# Patient Record
Sex: FEMALE | Race: BLACK OR AFRICAN AMERICAN | Employment: OTHER | URBAN - METROPOLITAN AREA
[De-identification: names, ages, dates, MRNs, and addresses within clinical notes are randomized per-mention and may not be internally consistent; named-entity substitution may affect disease eponyms.]

---

## 2018-03-23 ENCOUNTER — APPOINTMENT (OUTPATIENT)
Dept: GENERAL RADIOLOGY | Age: 83
End: 2018-03-23
Attending: EMERGENCY MEDICINE
Payer: MEDICARE

## 2018-03-23 ENCOUNTER — HOSPITAL ENCOUNTER (EMERGENCY)
Age: 83
Discharge: HOME | End: 2018-03-23
Attending: EMERGENCY MEDICINE
Payer: MEDICARE

## 2018-03-23 VITALS
SYSTOLIC BLOOD PRESSURE: 162 MMHG | DIASTOLIC BLOOD PRESSURE: 60 MMHG | HEART RATE: 72 BPM | TEMPERATURE: 97.6 F | WEIGHT: 113 LBS | BODY MASS INDEX: 21.34 KG/M2 | RESPIRATION RATE: 20 BRPM | HEIGHT: 61 IN | OXYGEN SATURATION: 100 %

## 2018-03-23 DIAGNOSIS — R00.2 PALPITATIONS: ICD-10-CM

## 2018-03-23 DIAGNOSIS — E87.6 ACUTE HYPOKALEMIA: ICD-10-CM

## 2018-03-23 DIAGNOSIS — I10 ACCELERATED HYPERTENSION: ICD-10-CM

## 2018-03-23 DIAGNOSIS — R06.02 SOB (SHORTNESS OF BREATH): ICD-10-CM

## 2018-03-23 DIAGNOSIS — J20.9 ACUTE BRONCHITIS, UNSPECIFIED ORGANISM: Primary | ICD-10-CM

## 2018-03-23 LAB
ALBUMIN SERPL-MCNC: 3.6 G/DL (ref 3.5–5)
ALBUMIN/GLOB SERPL: 1 {RATIO} (ref 1.1–2.2)
ALP SERPL-CCNC: 119 U/L (ref 45–117)
ALT SERPL-CCNC: 12 U/L (ref 12–78)
ANION GAP SERPL CALC-SCNC: 8 MMOL/L (ref 5–15)
APTT PPP: 28 SEC (ref 22.1–32)
AST SERPL-CCNC: 15 U/L (ref 15–37)
BASOPHILS # BLD: 0 K/UL (ref 0–0.1)
BASOPHILS NFR BLD: 0 % (ref 0–1)
BILIRUB SERPL-MCNC: 0.5 MG/DL (ref 0.2–1)
BNP SERPL-MCNC: 225 PG/ML (ref 0–450)
BUN SERPL-MCNC: 12 MG/DL (ref 6–20)
BUN/CREAT SERPL: 10 (ref 12–20)
CALCIUM SERPL-MCNC: 8.4 MG/DL (ref 8.5–10.1)
CHLORIDE SERPL-SCNC: 111 MMOL/L (ref 97–108)
CK MB CFR SERPL CALC: 2.4 % (ref 0–2.5)
CK MB SERPL-MCNC: 2.2 NG/ML (ref 5–25)
CK SERPL-CCNC: 92 U/L (ref 26–192)
CO2 SERPL-SCNC: 25 MMOL/L (ref 21–32)
CREAT SERPL-MCNC: 1.19 MG/DL (ref 0.55–1.02)
DIFFERENTIAL METHOD BLD: ABNORMAL
EOSINOPHIL # BLD: 0.4 K/UL (ref 0–0.4)
EOSINOPHIL NFR BLD: 5 % (ref 0–7)
ERYTHROCYTE [DISTWIDTH] IN BLOOD BY AUTOMATED COUNT: 17.7 % (ref 11.5–14.5)
FLUAV AG NPH QL IA: NEGATIVE
FLUBV AG NOSE QL IA: NEGATIVE
GLOBULIN SER CALC-MCNC: 3.6 G/DL (ref 2–4)
GLUCOSE SERPL-MCNC: 99 MG/DL (ref 65–100)
HCT VFR BLD AUTO: 42 % (ref 35–47)
HGB BLD-MCNC: 13.2 G/DL (ref 11.5–16)
IMM GRANULOCYTES # BLD: 0 K/UL (ref 0–0.04)
IMM GRANULOCYTES NFR BLD AUTO: 0 % (ref 0–0.5)
INR PPP: 1.1 (ref 0.9–1.1)
LYMPHOCYTES # BLD: 1.8 K/UL (ref 0.8–3.5)
LYMPHOCYTES NFR BLD: 24 % (ref 12–49)
MAGNESIUM SERPL-MCNC: 2 MG/DL (ref 1.6–2.4)
MCH RBC QN AUTO: 22.1 PG (ref 26–34)
MCHC RBC AUTO-ENTMCNC: 31.4 G/DL (ref 30–36.5)
MCV RBC AUTO: 70.4 FL (ref 80–99)
MONOCYTES # BLD: 0.4 K/UL (ref 0–1)
MONOCYTES NFR BLD: 5 % (ref 5–13)
NEUTS SEG # BLD: 5.1 K/UL (ref 1.8–8)
NEUTS SEG NFR BLD: 66 % (ref 32–75)
NRBC # BLD: 0 K/UL (ref 0–0.01)
NRBC BLD-RTO: 0 PER 100 WBC
PLATELET # BLD AUTO: 192 K/UL (ref 150–400)
PLATELET COMMENTS,PCOM: ABNORMAL
PMV BLD AUTO: ABNORMAL FL (ref 8.9–12.9)
POTASSIUM SERPL-SCNC: 3.1 MMOL/L (ref 3.5–5.1)
PROT SERPL-MCNC: 7.2 G/DL (ref 6.4–8.2)
PROTHROMBIN TIME: 10.6 SEC (ref 9–11.1)
RBC # BLD AUTO: 5.97 M/UL (ref 3.8–5.2)
RBC MORPH BLD: ABNORMAL
SODIUM SERPL-SCNC: 144 MMOL/L (ref 136–145)
THERAPEUTIC RANGE,PTTT: NORMAL SECS (ref 58–77)
TROPONIN I SERPL-MCNC: 0.04 NG/ML
WBC # BLD AUTO: 7.7 K/UL (ref 3.6–11)

## 2018-03-23 PROCEDURE — 74011250637 HC RX REV CODE- 250/637: Performed by: EMERGENCY MEDICINE

## 2018-03-23 PROCEDURE — 74011636637 HC RX REV CODE- 636/637: Performed by: EMERGENCY MEDICINE

## 2018-03-23 PROCEDURE — 83735 ASSAY OF MAGNESIUM: CPT | Performed by: EMERGENCY MEDICINE

## 2018-03-23 PROCEDURE — 85025 COMPLETE CBC W/AUTO DIFF WBC: CPT | Performed by: EMERGENCY MEDICINE

## 2018-03-23 PROCEDURE — 84484 ASSAY OF TROPONIN QUANT: CPT | Performed by: EMERGENCY MEDICINE

## 2018-03-23 PROCEDURE — 85730 THROMBOPLASTIN TIME PARTIAL: CPT | Performed by: EMERGENCY MEDICINE

## 2018-03-23 PROCEDURE — 94640 AIRWAY INHALATION TREATMENT: CPT

## 2018-03-23 PROCEDURE — 87804 INFLUENZA ASSAY W/OPTIC: CPT | Performed by: EMERGENCY MEDICINE

## 2018-03-23 PROCEDURE — 93005 ELECTROCARDIOGRAM TRACING: CPT

## 2018-03-23 PROCEDURE — 77030029684 HC NEB SM VOL KT MONA -A

## 2018-03-23 PROCEDURE — 85610 PROTHROMBIN TIME: CPT | Performed by: EMERGENCY MEDICINE

## 2018-03-23 PROCEDURE — 71045 X-RAY EXAM CHEST 1 VIEW: CPT

## 2018-03-23 PROCEDURE — 80053 COMPREHEN METABOLIC PANEL: CPT | Performed by: EMERGENCY MEDICINE

## 2018-03-23 PROCEDURE — 74011000250 HC RX REV CODE- 250: Performed by: EMERGENCY MEDICINE

## 2018-03-23 PROCEDURE — A9270 NON-COVERED ITEM OR SERVICE: HCPCS | Performed by: EMERGENCY MEDICINE

## 2018-03-23 PROCEDURE — 82550 ASSAY OF CK (CPK): CPT | Performed by: EMERGENCY MEDICINE

## 2018-03-23 PROCEDURE — 99285 EMERGENCY DEPT VISIT HI MDM: CPT

## 2018-03-23 PROCEDURE — 83880 ASSAY OF NATRIURETIC PEPTIDE: CPT | Performed by: EMERGENCY MEDICINE

## 2018-03-23 PROCEDURE — 36415 COLL VENOUS BLD VENIPUNCTURE: CPT | Performed by: EMERGENCY MEDICINE

## 2018-03-23 RX ORDER — ALBUTEROL SULFATE 90 UG/1
2 AEROSOL, METERED RESPIRATORY (INHALATION)
Qty: 1 INHALER | Refills: 0 | Status: SHIPPED | OUTPATIENT
Start: 2018-03-23 | End: 2020-04-09 | Stop reason: SDUPTHER

## 2018-03-23 RX ORDER — IPRATROPIUM BROMIDE AND ALBUTEROL SULFATE 2.5; .5 MG/3ML; MG/3ML
3 SOLUTION RESPIRATORY (INHALATION)
Status: COMPLETED | OUTPATIENT
Start: 2018-03-23 | End: 2018-03-23

## 2018-03-23 RX ORDER — GUAIFENESIN, PSEUDOEPHEDRINE HYDROCHLORIDE 600; 60 MG/1; MG/1
1 TABLET, EXTENDED RELEASE ORAL EVERY 12 HOURS
Qty: 20 TAB | Refills: 0 | Status: SHIPPED | OUTPATIENT
Start: 2018-03-23 | End: 2020-04-03

## 2018-03-23 RX ORDER — METHYLPREDNISOLONE 4 MG/1
TABLET ORAL
Qty: 1 DOSE PACK | Refills: 0 | Status: SHIPPED | OUTPATIENT
Start: 2018-03-23 | End: 2020-04-03

## 2018-03-23 RX ORDER — PREDNISONE 20 MG/1
60 TABLET ORAL ONCE
Status: COMPLETED | OUTPATIENT
Start: 2018-03-23 | End: 2018-03-23

## 2018-03-23 RX ORDER — POTASSIUM CHLORIDE 20 MEQ/1
40 TABLET, EXTENDED RELEASE ORAL
Status: COMPLETED | OUTPATIENT
Start: 2018-03-23 | End: 2018-03-23

## 2018-03-23 RX ADMIN — POTASSIUM CHLORIDE 40 MEQ: 20 TABLET, EXTENDED RELEASE ORAL at 14:13

## 2018-03-23 RX ADMIN — PREDNISONE 60 MG: 20 TABLET ORAL at 14:34

## 2018-03-23 RX ADMIN — IPRATROPIUM BROMIDE AND ALBUTEROL SULFATE 3 ML: .5; 3 SOLUTION RESPIRATORY (INHALATION) at 13:50

## 2018-03-23 NOTE — ED PROVIDER NOTES
EMERGENCY DEPARTMENT HISTORY AND PHYSICAL EXAM      Date: 3/23/2018  Patient Name: Alber Sanchez    History of Presenting Illness     Chief Complaint   Patient presents with    Shortness of Breath    Palpitations       History Provided By: Patient and EMS    HPI: Alber Snachez, 80 y.o. female with PMHx significant for HTN, MI, CAD, presents via EMS to the ED with cc of severe, sudden onset SOB that is exacerbated by ambulating x today 1hr PTA in the ED. Per EMS, pt had BL expiratory wheezes on scene with her O2 stat at 90; she was administered 2 duo neb treatments, and Deltasone in route to the ED. the pt reports her breathing has greatly improved since being picked up by EMS. per chart review, pt was diagnosed with a small stroke on her last admission. Of note, she follows Dr. Nelson Cortez, Cardiology and has taken her HTN medication this morning. She denies any other cc. Social Hx:  ETOH: +  Tobacco: +  Illicit drug use: n/a    PCP: Jl Monique MD   Cardiology: Dr. Nelson Cortez MD    There are no other complaints, changes, or physical findings at this time. Current Outpatient Prescriptions   Medication Sig Dispense Refill    methylPREDNISolone (MEDROL, ELIEL,) 4 mg tablet Take as instructed 1 Dose Pack 0    albuterol (PROVENTIL HFA, VENTOLIN HFA, PROAIR HFA) 90 mcg/actuation inhaler Take 2 Puffs by inhalation every four (4) hours as needed for Wheezing. 1 Inhaler 0    PSEUDOEPHEDRINE-guaiFENesin (MUCINEX D)  mg per tablet Take 1 Tab by mouth every twelve (12) hours. 20 Tab 0    HYDROcodone-acetaminophen (NORCO) 5-325 mg per tablet Take 1 Tab by mouth every four (4) hours as needed for Pain. Max Daily Amount: 6 Tabs. 20 Tab 0    predniSONE (DELTASONE) 20 mg tablet Take 1 tab po daily for 3 days, then 1/2 tab po daily for 3 days 5 tablet 0    traMADol (ULTRAM) 50 mg tablet Take 50 mg by mouth as needed.  aspirin 81 mg tablet Take 81 mg by mouth daily.            Past History Past Medical History:  Past Medical History:   Diagnosis Date    Hypertension     Other ill-defined conditions(629.89)     2 MI's last one in 2007       Past Surgical History:  History reviewed. No pertinent surgical history. Family History:  History reviewed. No pertinent family history. Social History:  Social History   Substance Use Topics    Smoking status: Current Every Day Smoker    Smokeless tobacco: Never Used    Alcohol use Yes       Allergies:  No Known Allergies      Review of Systems   Review of Systems   Constitutional: Negative. Negative for chills and fever. HENT: Negative. Negative for congestion, facial swelling, rhinorrhea, sore throat, trouble swallowing and voice change. Eyes: Negative. Respiratory: Positive for shortness of breath. Negative for apnea, cough, chest tightness and wheezing. Cardiovascular: Negative. Negative for chest pain, palpitations and leg swelling. Gastrointestinal: Negative. Negative for abdominal distention, abdominal pain, blood in stool, constipation, diarrhea, nausea and vomiting. Endocrine: Negative. Negative for cold intolerance, heat intolerance and polyuria. Genitourinary: Negative. Negative for difficulty urinating, dysuria, flank pain, frequency, hematuria and urgency. Musculoskeletal: Negative. Negative for arthralgias, back pain, myalgias, neck pain and neck stiffness. Skin: Negative. Negative for color change and rash. Neurological: Negative. Negative for dizziness, syncope, facial asymmetry, speech difficulty, weakness, light-headedness, numbness and headaches. Hematological: Negative. Does not bruise/bleed easily. Psychiatric/Behavioral: Negative. Negative for confusion and self-injury. The patient is not nervous/anxious. Physical Exam   Physical Exam   Constitutional: She is oriented to person, place, and time. She appears well-developed and well-nourished. No distress.    HENT:   Head: Normocephalic and atraumatic. Mouth/Throat: Oropharynx is clear and moist. No oropharyngeal exudate. Eyes: Conjunctivae and EOM are normal. Pupils are equal, round, and reactive to light. Neck: Normal range of motion. Cardiovascular: Normal rate, regular rhythm and normal heart sounds. Exam reveals no gallop and no friction rub. No murmur heard. Pulmonary/Chest: Effort normal. Tachypnea (in the 20's) noted. No respiratory distress. She has wheezes (BL expiratory). She has no rales. She exhibits no tenderness. Poor air exchange. Abdominal: Soft. Bowel sounds are normal. She exhibits no distension and no mass. There is no tenderness. There is no rebound and no guarding. Musculoskeletal: Normal range of motion. She exhibits no edema, tenderness or deformity. No peripheral edema. Neurological: She is alert and oriented to person, place, and time. She displays normal reflexes. No cranial nerve deficit. She exhibits normal muscle tone. Coordination normal.   Skin: Skin is warm. No rash noted. She is not diaphoretic. Psychiatric: She has a normal mood and affect. Nursing note and vitals reviewed.         Diagnostic Study Results     Labs -     Recent Results (from the past 12 hour(s))   EKG, 12 LEAD, INITIAL    Collection Time: 03/23/18  1:15 PM   Result Value Ref Range    Ventricular Rate 82 BPM    Atrial Rate 82 BPM    P-R Interval 144 ms    QRS Duration 90 ms    Q-T Interval 388 ms    QTC Calculation (Bezet) 453 ms    Calculated P Axis 16 degrees    Calculated R Axis 38 degrees    Calculated T Axis 64 degrees    Diagnosis       Normal sinus rhythm  Minimal voltage criteria for LVH, may be normal variant  Nonspecific T wave abnormality  Abnormal ECG  When compared with ECG of 27-MAR-2015 20:42,  Nonspecific T wave abnormality has replaced inverted T waves in Lateral leads     CBC WITH AUTOMATED DIFF    Collection Time: 03/23/18  1:20 PM   Result Value Ref Range    WBC 7.7 3.6 - 11.0 K/uL    RBC 5.97 (H) 3.80 - 5.20 M/uL    HGB 13.2 11.5 - 16.0 g/dL    HCT 42.0 35.0 - 47.0 %    MCV 70.4 (L) 80.0 - 99.0 FL    MCH 22.1 (L) 26.0 - 34.0 PG    MCHC 31.4 30.0 - 36.5 g/dL    RDW 17.7 (H) 11.5 - 14.5 %    PLATELET 286 393 - 892 K/uL    MPV ABNORMAL 8.9 - 12.9 FL    NRBC 0.0 0  WBC    ABSOLUTE NRBC 0.00 0.00 - 0.01 K/uL    NEUTROPHILS 66 32 - 75 %    LYMPHOCYTES 24 12 - 49 %    MONOCYTES 5 5 - 13 %    EOSINOPHILS 5 0 - 7 %    BASOPHILS 0 0 - 1 %    IMMATURE GRANULOCYTES 0 0.0 - 0.5 %    ABS. NEUTROPHILS 5.1 1.8 - 8.0 K/UL    ABS. LYMPHOCYTES 1.8 0.8 - 3.5 K/UL    ABS. MONOCYTES 0.4 0.0 - 1.0 K/UL    ABS. EOSINOPHILS 0.4 0.0 - 0.4 K/UL    ABS. BASOPHILS 0.0 0.0 - 0.1 K/UL    ABS. IMM. GRANS. 0.0 0.00 - 0.04 K/UL    DF SMEAR SCANNED      PLATELET COMMENTS Large Platelets      RBC COMMENTS HYPOCHROMIA  1+        RBC COMMENTS SCHISTOCYTES  PRESENT        RBC COMMENTS TARGET CELLS  PRESENT       CK W/ CKMB & INDEX    Collection Time: 03/23/18  1:20 PM   Result Value Ref Range    CK 92 26 - 192 U/L    CK - MB 2.2 <3.6 NG/ML    CK-MB Index 2.4 0 - 2.5     METABOLIC PANEL, COMPREHENSIVE    Collection Time: 03/23/18  1:20 PM   Result Value Ref Range    Sodium 144 136 - 145 mmol/L    Potassium 3.1 (L) 3.5 - 5.1 mmol/L    Chloride 111 (H) 97 - 108 mmol/L    CO2 25 21 - 32 mmol/L    Anion gap 8 5 - 15 mmol/L    Glucose 99 65 - 100 mg/dL    BUN 12 6 - 20 MG/DL    Creatinine 1.19 (H) 0.55 - 1.02 MG/DL    BUN/Creatinine ratio 10 (L) 12 - 20      GFR est AA 51 (L) >60 ml/min/1.73m2    GFR est non-AA 42 (L) >60 ml/min/1.73m2    Calcium 8.4 (L) 8.5 - 10.1 MG/DL    Bilirubin, total 0.5 0.2 - 1.0 MG/DL    ALT (SGPT) 12 12 - 78 U/L    AST (SGOT) 15 15 - 37 U/L    Alk.  phosphatase 119 (H) 45 - 117 U/L    Protein, total 7.2 6.4 - 8.2 g/dL    Albumin 3.6 3.5 - 5.0 g/dL    Globulin 3.6 2.0 - 4.0 g/dL    A-G Ratio 1.0 (L) 1.1 - 2.2     MAGNESIUM    Collection Time: 03/23/18  1:20 PM   Result Value Ref Range    Magnesium 2.0 1.6 - 2.4 mg/dL   NT-PRO BNP    Collection Time: 03/23/18  1:20 PM   Result Value Ref Range    NT pro- 0 - 450 PG/ML   PROTHROMBIN TIME + INR    Collection Time: 03/23/18  1:20 PM   Result Value Ref Range    INR 1.1 0.9 - 1.1      Prothrombin time 10.6 9.0 - 11.1 sec   PTT    Collection Time: 03/23/18  1:20 PM   Result Value Ref Range    aPTT 28.0 22.1 - 32.0 sec    aPTT, therapeutic range     58.0 - 77.0 SECS   TROPONIN I    Collection Time: 03/23/18  1:20 PM   Result Value Ref Range    Troponin-I, Qt. 0.04 <0.05 ng/mL   INFLUENZA A & B AG (RAPID TEST)    Collection Time: 03/23/18  1:20 PM   Result Value Ref Range    Influenza A Antigen NEGATIVE  NEG      Influenza B Antigen NEGATIVE  NEG         Radiologic Studies -   CXR Results  (Last 48 hours)               03/23/18 1337  XR CHEST PORT Final result    Impression:  IMPRESSION: No acute abnormality. Narrative:  EXAM:  XR CHEST PORT. INDICATION: Short of breath. COMPARISON: 3/27/2015. FINDINGS:    A portable AP radiograph of the chest was obtained at 1335 hours. The   positioning is lordotic. Lines and tubes: The patient is on a cardiac monitor. Lungs: The lungs are clear. Pleura: There is no pneumothorax or pleural effusion. Mediastinum: The cardiac and mediastinal contours and pulmonary vascularity are   normal. The aorta is atherosclerotic. Bones and soft tissues: The bones and soft tissues are grossly within normal   limits. Medical Decision Making   I am the first provider for this patient. I reviewed the vital signs, available nursing notes, past medical history, past surgical history, family history and social history. Vital Signs-Reviewed the patient's vital signs.   Patient Vitals for the past 12 hrs:   Temp Pulse Resp BP SpO2   03/23/18 1515 - 72 20 162/60 -   03/23/18 1500 - 73 23 157/61 100 %   03/23/18 1445 - 72 24 168/56 100 %   03/23/18 1434 - 83 22 166/57 -   03/23/18 1415 - 95 27 100/83 95 %   03/23/18 1400 - 74 19 137/50 99 %   03/23/18 1345 - 75 22 160/57 99 %   03/23/18 1330 - 81 21 148/63 100 %   03/23/18 1315 - 83 22 170/61 99 %   03/23/18 1256 97.6 °F (36.4 °C) 92 30 (!) 173/122 100 %     EKG interpretation: (Preliminary) 13:15  Rhythm: normal sinus rhythm; and regular . Rate (approx.): 82 bpm; Axis: normal; MD interval: normal; QRS interval: normal ; ST/T wave: non-specific T wave abnormality; Other findings: abnormal ekg. Records Reviewed: Nursing Notes, Old Medical Records, Previous electrocardiograms, Ambulance Run Sheet, Previous Radiology Studies and Previous Laboratory Studies    Provider Notes (Medical Decision Making):     DDx: CHF exacerbation, wheezing, pneumonia, ACS, pleural effusion. MDM: Pt reports improvement of symptoms after decadron and duo neb treatment in route. Per chart review, pt does have prior to MIs, and Hx of acute bronchitis. She is followed by Dr. Jeferson Gabriel. Will obtain EKG, basic labs, cardiac enzymes, chest x ray. Will provide addtional neb treatment. Pt does not appear to be fluid overloaded on exam.     ED Course:   Initial assessment performed. The patients presenting problems have been discussed, and they are in agreement with the care plan formulated and outlined with them. I have encouraged them to ask questions as they arise throughout their visit. 1:53 PM  I re-examined the patient and evaluated the effectiveness of breathing treatment they received. I feel that there has been some improvement, but also feel that the patient would benefit clinically from further breathing treatments. I will evaluate the patient again after the next round of treatments. Progress Note:  Pt states she feels much better; pain resolved; denies any nausea; no new symptoms; pt able to tolerate PO and ambulate without issues; pt clinically safe for discharge home with close PCP f/u.   At time of discharge, pt had stable vitals and had no questions or concerns, and was very satisfied with overall care. TOBACCO COUNSELING:  Upon evaluation, pt expressed that they are a current tobacco user. For approximately 10 minutes, pt has been counseled on the dangers of smoking and was encouraged to quit as soon as possible in order to decrease further risks to their health. Pt has conveyed their understanding of the risks involved should they continue to use tobacco products. Disposition:  DISCHARGE NOTE  3:36 PM  The patient has been re-evaluated and is ready for discharge. Reviewed available results with patient. Counseled pt on diagnosis and care plan. Pt has expressed understanding, and all questions have been answered. Pt agrees with plan and agrees to F/U as recommended, or return to the ED if their sxs worsen. Discharge instructions have been provided and explained to the pt, along with reasons to return to the ED. PLAN:  1. Discharge Medication List as of 3/23/2018  3:04 PM      CONTINUE these medications which have NOT CHANGED    Details   HYDROcodone-acetaminophen (NORCO) 5-325 mg per tablet Take 1 Tab by mouth every four (4) hours as needed for Pain. Max Daily Amount: 6 Tabs., Print, Disp-20 Tab, R-0      predniSONE (DELTASONE) 20 mg tablet Take 1 tab po daily for 3 days, then 1/2 tab po daily for 3 days, Print, Disp-5 tablet, R-0      traMADol (ULTRAM) 50 mg tablet Take 50 mg by mouth as needed., Historical Med      aspirin 81 mg tablet Take 81 mg by mouth daily. , Historical Med           2. Follow-up Information     Follow up With Details Comments [address], [place]   [address]  [phone]      [facility] EMERGENCY DEPT  As needed, If symptoms worsen [address] [place] [address]  [phone]        Return to ED if worse     Diagnosis     Clinical Impression:   1. Acute bronchitis, unspecified organism    2. SOB (shortness of breath)    3. Palpitations    4. Accelerated hypertension    5.  Acute hypokalemia        Attestations: This note is prepared by Francisco Landeros, acting as Scribe for Tomas Loving MD.      The scribe's documentation has been prepared under my direction and personally reviewed by me in its entirety. I confirm that the note above accurately reflects all work, treatment, procedures, and medical decision making performed by me. Tomas Loving MD        This note will not be viewable in [address]

## 2018-03-23 NOTE — DISCHARGE INSTRUCTIONS
Thank you! Thank you for allowing us to provide you with excellent care today. We hope we addressed all of your concerns and needs. We strive to provide excellent quality care in the Emergency Department. You will receive a survey after your visit to evaluate the care you were provided. Please rate us a level 5 (excellent), as anything less than excellent does not meet our goals. If you feel that you have not received excellent quality care or timely care, please ask to speak to the nurse manager. Please choose us in the future for your continued health care needs. ------------------------------------------------------------------------------------------------------------  The exam and treatment you received in the Emergency Department were for an urgent problem and are not intended as complete care. It is important that you follow up with a doctor, nurse practitioner, or physician assistant for ongoing care. If your symptoms become worse or you do not improve as expected and you are unable to reach your usual health care provider, you should return to the Emergency Department. We are available 24 hours a day. Please take your discharge instructions with you when you go to your follow-up appointment. If you have any problem arranging a follow-up appointment, contact the Emergency Department immediately. If a prescription has been provided, please have it filled as soon as possible to prevent a delay in treatment. Read the entire medication instruction sheet provided to you by the pharmacy. If you have any questions or reservations about taking the medication due to side effects or interactions with other medications, please call your primary care physician or contact the ER to speak with the charge nurse. Make an appointment with your family doctor or the physician you were referred to for follow-up of this visit as instructed on your discharge paperwork, as this is mandatory follow-up. Return to the ER if you are unable to be seen or if you are unable to be seen in a timely manner. If you have any problem arranging the follow-up visit, contact the Emergency Department immediately. Bronchitis: Care Instructions  Your Care Instructions    Bronchitis is inflammation of the bronchial tubes, which carry air to the lungs. The tubes swell and produce mucus, or phlegm. The mucus and inflamed bronchial tubes make you cough. You may have trouble breathing. Most cases of bronchitis are caused by viruses like those that cause colds. Antibiotics usually do not help and they may be harmful. Bronchitis usually develops rapidly and lasts about 2 to 3 weeks in otherwise healthy people. Follow-up care is a key part of your treatment and safety. Be sure to make and go to all appointments, and call your doctor if you are having problems. It's also a good idea to know your test results and keep a list of the medicines you take. How can you care for yourself at home? · Take all medicines exactly as prescribed. Call your doctor if you think you are having a problem with your medicine. · Get some extra rest.  · Take an over-the-counter pain medicine, such as acetaminophen (Tylenol), ibuprofen (Advil, Motrin), or naproxen (Aleve) to reduce fever and relieve body aches. Read and follow all instructions on the label. · Do not take two or more pain medicines at the same time unless the doctor told you to. Many pain medicines have acetaminophen, which is Tylenol. Too much acetaminophen (Tylenol) can be harmful. · Take an over-the-counter cough medicine that contains dextromethorphan to help quiet a dry, hacking cough so that you can sleep. Avoid cough medicines that have more than one active ingredient. Read and follow all instructions on the label. · Breathe moist air from a humidifier, hot shower, or sink filled with hot water. The heat and moisture will thin mucus so you can cough it out.   · Do not smoke. Smoking can make bronchitis worse. If you need help quitting, talk to your doctor about stop-smoking programs and medicines. These can increase your chances of quitting for good. When should you call for help? Call 911 anytime you think you may need emergency care. For example, call if:  ? · You have severe trouble breathing. ?Call your doctor now or seek immediate medical care if:  ? · You have new or worse trouble breathing. ? · You cough up dark brown or bloody mucus (sputum). ? · You have a new or higher fever. ? · You have a new rash. ? Watch closely for changes in your health, and be sure to contact your doctor if:  ? · You cough more deeply or more often, especially if you notice more mucus or a change in the color of your mucus. ? · You are not getting better as expected. Where can you learn more? Go to http://maria antonia-ellis.info/. Enter H333 in the search box to learn more about \"Bronchitis: Care Instructions. \"  Current as of: May 12, 2017  Content Version: 11.4  © 9365-5737 [employer]. Care instructions adapted under license by [employer] (which disclaims liability or warranty for this information). If you have questions about a medical condition or this instruction, always ask your healthcare professional. [address] any warranty or liability for your use of this information.

## 2018-03-23 NOTE — ED NOTES
Pt arrived via EMS and bedside report obtained. Assumed care of pt at this time. Patient arrived with c/o of SOB. Per ems, patient's O2 was at 88% on room air upon arrival. Patient has expiratory wheezing and labored breathing. Patient is also tachypneic and tachycardic at this time. Patient is currently at 98% on room air after receiving a duo-neb treatment provided by ems. Pt resting comfortably in bed with side rails up and call bell with within reach. Pt aware of plan to await for MD/PA-C assessment, and pt/family verbalizes understanding. Placed on Monitor x 3 with alarms on.

## 2018-03-23 NOTE — ED NOTES
Discharge instructions given to patient by Domenico Gonzalez MD. Patient verbalized understanding of discharge instructions. Pt discharged without difficulty. Pt discharged in stable condition via wheelchair, accompanied by family friend.

## 2018-03-24 LAB
ATRIAL RATE: 82 BPM
CALCULATED P AXIS, ECG09: 16 DEGREES
CALCULATED R AXIS, ECG10: 38 DEGREES
CALCULATED T AXIS, ECG11: 64 DEGREES
DIAGNOSIS, 93000: NORMAL
P-R INTERVAL, ECG05: 144 MS
Q-T INTERVAL, ECG07: 388 MS
QRS DURATION, ECG06: 90 MS
QTC CALCULATION (BEZET), ECG08: 453 MS
VENTRICULAR RATE, ECG03: 82 BPM

## 2018-08-26 ENCOUNTER — HOSPITAL ENCOUNTER (EMERGENCY)
Age: 83
Discharge: HOME | End: 2018-08-26
Attending: EMERGENCY MEDICINE
Payer: MEDICARE

## 2018-08-26 ENCOUNTER — APPOINTMENT (OUTPATIENT)
Dept: CT IMAGING | Age: 83
End: 2018-08-26
Attending: EMERGENCY MEDICINE
Payer: MEDICARE

## 2018-08-26 VITALS
HEIGHT: 60 IN | DIASTOLIC BLOOD PRESSURE: 46 MMHG | BODY MASS INDEX: 22.19 KG/M2 | HEART RATE: 57 BPM | WEIGHT: 113 LBS | RESPIRATION RATE: 18 BRPM | SYSTOLIC BLOOD PRESSURE: 142 MMHG | OXYGEN SATURATION: 96 %

## 2018-08-26 DIAGNOSIS — R55 NEAR SYNCOPE: ICD-10-CM

## 2018-08-26 DIAGNOSIS — E86.0 DEHYDRATION: ICD-10-CM

## 2018-08-26 DIAGNOSIS — N17.9 AKI (ACUTE KIDNEY INJURY) (HCC): Primary | ICD-10-CM

## 2018-08-26 LAB
ALBUMIN SERPL-MCNC: 3.3 G/DL (ref 3.5–5)
ALBUMIN/GLOB SERPL: 0.9 {RATIO} (ref 1.1–2.2)
ALP SERPL-CCNC: 91 U/L (ref 45–117)
ALT SERPL-CCNC: 14 U/L (ref 12–78)
ANION GAP SERPL CALC-SCNC: 7 MMOL/L (ref 5–15)
APPEARANCE UR: CLEAR
AST SERPL-CCNC: 12 U/L (ref 15–37)
BACTERIA URNS QL MICRO: ABNORMAL /HPF
BASOPHILS # BLD: 0.1 K/UL (ref 0–0.1)
BASOPHILS NFR BLD: 2 % (ref 0–1)
BILIRUB SERPL-MCNC: 0.5 MG/DL (ref 0.2–1)
BILIRUB UR QL: NEGATIVE
BUN SERPL-MCNC: 33 MG/DL (ref 6–20)
BUN/CREAT SERPL: 17 (ref 12–20)
CALCIUM SERPL-MCNC: 8.5 MG/DL (ref 8.5–10.1)
CHLORIDE SERPL-SCNC: 110 MMOL/L (ref 97–108)
CK SERPL-CCNC: 63 U/L (ref 26–192)
CO2 SERPL-SCNC: 26 MMOL/L (ref 21–32)
COLOR UR: ABNORMAL
CREAT SERPL-MCNC: 1.96 MG/DL (ref 0.55–1.02)
DIFFERENTIAL METHOD BLD: ABNORMAL
EOSINOPHIL # BLD: 0.4 K/UL (ref 0–0.4)
EOSINOPHIL NFR BLD: 10 % (ref 0–7)
EPITH CASTS URNS QL MICRO: ABNORMAL /LPF
ERYTHROCYTE [DISTWIDTH] IN BLOOD BY AUTOMATED COUNT: 17.8 % (ref 11.5–14.5)
GLOBULIN SER CALC-MCNC: 3.6 G/DL (ref 2–4)
GLUCOSE SERPL-MCNC: 110 MG/DL (ref 65–100)
GLUCOSE UR STRIP.AUTO-MCNC: NEGATIVE MG/DL
HCT VFR BLD AUTO: 39.7 % (ref 35–47)
HGB BLD-MCNC: 12.3 G/DL (ref 11.5–16)
HGB UR QL STRIP: ABNORMAL
IMM GRANULOCYTES # BLD: 0 K/UL (ref 0–0.04)
IMM GRANULOCYTES NFR BLD AUTO: 0 % (ref 0–0.5)
KETONES UR QL STRIP.AUTO: NEGATIVE MG/DL
LEUKOCYTE ESTERASE UR QL STRIP.AUTO: NEGATIVE
LYMPHOCYTES # BLD: 1.6 K/UL (ref 0.8–3.5)
LYMPHOCYTES NFR BLD: 38 % (ref 12–49)
MCH RBC QN AUTO: 22.5 PG (ref 26–34)
MCHC RBC AUTO-ENTMCNC: 31 G/DL (ref 30–36.5)
MCV RBC AUTO: 72.6 FL (ref 80–99)
MONOCYTES # BLD: 0.5 K/UL (ref 0–1)
MONOCYTES NFR BLD: 11 % (ref 5–13)
NEUTS SEG # BLD: 1.6 K/UL (ref 1.8–8)
NEUTS SEG NFR BLD: 39 % (ref 32–75)
NITRITE UR QL STRIP.AUTO: NEGATIVE
NRBC # BLD: 0 K/UL (ref 0–0.01)
NRBC BLD-RTO: 0 PER 100 WBC
PH UR STRIP: 6 [PH] (ref 5–8)
PLATELET # BLD AUTO: 167 K/UL (ref 150–400)
POTASSIUM SERPL-SCNC: 3.7 MMOL/L (ref 3.5–5.1)
PROT SERPL-MCNC: 6.9 G/DL (ref 6.4–8.2)
PROT UR STRIP-MCNC: ABNORMAL MG/DL
RBC # BLD AUTO: 5.47 M/UL (ref 3.8–5.2)
RBC #/AREA URNS HPF: ABNORMAL /HPF (ref 0–5)
SODIUM SERPL-SCNC: 143 MMOL/L (ref 136–145)
SP GR UR REFRACTOMETRY: 1.02 (ref 1–1.03)
T4 FREE SERPL-MCNC: 1 NG/DL (ref 0.8–1.5)
TROPONIN I SERPL-MCNC: <0.05 NG/ML
TSH SERPL DL<=0.05 MIU/L-ACNC: 10.8 UIU/ML (ref 0.36–3.74)
UR CULT HOLD, URHOLD: NORMAL
UROBILINOGEN UR QL STRIP.AUTO: 0.2 EU/DL (ref 0.2–1)
WBC # BLD AUTO: 4.1 K/UL (ref 3.6–11)
WBC URNS QL MICRO: ABNORMAL /HPF (ref 0–4)

## 2018-08-26 PROCEDURE — 84439 ASSAY OF FREE THYROXINE: CPT | Performed by: EMERGENCY MEDICINE

## 2018-08-26 PROCEDURE — 93005 ELECTROCARDIOGRAM TRACING: CPT

## 2018-08-26 PROCEDURE — 82550 ASSAY OF CK (CPK): CPT | Performed by: EMERGENCY MEDICINE

## 2018-08-26 PROCEDURE — 99285 EMERGENCY DEPT VISIT HI MDM: CPT

## 2018-08-26 PROCEDURE — 74011250636 HC RX REV CODE- 250/636: Performed by: EMERGENCY MEDICINE

## 2018-08-26 PROCEDURE — 80053 COMPREHEN METABOLIC PANEL: CPT | Performed by: EMERGENCY MEDICINE

## 2018-08-26 PROCEDURE — 85025 COMPLETE CBC W/AUTO DIFF WBC: CPT | Performed by: EMERGENCY MEDICINE

## 2018-08-26 PROCEDURE — 84484 ASSAY OF TROPONIN QUANT: CPT | Performed by: EMERGENCY MEDICINE

## 2018-08-26 PROCEDURE — 36415 COLL VENOUS BLD VENIPUNCTURE: CPT | Performed by: EMERGENCY MEDICINE

## 2018-08-26 PROCEDURE — 84443 ASSAY THYROID STIM HORMONE: CPT | Performed by: EMERGENCY MEDICINE

## 2018-08-26 PROCEDURE — 70450 CT HEAD/BRAIN W/O DYE: CPT

## 2018-08-26 PROCEDURE — 51701 INSERT BLADDER CATHETER: CPT

## 2018-08-26 PROCEDURE — 81001 URINALYSIS AUTO W/SCOPE: CPT | Performed by: EMERGENCY MEDICINE

## 2018-08-26 PROCEDURE — 96360 HYDRATION IV INFUSION INIT: CPT

## 2018-08-26 PROCEDURE — 77030011943

## 2018-08-26 RX ADMIN — SODIUM CHLORIDE 1000 ML: 900 INJECTION, SOLUTION INTRAVENOUS at 17:53

## 2018-08-26 NOTE — ED TRIAGE NOTES
[place] was [place] and had a syncopal episode with no fall. BP was 110/54 which the family states was low for her.  EMS gvave her 500 cc bolus

## 2018-08-26 NOTE — DISCHARGE INSTRUCTIONS
Dehydration: Care Instructions  Your Care Instructions  Dehydration happens when your body loses too much fluid. This might happen when you do not drink enough water or you lose large amounts of fluids from your body because of diarrhea, vomiting, or sweating. Severe dehydration can be life-threatening. Water and minerals called electrolytes help put your body fluids back in balance. Learn the early signs of fluid loss, and drink more fluids to prevent dehydration. Follow-up care is a key part of your treatment and safety. Be sure to make and go to all appointments, and call your doctor if you are having problems. It's also a good idea to know your test results and keep a list of the medicines you take. How can you care for yourself at home? · To prevent dehydration, drink plenty of fluids, enough so that your urine is light yellow or clear like water. Choose water and other caffeine-free clear liquids until you feel better. If you have kidney, heart, or liver disease and have to limit fluids, talk with your doctor before you increase the amount of fluids you drink. · If you do not feel like eating or drinking, try taking small sips of water, sports drinks, or other rehydration drinks. · Get plenty of rest.  To prevent dehydration  · Add more fluids to your diet and daily routine, unless your doctor has told you not to. · During hot weather, drink more fluids. Drink even more fluids if you exercise a lot. Stay away from drinks with alcohol or caffeine. · Watch for the symptoms of dehydration. These include:  ¨ A dry, sticky mouth. ¨ Dark yellow urine, and not much of it. ¨ Dry and sunken eyes. ¨ Feeling very tired. · Learn what problems can lead to dehydration. These include:  ¨ Diarrhea, fever, and vomiting. ¨ Any illness with a fever, such as pneumonia or the flu. ¨ Activities that cause heavy sweating, such as endurance races and heavy outdoor work in hot or humid weather.   ¨ Alcohol or drug abuse or withdrawal.  ¨ Certain medicines, such as cold and allergy pills (antihistamines), diet pills (diuretics), and laxatives. ¨ Certain diseases, such as diabetes, cancer, and heart or kidney disease. When should you call for help? Call 911 anytime you think you may need emergency care. For example, call if:    · You passed out (lost consciousness).    Call your doctor now or seek immediate medical care if:    · You are confused and cannot think clearly.     · You are dizzy or lightheaded, or you feel like you may faint.     · You have signs of needing more fluids. You have sunken eyes and a dry mouth, and you pass only a little dark urine.     · You cannot keep fluids down.    Watch closely for changes in your health, and be sure to contact your doctor if:    · You are not making tears.     · Your skin is very dry and sags slowly back into place after you pinch it.     · Your mouth and eyes are very dry. Where can you learn more? Go to http://maria antonia-ellis.info/. Enter D080 in the search box to learn more about \"Dehydration: Care Instructions. \"  Current as of: November 20, 2017  Content Version: 11.7  © 4112-4684 [employer]. Care instructions adapted under license by [employer] (which disclaims liability or warranty for this information). If you have questions about a medical condition or this instruction, always ask your healthcare professional. [place] [address] any warranty or liability for your use of this information. Lightheadedness or Faintness: Care Instructions  Your Care Instructions  Lightheadedness is a feeling that you are about to faint or \"pass out. \" You do not feel as if you or your surroundings are moving. It is different from vertigo, which is the feeling that you or things around you are spinning or tilting. Lightheadedness usually goes away or gets better when you lie down.  If lightheadedness gets worse, it can lead to a fainting spell. It is common to feel lightheaded from time to time. Lightheadedness usually is not caused by a serious problem. It often is caused by a short-lasting drop in blood pressure and blood flow to your head that occurs when you get up too quickly from a seated or lying position. Follow-up care is a key part of your treatment and safety. Be sure to make and go to all appointments, and call your doctor if you are having problems. It's also a good idea to know your test results and keep a list of the medicines you take. How can you care for yourself at home? · Lie down for 1 or 2 minutes when you feel lightheaded. After lying down, sit up slowly and remain sitting for 1 to 2 minutes before slowly standing up. · Avoid movements, positions, or activities that have made you lightheaded in the past.  · Get plenty of rest, especially if you have a cold or flu, which can cause lightheadedness. · Make sure you drink plenty of fluids, especially if you have a fever or have been sweating. · Do not drive or put yourself and others in danger while you feel lightheaded. When should you call for help? Call 911 anytime you think you may need emergency care. For example, call if:    · You have symptoms of a stroke. These may include:  ¨ Sudden numbness, tingling, weakness, or loss of movement in your face, arm, or leg, especially on only one side of your body. ¨ Sudden vision changes. ¨ Sudden trouble speaking. ¨ Sudden confusion or trouble understanding simple statements. ¨ Sudden problems with walking or balance. ¨ A sudden, severe headache that is different from past headaches.     · You have symptoms of a heart attack. These may include:  ¨ Chest pain or pressure, or a strange feeling in the chest.  ¨ Sweating. ¨ Shortness of breath. ¨ Nausea or vomiting. ¨ Pain, pressure, or a strange feeling in the back, neck, jaw, or upper belly or in one or both shoulders or arms.   ¨ Lightheadedness or sudden weakness. ¨ A fast or irregular heartbeat. After you call 911, the  may tell you to chew 1 adult-strength or 2 to 4 low-dose aspirin. Wait for an ambulance. Do not try to drive yourself.    Watch closely for changes in your health, and be sure to contact your doctor if:    · Your lightheadedness gets worse or does not get better with home care. Where can you learn more? Go to http://maria antonia-ellis.info/. Enter E969 in the search box to learn more about \"Lightheadedness or Faintness: Care Instructions. \"  Current as of: November 20, 2017  Content Version: 11.7  © 7114-7780 [employer]. Care instructions adapted under license by [employer] (which disclaims liability or warranty for this information). If you have questions about a medical condition or this instruction, always ask your healthcare professional. [address] any warranty or liability for your use of this information.

## 2018-08-27 LAB
ATRIAL RATE: 55 BPM
CALCULATED P AXIS, ECG09: -18 DEGREES
CALCULATED R AXIS, ECG10: 47 DEGREES
CALCULATED T AXIS, ECG11: 65 DEGREES
DIAGNOSIS, 93000: NORMAL
P-R INTERVAL, ECG05: 164 MS
Q-T INTERVAL, ECG07: 476 MS
QRS DURATION, ECG06: 92 MS
QTC CALCULATION (BEZET), ECG08: 455 MS
VENTRICULAR RATE, ECG03: 55 BPM

## 2018-08-27 NOTE — ED PROVIDER NOTES
Patient is a 80 y.o. female presenting with syncope. The history is provided by the patient and a relative. Syncope    This is a new problem. The current episode started less than 1 hour ago (near-syncope where she became weak and fully recovered). The problem occurs constantly. The problem has not changed since onset. She lost consciousness for a period of less than one minute. Associated with: being outside. Associated symptoms include confusion (chronic) and light-headedness. Pertinent negatives include no chest pain, no fever, no malaise/fatigue, no congestion, no headaches, no focal weakness, no seizures, no slurred speech and no head injury. Treatments tried: IV fluids. The treatment provided significant relief. Past Medical History:   Diagnosis Date    Hypertension     Other ill-defined conditions(413.22)     2 MI's last one in 2007       History reviewed. No pertinent surgical history. History reviewed. No pertinent family history. Social History     Social History    Marital status:      Spouse name: N/A    Number of children: N/A    Years of education: N/A     Occupational History    Not on file. Social History Main Topics    Smoking status: Current Every Day Smoker    Smokeless tobacco: Never Used    Alcohol use Yes    Drug use: Not on file    Sexual activity: Not on file     Other Topics Concern    Not on file     Social History Narrative         ALLERGIES: Review of patient's allergies indicates no known allergies. Review of Systems   Constitutional: Negative for fever and malaise/fatigue. HENT: Negative for congestion. Cardiovascular: Positive for syncope. Negative for chest pain. Neurological: Positive for light-headedness. Negative for focal weakness, seizures and headaches. Psychiatric/Behavioral: Positive for confusion (chronic). All other systems reviewed and are negative.       Vitals:    08/26/18 1815 08/26/18 1830 08/26/18 1845 08/26/18 1900 BP: 143/56 137/53 (!) 141/98 142/46   Pulse: (!) 58 63 60 (!) 57   Resp: 17 22 19 18   SpO2: 97% 98% 97% 96%   Weight:       Height:                Physical Exam   Constitutional: She appears well-developed and well-nourished. No distress. HENT:   Head: Normocephalic and atraumatic. Eyes: Conjunctivae are normal.   Neck: Neck supple. Cardiovascular: Normal rate, regular rhythm and normal heart sounds. Pulmonary/Chest: Effort normal and breath sounds normal. No stridor. No respiratory distress. Abdominal: She exhibits no distension. Musculoskeletal: Normal range of motion. Neurological: She is alert. No cranial nerve deficit. Coordination normal.   Skin: Skin is warm and dry. No pallor. Psychiatric: She has a normal mood and affect. Her behavior is normal.   Nursing note and vitals reviewed. MDM    80 y.o. female presents with near-syncopal episode where she had slumped over in the car when preparing to leave a festival. She had been outside. EKG reviewed by me is normal sinus rhythm and rate, without evidence of ST or T wave changes to suggest myocardial ischemia, no delta wave, no brugada, no prolonged QT to suggest arrhythmogenicity. CT head without abnormalities, lab work up overall unrevealing. Of note, patient's Cr is slightly higher than usual with baseline of 1.12-1.67 now almost at 2. She was given IVF and ruled out for rhabdo. She is well appearing and her family tells me she is acting normally. In an effort to avoid admission at this advanced age she agreed to have metabolic panel rechecked tomorrow either at her PCP office or here to ensure that her renal function isn't worse. Plan to follow up with PCP as needed and return precautions discussed for worsening or new concerning symptoms. ED Course       Procedures    EKG 1631: Rate 55, normal EKG, sinus bradycardia, unchanged from previous tracings.

## 2020-03-29 ENCOUNTER — APPOINTMENT (OUTPATIENT)
Dept: GENERAL RADIOLOGY | Age: 85
DRG: 064 | End: 2020-03-29
Attending: NURSE PRACTITIONER
Payer: MEDICARE

## 2020-03-29 ENCOUNTER — HOSPITAL ENCOUNTER (EMERGENCY)
Age: 85
Discharge: ANOTHER HOSPITAL | End: 2020-03-29
Attending: EMERGENCY MEDICINE
Payer: MEDICARE

## 2020-03-29 ENCOUNTER — APPOINTMENT (OUTPATIENT)
Dept: GENERAL RADIOLOGY | Age: 85
End: 2020-03-29
Attending: EMERGENCY MEDICINE
Payer: MEDICARE

## 2020-03-29 ENCOUNTER — APPOINTMENT (OUTPATIENT)
Dept: CT IMAGING | Age: 85
End: 2020-03-29
Attending: EMERGENCY MEDICINE
Payer: MEDICARE

## 2020-03-29 ENCOUNTER — HOSPITAL ENCOUNTER (INPATIENT)
Age: 85
LOS: 5 days | Discharge: HOME HEALTH | DRG: 064 | End: 2020-04-03
Attending: SURGERY | Admitting: SURGERY
Payer: MEDICARE

## 2020-03-29 VITALS
TEMPERATURE: 98.5 F | BODY MASS INDEX: 17.14 KG/M2 | DIASTOLIC BLOOD PRESSURE: 76 MMHG | WEIGHT: 87.74 LBS | RESPIRATION RATE: 22 BRPM | SYSTOLIC BLOOD PRESSURE: 174 MMHG | OXYGEN SATURATION: 98 % | HEART RATE: 63 BPM

## 2020-03-29 DIAGNOSIS — I63.9 CEREBROVASCULAR ACCIDENT (CVA), UNSPECIFIED MECHANISM (HCC): ICD-10-CM

## 2020-03-29 DIAGNOSIS — R13.10 DYSPHAGIA, UNSPECIFIED TYPE: ICD-10-CM

## 2020-03-29 DIAGNOSIS — R41.82 ALTERED MENTAL STATUS, UNSPECIFIED ALTERED MENTAL STATUS TYPE: ICD-10-CM

## 2020-03-29 DIAGNOSIS — S06.5XAA SUBDURAL HEMATOMA: ICD-10-CM

## 2020-03-29 DIAGNOSIS — G93.40 ACUTE ENCEPHALOPATHY: ICD-10-CM

## 2020-03-29 DIAGNOSIS — R53.81 PHYSICAL DEBILITY: ICD-10-CM

## 2020-03-29 DIAGNOSIS — G93.6 CEREBRAL EDEMA (HCC): ICD-10-CM

## 2020-03-29 DIAGNOSIS — F03.90 DEMENTIA WITHOUT BEHAVIORAL DISTURBANCE, UNSPECIFIED DEMENTIA TYPE: ICD-10-CM

## 2020-03-29 DIAGNOSIS — I62.9 INTRACRANIAL HEMORRHAGE (HCC): Primary | ICD-10-CM

## 2020-03-29 DIAGNOSIS — I16.1 HYPERTENSIVE EMERGENCY: ICD-10-CM

## 2020-03-29 DIAGNOSIS — R06.02 SHORTNESS OF BREATH: ICD-10-CM

## 2020-03-29 DIAGNOSIS — I61.6: ICD-10-CM

## 2020-03-29 PROBLEM — I61.9 ICH (INTRACEREBRAL HEMORRHAGE) (HCC): Status: ACTIVE | Noted: 2020-03-29

## 2020-03-29 LAB
ALBUMIN SERPL-MCNC: 3.3 G/DL (ref 3.5–5)
ALBUMIN/GLOB SERPL: 0.7 {RATIO} (ref 1.1–2.2)
ALP SERPL-CCNC: 156 U/L (ref 45–117)
ALT SERPL-CCNC: 10 U/L (ref 12–78)
ANION GAP SERPL CALC-SCNC: 8 MMOL/L (ref 5–15)
APPEARANCE UR: CLEAR
APTT PPP: 26.5 SEC (ref 22.1–32)
AST SERPL-CCNC: 15 U/L (ref 15–37)
BACTERIA URNS QL MICRO: NEGATIVE /HPF
BASOPHILS # BLD: 0.1 K/UL (ref 0–0.1)
BASOPHILS NFR BLD: 1 % (ref 0–1)
BILIRUB SERPL-MCNC: 0.9 MG/DL (ref 0.2–1)
BILIRUB UR QL: NEGATIVE
BUN SERPL-MCNC: 17 MG/DL (ref 6–20)
BUN/CREAT SERPL: 15 (ref 12–20)
CALCIUM SERPL-MCNC: 9.5 MG/DL (ref 8.5–10.1)
CHLORIDE SERPL-SCNC: 106 MMOL/L (ref 97–108)
CO2 SERPL-SCNC: 24 MMOL/L (ref 21–32)
COLOR UR: NORMAL
CREAT SERPL-MCNC: 1.13 MG/DL (ref 0.55–1.02)
DIFFERENTIAL METHOD BLD: ABNORMAL
EOSINOPHIL # BLD: 2.6 K/UL (ref 0–0.4)
EOSINOPHIL NFR BLD: 36 % (ref 0–7)
EPITH CASTS URNS QL MICRO: NORMAL /LPF
ERYTHROCYTE [DISTWIDTH] IN BLOOD BY AUTOMATED COUNT: 16.9 % (ref 11.5–14.5)
GLOBULIN SER CALC-MCNC: 4.5 G/DL (ref 2–4)
GLUCOSE SERPL-MCNC: 85 MG/DL (ref 65–100)
GLUCOSE UR STRIP.AUTO-MCNC: NEGATIVE MG/DL
HCT VFR BLD AUTO: 41.1 % (ref 35–47)
HGB BLD-MCNC: 13.1 G/DL (ref 11.5–16)
HGB UR QL STRIP: NEGATIVE
HYALINE CASTS URNS QL MICRO: NORMAL /LPF (ref 0–5)
IMM GRANULOCYTES # BLD AUTO: 0 K/UL (ref 0–0.04)
IMM GRANULOCYTES NFR BLD AUTO: 0 % (ref 0–0.5)
INR PPP: 1 (ref 0.9–1.1)
KETONES UR QL STRIP.AUTO: NEGATIVE MG/DL
LEUKOCYTE ESTERASE UR QL STRIP.AUTO: NEGATIVE
LYMPHOCYTES # BLD: 1.7 K/UL (ref 0.8–3.5)
LYMPHOCYTES NFR BLD: 24 % (ref 12–49)
MCH RBC QN AUTO: 22.4 PG (ref 26–34)
MCHC RBC AUTO-ENTMCNC: 31.9 G/DL (ref 30–36.5)
MCV RBC AUTO: 70.1 FL (ref 80–99)
MONOCYTES # BLD: 0.4 K/UL (ref 0–1)
MONOCYTES NFR BLD: 6 % (ref 5–13)
NEUTS SEG # BLD: 2.4 K/UL (ref 1.8–8)
NEUTS SEG NFR BLD: 33 % (ref 32–75)
NITRITE UR QL STRIP.AUTO: NEGATIVE
NRBC # BLD: 0 K/UL (ref 0–0.01)
NRBC BLD-RTO: 0 PER 100 WBC
PH UR STRIP: 7 [PH] (ref 5–8)
PLATELET # BLD AUTO: 159 K/UL (ref 150–400)
POTASSIUM SERPL-SCNC: 3.9 MMOL/L (ref 3.5–5.1)
PROT SERPL-MCNC: 7.8 G/DL (ref 6.4–8.2)
PROT UR STRIP-MCNC: NEGATIVE MG/DL
PROTHROMBIN TIME: 10.8 SEC (ref 9–11.1)
RBC # BLD AUTO: 5.86 M/UL (ref 3.8–5.2)
RBC #/AREA URNS HPF: NORMAL /HPF (ref 0–5)
RBC MORPH BLD: ABNORMAL
SODIUM SERPL-SCNC: 138 MMOL/L (ref 136–145)
SP GR UR REFRACTOMETRY: 1.01 (ref 1–1.03)
THERAPEUTIC RANGE,PTTT: NORMAL SECS (ref 58–77)
UA: UC IF INDICATED,UAUC: NORMAL
UROBILINOGEN UR QL STRIP.AUTO: 1 EU/DL (ref 0.2–1)
WBC # BLD AUTO: 7.2 K/UL (ref 3.6–11)
WBC URNS QL MICRO: NORMAL /HPF (ref 0–4)

## 2020-03-29 PROCEDURE — 71045 X-RAY EXAM CHEST 1 VIEW: CPT

## 2020-03-29 PROCEDURE — 85730 THROMBOPLASTIN TIME PARTIAL: CPT

## 2020-03-29 PROCEDURE — 74011000250 HC RX REV CODE- 250: Performed by: NURSE PRACTITIONER

## 2020-03-29 PROCEDURE — 74011250636 HC RX REV CODE- 250/636: Performed by: NURSE PRACTITIONER

## 2020-03-29 PROCEDURE — 74011000250 HC RX REV CODE- 250

## 2020-03-29 PROCEDURE — 74011000250 HC RX REV CODE- 250: Performed by: EMERGENCY MEDICINE

## 2020-03-29 PROCEDURE — 70450 CT HEAD/BRAIN W/O DYE: CPT

## 2020-03-29 PROCEDURE — 81001 URINALYSIS AUTO W/SCOPE: CPT

## 2020-03-29 PROCEDURE — 96365 THER/PROPH/DIAG IV INF INIT: CPT

## 2020-03-29 PROCEDURE — 36415 COLL VENOUS BLD VENIPUNCTURE: CPT

## 2020-03-29 PROCEDURE — 96375 TX/PRO/DX INJ NEW DRUG ADDON: CPT

## 2020-03-29 PROCEDURE — 74011250636 HC RX REV CODE- 250/636: Performed by: EMERGENCY MEDICINE

## 2020-03-29 PROCEDURE — 99285 EMERGENCY DEPT VISIT HI MDM: CPT

## 2020-03-29 PROCEDURE — 85025 COMPLETE CBC W/AUTO DIFF WBC: CPT

## 2020-03-29 PROCEDURE — 65610000006 HC RM INTENSIVE CARE

## 2020-03-29 PROCEDURE — 93005 ELECTROCARDIOGRAM TRACING: CPT

## 2020-03-29 PROCEDURE — 80053 COMPREHEN METABOLIC PANEL: CPT

## 2020-03-29 PROCEDURE — 85610 PROTHROMBIN TIME: CPT

## 2020-03-29 RX ORDER — SODIUM CHLORIDE 0.9 % (FLUSH) 0.9 %
5-40 SYRINGE (ML) INJECTION EVERY 8 HOURS
Status: DISCONTINUED | OUTPATIENT
Start: 2020-03-29 | End: 2020-04-03 | Stop reason: HOSPADM

## 2020-03-29 RX ORDER — LABETALOL HYDROCHLORIDE 5 MG/ML
INJECTION, SOLUTION INTRAVENOUS
Status: COMPLETED
Start: 2020-03-29 | End: 2020-03-29

## 2020-03-29 RX ORDER — LABETALOL HYDROCHLORIDE 5 MG/ML
20 INJECTION, SOLUTION INTRAVENOUS
Status: COMPLETED | OUTPATIENT
Start: 2020-03-29 | End: 2020-03-29

## 2020-03-29 RX ORDER — LABETALOL HYDROCHLORIDE 5 MG/ML
20 INJECTION, SOLUTION INTRAVENOUS
Status: DISCONTINUED | OUTPATIENT
Start: 2020-03-29 | End: 2020-04-01

## 2020-03-29 RX ORDER — ACETAMINOPHEN 500 MG
1000 TABLET ORAL
Status: DISCONTINUED | OUTPATIENT
Start: 2020-03-29 | End: 2020-03-29

## 2020-03-29 RX ORDER — SODIUM CHLORIDE, SODIUM LACTATE, POTASSIUM CHLORIDE, CALCIUM CHLORIDE 600; 310; 30; 20 MG/100ML; MG/100ML; MG/100ML; MG/100ML
75 INJECTION, SOLUTION INTRAVENOUS CONTINUOUS
Status: DISCONTINUED | OUTPATIENT
Start: 2020-03-29 | End: 2020-03-31

## 2020-03-29 RX ORDER — LABETALOL HYDROCHLORIDE 5 MG/ML
20 INJECTION, SOLUTION INTRAVENOUS
Status: DISCONTINUED | OUTPATIENT
Start: 2020-03-29 | End: 2020-03-29

## 2020-03-29 RX ORDER — ACETAMINOPHEN 325 MG/1
650 TABLET ORAL
Status: DISCONTINUED | OUTPATIENT
Start: 2020-03-29 | End: 2020-04-03 | Stop reason: HOSPADM

## 2020-03-29 RX ORDER — SODIUM CHLORIDE 0.9 % (FLUSH) 0.9 %
5-40 SYRINGE (ML) INJECTION AS NEEDED
Status: DISCONTINUED | OUTPATIENT
Start: 2020-03-29 | End: 2020-04-03 | Stop reason: HOSPADM

## 2020-03-29 RX ORDER — ALBUTEROL SULFATE 0.83 MG/ML
2.5 SOLUTION RESPIRATORY (INHALATION)
Status: DISCONTINUED | OUTPATIENT
Start: 2020-03-29 | End: 2020-04-03 | Stop reason: HOSPADM

## 2020-03-29 RX ORDER — ALBUTEROL SULFATE 90 UG/1
2 AEROSOL, METERED RESPIRATORY (INHALATION)
Status: DISCONTINUED | OUTPATIENT
Start: 2020-03-29 | End: 2020-03-29 | Stop reason: CLARIF

## 2020-03-29 RX ORDER — DOCUSATE SODIUM 100 MG/1
100 CAPSULE, LIQUID FILLED ORAL 2 TIMES DAILY
Status: DISCONTINUED | OUTPATIENT
Start: 2020-03-30 | End: 2020-04-02

## 2020-03-29 RX ORDER — ONDANSETRON 2 MG/ML
4 INJECTION INTRAMUSCULAR; INTRAVENOUS
Status: DISCONTINUED | OUTPATIENT
Start: 2020-03-29 | End: 2020-04-03 | Stop reason: HOSPADM

## 2020-03-29 RX ADMIN — SODIUM CHLORIDE 1000 ML: 900 INJECTION, SOLUTION INTRAVENOUS at 20:12

## 2020-03-29 RX ADMIN — DEXTROSE MONOHYDRATE 2.5 MG/HR: 50 INJECTION, SOLUTION INTRAVENOUS at 21:11

## 2020-03-29 RX ADMIN — LABETALOL HYDROCHLORIDE 20 MG: 5 INJECTION INTRAVENOUS at 22:30

## 2020-03-29 RX ADMIN — SODIUM CHLORIDE, SODIUM LACTATE, POTASSIUM CHLORIDE, AND CALCIUM CHLORIDE 75 ML/HR: 600; 310; 30; 20 INJECTION, SOLUTION INTRAVENOUS at 23:39

## 2020-03-29 RX ADMIN — Medication 10 ML: at 23:39

## 2020-03-29 RX ADMIN — SODIUM CHLORIDE 5 MG/HR: 900 INJECTION, SOLUTION INTRAVENOUS at 23:57

## 2020-03-29 RX ADMIN — LABETALOL HYDROCHLORIDE 20 MG: 5 INJECTION, SOLUTION INTRAVENOUS at 22:30

## 2020-03-29 RX ADMIN — LABETALOL HYDROCHLORIDE 20 MG: 5 INJECTION INTRAVENOUS at 20:31

## 2020-03-29 NOTE — ED NOTES
Bedside and Verbal shift change report given to Marie Lopez (oncoming nurse) by Anu Reyes (offgoing nurse). Report included the following information SBAR, Kardex, ED Summary, [facility] and Recent Results.

## 2020-03-29 NOTE — ED NOTES
1841: Patient presents to the ED via EMS complaining of fatigue. EMS reports the patient's son called EMS due to the patient's lack of energy. Patient reports she does not know why she is here. Patient denies calling EMS. Patient acknowledged a cough, abdominal pain and weakness when asked by nurse. Patient appears to be altered and answers yes to any question asked. Patient unable to full answer questions at this time. Patient's temperature was taken and recorded as 100. Patient also acknowledges a non-productive cough. Patient transferred to Room 9 for full work up.    
 
2125: Patient's son is at bedside. Patient's son provide a medical history to Primary Nurse.

## 2020-03-30 ENCOUNTER — APPOINTMENT (OUTPATIENT)
Dept: NON INVASIVE DIAGNOSTICS | Age: 85
DRG: 064 | End: 2020-03-30
Attending: NURSE PRACTITIONER
Payer: MEDICARE

## 2020-03-30 ENCOUNTER — APPOINTMENT (OUTPATIENT)
Dept: CT IMAGING | Age: 85
DRG: 064 | End: 2020-03-30
Attending: SPECIALIST
Payer: MEDICARE

## 2020-03-30 ENCOUNTER — APPOINTMENT (OUTPATIENT)
Dept: MRI IMAGING | Age: 85
DRG: 064 | End: 2020-03-30
Attending: PSYCHIATRY & NEUROLOGY
Payer: MEDICARE

## 2020-03-30 LAB
ALBUMIN SERPL-MCNC: 3.1 G/DL (ref 3.5–5)
ALBUMIN/GLOB SERPL: 0.7 {RATIO} (ref 1.1–2.2)
ALP SERPL-CCNC: 151 U/L (ref 45–117)
ALT SERPL-CCNC: 10 U/L (ref 12–78)
ANION GAP SERPL CALC-SCNC: 10 MMOL/L (ref 5–15)
ANION GAP SERPL CALC-SCNC: 10 MMOL/L (ref 5–15)
AST SERPL-CCNC: 17 U/L (ref 15–37)
ATRIAL RATE: 88 BPM
ATRIAL RATE: 89 BPM
AV VELOCITY RATIO: 0.76
BASOPHILS # BLD: 0 K/UL (ref 0–0.1)
BASOPHILS NFR BLD: 0 % (ref 0–1)
BILIRUB SERPL-MCNC: 0.7 MG/DL (ref 0.2–1)
BUN SERPL-MCNC: 16 MG/DL (ref 6–20)
BUN SERPL-MCNC: 16 MG/DL (ref 6–20)
BUN/CREAT SERPL: 15 (ref 12–20)
BUN/CREAT SERPL: 15 (ref 12–20)
CALCIUM SERPL-MCNC: 9 MG/DL (ref 8.5–10.1)
CALCIUM SERPL-MCNC: 9.3 MG/DL (ref 8.5–10.1)
CALCULATED P AXIS, ECG09: 76 DEGREES
CALCULATED P AXIS, ECG09: 84 DEGREES
CALCULATED R AXIS, ECG10: 22 DEGREES
CALCULATED R AXIS, ECG10: 29 DEGREES
CALCULATED T AXIS, ECG11: 69 DEGREES
CALCULATED T AXIS, ECG11: 78 DEGREES
CHLORIDE SERPL-SCNC: 107 MMOL/L (ref 97–108)
CHLORIDE SERPL-SCNC: 108 MMOL/L (ref 97–108)
CHOLEST SERPL-MCNC: 158 MG/DL
CO2 SERPL-SCNC: 21 MMOL/L (ref 21–32)
CO2 SERPL-SCNC: 21 MMOL/L (ref 21–32)
CREAT SERPL-MCNC: 1.07 MG/DL (ref 0.55–1.02)
CREAT SERPL-MCNC: 1.1 MG/DL (ref 0.55–1.02)
CRP SERPL HS-MCNC: 1 MG/L
DIAGNOSIS, 93000: NORMAL
DIAGNOSIS, 93000: NORMAL
DIFFERENTIAL METHOD BLD: ABNORMAL
ECHO AO ROOT DIAM: 3.03 CM
ECHO AV AREA PEAK VELOCITY: 2.5 CM2
ECHO AV PEAK GRADIENT: 10.6 MMHG
ECHO AV PEAK VELOCITY: 162.71 CM/S
ECHO EST RA PRESSURE: 5 MMHG
ECHO LA AREA 4C: 15.4 CM2
ECHO LA MAJOR AXIS: 3.36 CM
ECHO LA TO AORTIC ROOT RATIO: 1.11
ECHO LA VOL 2C: 29.7 ML (ref 22–52)
ECHO LA VOL 4C: 42.98 ML (ref 22–52)
ECHO LA VOL BP: 37.89 ML (ref 22–52)
ECHO LV E' LATERAL VELOCITY: 3.64 CM/S
ECHO LV E' SEPTAL VELOCITY: 3.53 CM/S
ECHO LV INTERNAL DIMENSION DIASTOLIC: 3.45 CM (ref 3.9–5.3)
ECHO LV INTERNAL DIMENSION SYSTOLIC: 2.19 CM
ECHO LV IVSD: 1.06 CM (ref 0.6–0.9)
ECHO LV MASS 2D: 113.1 G (ref 67–162)
ECHO LV POSTERIOR WALL DIASTOLIC: 0.95 CM (ref 0.6–0.9)
ECHO LVOT DIAM: 2.04 CM
ECHO LVOT PEAK GRADIENT: 6.1 MMHG
ECHO LVOT PEAK VELOCITY: 123.17 CM/S
ECHO MV A VELOCITY: 116.19 CM/S
ECHO MV AREA PHT: 3.7 CM2
ECHO MV E DECELERATION TIME (DT): 207.8 MS
ECHO MV E VELOCITY: 85.03 CM/S
ECHO MV E/A RATIO: 0.73
ECHO MV E/E' LATERAL: 23.36
ECHO MV E/E' RATIO (AVERAGED): 23.72
ECHO MV E/E' SEPTAL: 24.09
ECHO MV PRESSURE HALF TIME (PHT): 60.3 MS
ECHO PULMONARY ARTERY SYSTOLIC PRESSURE (PASP): 40.9 MMHG
ECHO PV MAX VELOCITY: 121.73 CM/S
ECHO PV PEAK GRADIENT: 5.9 MMHG
ECHO RIGHT VENTRICULAR SYSTOLIC PRESSURE (RVSP): 40.9 MMHG
ECHO RV INTERNAL DIMENSION: 3.78 CM
ECHO RV TAPSE: 2.27 CM (ref 1.5–2)
ECHO TV REGURGITANT MAX VELOCITY: 299.58 CM/S
ECHO TV REGURGITANT PEAK GRADIENT: 35.9 MMHG
EOSINOPHIL # BLD: 1.7 K/UL (ref 0–0.4)
EOSINOPHIL NFR BLD: 25 % (ref 0–7)
ERYTHROCYTE [DISTWIDTH] IN BLOOD BY AUTOMATED COUNT: 16 % (ref 11.5–14.5)
ERYTHROCYTE [SEDIMENTATION RATE] IN BLOOD: 10 MM/HR (ref 0–30)
EST. AVERAGE GLUCOSE BLD GHB EST-MCNC: 111 MG/DL
GLOBULIN SER CALC-MCNC: 4.4 G/DL (ref 2–4)
GLUCOSE SERPL-MCNC: 96 MG/DL (ref 65–100)
GLUCOSE SERPL-MCNC: 98 MG/DL (ref 65–100)
HBA1C MFR BLD: 5.5 % (ref 4–5.6)
HCT VFR BLD AUTO: 40.8 % (ref 35–47)
HDLC SERPL-MCNC: 54 MG/DL
HDLC SERPL: 2.9 {RATIO} (ref 0–5)
HGB BLD-MCNC: 12.6 G/DL (ref 11.5–16)
IMM GRANULOCYTES # BLD AUTO: 0 K/UL (ref 0–0.04)
IMM GRANULOCYTES NFR BLD AUTO: 0 % (ref 0–0.5)
INR PPP: 1 (ref 0.9–1.1)
LDLC SERPL CALC-MCNC: 87.2 MG/DL (ref 0–100)
LIPID PROFILE,FLP: NORMAL
LVFS 2D: 36.52 %
LYMPHOCYTES # BLD: 2 K/UL (ref 0.8–3.5)
LYMPHOCYTES NFR BLD: 30 % (ref 12–49)
MAGNESIUM SERPL-MCNC: 1.8 MG/DL (ref 1.6–2.4)
MCH RBC QN AUTO: 22.1 PG (ref 26–34)
MCHC RBC AUTO-ENTMCNC: 30.9 G/DL (ref 30–36.5)
MCV RBC AUTO: 71.6 FL (ref 80–99)
MONOCYTES # BLD: 0.7 K/UL (ref 0–1)
MONOCYTES NFR BLD: 11 % (ref 5–13)
MV DEC SLOPE: 4.09
NEUTS SEG # BLD: 2.3 K/UL (ref 1.8–8)
NEUTS SEG NFR BLD: 34 % (ref 32–75)
NRBC # BLD: 0 K/UL (ref 0–0.01)
NRBC BLD-RTO: 0 PER 100 WBC
P-R INTERVAL, ECG05: 144 MS
P-R INTERVAL, ECG05: 148 MS
PHOSPHATE SERPL-MCNC: 2.8 MG/DL (ref 2.6–4.7)
PLATELET # BLD AUTO: 157 K/UL (ref 150–400)
PLATELET COMMENTS,PCOM: ABNORMAL
PMV BLD AUTO: ABNORMAL FL (ref 8.9–12.9)
POTASSIUM SERPL-SCNC: 3.7 MMOL/L (ref 3.5–5.1)
POTASSIUM SERPL-SCNC: 3.7 MMOL/L (ref 3.5–5.1)
PROCALCITONIN SERPL-MCNC: <0.05 NG/ML
PROT SERPL-MCNC: 7.5 G/DL (ref 6.4–8.2)
PROTHROMBIN TIME: 10.7 SEC (ref 9–11.1)
Q-T INTERVAL, ECG07: 362 MS
Q-T INTERVAL, ECG07: 372 MS
QRS DURATION, ECG06: 92 MS
QRS DURATION, ECG06: 94 MS
QTC CALCULATION (BEZET), ECG08: 438 MS
QTC CALCULATION (BEZET), ECG08: 452 MS
RBC # BLD AUTO: 5.7 M/UL (ref 3.8–5.2)
RBC MORPH BLD: ABNORMAL
SODIUM SERPL-SCNC: 138 MMOL/L (ref 136–145)
SODIUM SERPL-SCNC: 139 MMOL/L (ref 136–145)
T4 SERPL-MCNC: 12 UG/DL (ref 4.8–13.9)
TRIGL SERPL-MCNC: 84 MG/DL (ref ?–150)
TROPONIN I SERPL-MCNC: 0.14 NG/ML
TROPONIN I SERPL-MCNC: 0.15 NG/ML
TSH SERPL DL<=0.05 MIU/L-ACNC: 3.12 UIU/ML (ref 0.36–3.74)
VENTRICULAR RATE, ECG03: 88 BPM
VENTRICULAR RATE, ECG03: 89 BPM
VLDLC SERPL CALC-MCNC: 16.8 MG/DL
WBC # BLD AUTO: 6.7 K/UL (ref 3.6–11)

## 2020-03-30 PROCEDURE — 84436 ASSAY OF TOTAL THYROXINE: CPT

## 2020-03-30 PROCEDURE — 51798 US URINE CAPACITY MEASURE: CPT

## 2020-03-30 PROCEDURE — 70551 MRI BRAIN STEM W/O DYE: CPT

## 2020-03-30 PROCEDURE — 85610 PROTHROMBIN TIME: CPT

## 2020-03-30 PROCEDURE — 74011250636 HC RX REV CODE- 250/636: Performed by: NURSE PRACTITIONER

## 2020-03-30 PROCEDURE — 36415 COLL VENOUS BLD VENIPUNCTURE: CPT

## 2020-03-30 PROCEDURE — 65610000006 HC RM INTENSIVE CARE

## 2020-03-30 PROCEDURE — 86141 C-REACTIVE PROTEIN HS: CPT

## 2020-03-30 PROCEDURE — 84443 ASSAY THYROID STIM HORMONE: CPT

## 2020-03-30 PROCEDURE — 80061 LIPID PANEL: CPT

## 2020-03-30 PROCEDURE — 83036 HEMOGLOBIN GLYCOSYLATED A1C: CPT

## 2020-03-30 PROCEDURE — 70450 CT HEAD/BRAIN W/O DYE: CPT

## 2020-03-30 PROCEDURE — 84484 ASSAY OF TROPONIN QUANT: CPT

## 2020-03-30 PROCEDURE — 94002 VENT MGMT INPAT INIT DAY: CPT

## 2020-03-30 PROCEDURE — 93306 TTE W/DOPPLER COMPLETE: CPT

## 2020-03-30 PROCEDURE — 83735 ASSAY OF MAGNESIUM: CPT

## 2020-03-30 PROCEDURE — 85652 RBC SED RATE AUTOMATED: CPT

## 2020-03-30 PROCEDURE — 85025 COMPLETE CBC W/AUTO DIFF WBC: CPT

## 2020-03-30 PROCEDURE — 84100 ASSAY OF PHOSPHORUS: CPT

## 2020-03-30 PROCEDURE — 95816 EEG AWAKE AND DROWSY: CPT | Performed by: PSYCHIATRY & NEUROLOGY

## 2020-03-30 PROCEDURE — 74011000250 HC RX REV CODE- 250: Performed by: NURSE PRACTITIONER

## 2020-03-30 PROCEDURE — 84145 PROCALCITONIN (PCT): CPT

## 2020-03-30 PROCEDURE — 74011250636 HC RX REV CODE- 250/636: Performed by: PSYCHIATRY & NEUROLOGY

## 2020-03-30 PROCEDURE — 80053 COMPREHEN METABOLIC PANEL: CPT

## 2020-03-30 RX ORDER — LORAZEPAM 2 MG/ML
1 INJECTION INTRAMUSCULAR ONCE
Status: COMPLETED | OUTPATIENT
Start: 2020-03-30 | End: 2020-03-30

## 2020-03-30 RX ORDER — HALOPERIDOL 5 MG/ML
2 INJECTION INTRAMUSCULAR
Status: DISCONTINUED | OUTPATIENT
Start: 2020-03-30 | End: 2020-03-31

## 2020-03-30 RX ORDER — LORAZEPAM 2 MG/ML
1 INJECTION INTRAMUSCULAR
Status: COMPLETED | OUTPATIENT
Start: 2020-03-30 | End: 2020-03-30

## 2020-03-30 RX ORDER — CHLORHEXIDINE GLUCONATE 0.12 MG/ML
15 RINSE ORAL EVERY 12 HOURS
Status: DISCONTINUED | OUTPATIENT
Start: 2020-03-30 | End: 2020-03-31

## 2020-03-30 RX ADMIN — SODIUM CHLORIDE, SODIUM LACTATE, POTASSIUM CHLORIDE, AND CALCIUM CHLORIDE 75 ML/HR: 600; 310; 30; 20 INJECTION, SOLUTION INTRAVENOUS at 11:49

## 2020-03-30 RX ADMIN — LORAZEPAM 1 MG: 2 INJECTION INTRAMUSCULAR; INTRAVENOUS at 15:44

## 2020-03-30 RX ADMIN — SODIUM CHLORIDE 3 MG/HR: 900 INJECTION, SOLUTION INTRAVENOUS at 08:46

## 2020-03-30 RX ADMIN — LABETALOL HYDROCHLORIDE 20 MG: 5 INJECTION INTRAVENOUS at 20:58

## 2020-03-30 RX ADMIN — LORAZEPAM 1 MG: 2 INJECTION INTRAMUSCULAR; INTRAVENOUS at 14:31

## 2020-03-30 RX ADMIN — Medication 10 ML: at 15:07

## 2020-03-30 RX ADMIN — FAMOTIDINE 20 MG: 10 INJECTION, SOLUTION INTRAVENOUS at 00:38

## 2020-03-30 RX ADMIN — Medication 10 ML: at 06:00

## 2020-03-30 RX ADMIN — Medication 20 ML: at 22:30

## 2020-03-30 RX ADMIN — HALOPERIDOL LACTATE 2 MG: 5 INJECTION INTRAMUSCULAR at 22:26

## 2020-03-30 NOTE — CONSULTS
[facility] Name:  Ricky Torres 
MR#:  [id number] :  1925 ACCOUNT #:  [de-identified] DATE OF SERVICE:  2020 REASON FOR CONSULTATION:  Intracerebral hemorrhage. HISTORY OF PRESENT ILLNESS:  The patient is a 51-year-old female. She is not able to give any history. She came into the emergency room at [facility] for fatigue. A head CT was done which showed intracerebral hemorrhage. The patient does not know why she is here. She does have a cough, but she says it is a chronic smoker's cough. She is unable to answer any questions today. Deborah Cassidy PAST MEDICAL HISTORY:  Consistent with, 
1. Hypertension. 2.  MI. 
3.  Coronary artery disease. SOCIAL HISTORY:  Smokes. Positive alcohol. MEDICATIONS:  Listed on the chart and include aspirin. FAMILY HISTORY:  Unable to obtain. ALLERGIES:  NO KNOWN DRUG ALLERGIES. REVIEW OF SYSTEMS:  Unable to obtain. PHYSICAL EXAMINATION: 
GENERAL:  She is an elderly female, sitting, in no acute distress. HEENT:  Her head is normocephalic and atraumatic. Pupils are equal, round, and reactive to light. Extraocular movements are intact. Face is symmetric. NEUROLOGIC:  She is able to lift both arms over her head, move both feet, but has trouble following any commands past that. She will tell me her first name, tell me she is in the hospital, but cannot tell me [place]. No date. Rest of her neurologic exam is unable to obtain secondary to her neurologic status. IMAGING:  CT scan shows a right temporal hemorrhage. IMPRESSION:  Right temporal hemorrhage. At this point, we will treat this conservatively. We will repeat the head CT in the morning. We will follow along with you. Salomón Worthy MD [id number] 
D:  2020 23:38 
T:  2020 2:56 JOB #:  [id number]

## 2020-03-30 NOTE — ED NOTES
Pt temp at this time is 98.5. Pt son states that pt is a chronic smoker and pt has a chronic smoker cough.

## 2020-03-30 NOTE — PROGRESS NOTES
TRANSFER - IN REPORT:    Verbal report received from Lucia Alcazar RN(name) on Ellie Room  being received from ED [facility] ED(unit) for routine progression of care      Report consisted of patients Situation, Background, Assessment and   Recommendations(SBAR). Information from the following report(s) SBAR, Kardex, ED Summary and Cardiac Rhythm NSR was reviewed with the receiving nurse. Opportunity for questions and clarification was provided. Assessment completed upon patients arrival to unit and care assumed. Patient drowsy, oriented to person, delayed speech. Pupils sluggish round,  Pt following some commands, no facial droop, tongue midline with white coating on tongue   unequal with Left weaker than the Right both  and lower extremities strength  General weakness with commands and turning  Pt has slight congested cough, non-productive. No fevers, pt unable to say whether she has pain, confused. Primary Nurse Monalisa Powell RN and Abdulaziz Paredes RN performed a dual skin assessment on this patient No impairment noted  Isidro score is 14    Patient's orientation wax/wanes, occasionally oriented to place or time.

## 2020-03-30 NOTE — PROGRESS NOTES
NURSING CARE PLANS          Problem: Falls - Risk of  Goal: *Absence of Falls  Description: Document Holden Flow Fall Risk and appropriate interventions in the flowsheet. Outcome: Progressing Towards Goal  Note: Fall Risk Interventions:  Mobility Interventions: Communicate number of staff needed for ambulation/transfer, PT Consult for mobility concerns, PT Consult for assist device competence, Strengthening exercises (ROM-active/passive)    Mentation Interventions: Door open when patient unattended, Evaluate medications/consider consulting pharmacy, More frequent rounding, Reorient patient, Room close to nurse's station, Toileting rounds, Update white board    Medication Interventions: Evaluate medications/consider consulting pharmacy    Elimination Interventions: Patient to call for help with toileting needs, Toileting schedule/hourly rounds    History of Falls Interventions: Evaluate medications/consider consulting pharmacy, Investigate reason for fall, Door open when patient unattended         Problem: Patient Education: Go to Patient Education Activity  Goal: Patient/Family Education  Outcome: Progressing Towards Goal     Problem: Pressure Injury - Risk of  Goal: *Prevention of pressure injury  Description: Document Isidro Scale and appropriate interventions in the flowsheet. Outcome: Progressing Towards Goal  Note: Pressure Injury Interventions:  Sensory Interventions: Assess changes in LOC, Assess need for specialty bed, Avoid rigorous massage over bony prominences, Minimize linen layers, Monitor skin under medical devices, Pad between skin to skin, Pressure redistribution bed/mattress (bed type), Turn and reposition approx.  every two hours (pillows and wedges if needed), Float heels    Moisture Interventions: Minimize layers, Absorbent underpads, Apply protective barrier, creams and emollients, Check for incontinence Q2 hours and as needed    Activity Interventions: Pressure redistribution bed/mattress(bed type), Assess need for specialty bed    Mobility Interventions: HOB 30 degrees or less, Pressure redistribution bed/mattress (bed type), Turn and reposition approx.  every two hours(pillow and wedges)    Nutrition Interventions: Document food/fluid/supplement intake, Discuss nutritional consult with provider                     Problem: Hemorrhagic Stroke: Admission Day (0-3 hours)  Goal: Off Pathway (Use only if patient is Off Pathway)  Outcome: Progressing Towards Goal  Goal: Activity/Safety  Outcome: Progressing Towards Goal  Goal: Consults, if ordered  Outcome: Progressing Towards Goal  Goal: Diagnostic Test/Procedures  Outcome: Progressing Towards Goal  Goal: Nutrition/Diet  Outcome: Progressing Towards Goal  Goal: Medications  Outcome: Progressing Towards Goal  Goal: Respiratory  Outcome: Progressing Towards Goal  Goal: Treatments/Interventions/Procedures  Outcome: Progressing Towards Goal  Goal: Psychosocial  Outcome: Progressing Towards Goal  Goal: *Establish/diagnose type of stroke  Outcome: Progressing Towards Goal  Goal: *Patient maintains clear airway/free of aspiration  Outcome: Progressing Towards Goal  Goal: *Hemodynamically stable  Outcome: Progressing Towards Goal     Problem: Hemorrhagic Stroke:  3-24 hours  Goal: Off Pathway (Use only if patient is Off Pathway)  Outcome: Progressing Towards Goal  Goal: Activity/Safety  Outcome: Progressing Towards Goal  Goal: Consults, if ordered  Outcome: Progressing Towards Goal  Goal: Diagnostic Test/Procedures  Outcome: Progressing Towards Goal  Goal: Nutrition/Diet  Outcome: Progressing Towards Goal  Goal: Discharge Planning  Outcome: Progressing Towards Goal  Goal: Medications  Outcome: Progressing Towards Goal  Goal: Respiratory  Outcome: Progressing Towards Goal  Goal: Treatments/Interventions/Procedures  Outcome: Progressing Towards Goal  Goal: Psychosocial  Outcome: Progressing Towards Goal  Goal: *Hemodynamically stable  Outcome: Progressing Towards Goal  Goal: *Verbalizes anxiety and depression are reduced or absent  Outcome: Progressing Towards Goal  Goal: *Absence of aspiration  Outcome: Progressing Towards Goal  Goal: *Absence of signs and symptoms of DVT  Outcome: Progressing Towards Goal  Goal: *Optimal pain control at patient's stated goal  Outcome: Progressing Towards Goal  Goal: *Tolerating diet  Outcome: Progressing Towards Goal  Goal: *Progressive mobility and function (eg: ADL's)  Outcome: Progressing Towards Goal  Goal: *Rehabilitation readiness  Outcome: Progressing Towards Goal

## 2020-03-30 NOTE — PROGRESS NOTES
Transitions of Care  Short term rehab vs home with home health  Will need therapy evaluation     Reason for Admission:   Presented with fatigue, cough and abdominal pain. Head CT: right temporal hemorrhage. RUR Score:   10 % / low                  Plan for utilizing home health:   TBD       PCP: First and Last name:  Jeovanny Velarde    Name of Practice:    Are you a current patient: Yes/No: Yes   Approximate date of last visit: Not sure                  Current Advanced Directive/Advance Care Plan: Not on file    Patient admitted with [address], left  weaker than right, follows commands. Care manager spoke with kamaljit Guajardo [phone] to introduce self and explain role. Confirmed patient lives with her son and was independent prior to admission. Per son patient uses a walker and a cane for ambulation, she has no previous home health needs. Per son patient sees her PCP Dr Huma Calloway at least yearly and uses the [facility] in [place] as her pharmacy. Confirmed her insurance to be [employer]. Discussed with him options for rehab, SNF vs inpatient rehab vs home health. Will need therapy orders and recommendations.  Olivia Whitley RN,CRM    Care Management Interventions  PCP Verified by CM: Yes(Dr Huma Calloway)  MyChart Signup: No  Discharge Durable Medical Equipment: No  Physical Therapy Consult: No  Occupational Therapy Consult: No  Speech Therapy Consult: No  Current Support Network: Own Home(Kamaljit Guajardo [phone])

## 2020-03-30 NOTE — PROGRESS NOTES
Neurosurgery Progress Note  Blaine Moritz, [facility]  [phone]        Admit Date: 3/29/2020   LOS: 1 day        Daily Progress Note: 3/30/2020      Subjective: The patient's son called EMS due to her fatigue and lack of energy. She also was altered and not sure where she was. She does have a history of dementia, but unsure what her actual baseline is. Her blood pressures were extremely elevated with SBPs in the 180-200 range. Her head CT revealed a right temporal hemorrhage. She was transferred to [facility] ICU. Her CT scans have remained stable. Still on a cardene drip for BP control. Unable to obtain ROS due to AMS. Objective:     Vital signs  Temp (24hrs), Av.8 °F (36.6 °C), Min:96.2 °F (35.7 °C), Max:100 °F (37.8 °C)   701 -  1900  In: 918.8 [I.V.:918.8]  Out: 700 [Urine:700]  1901 -  0700  In: 452.3 [I.V.:452.3]  Out: -     Visit Vitals  BP (!) 132/108   Pulse 74   Temp 96.2 °F (35.7 °C)   Resp 20   Wt 43.3 kg (95 lb 7.4 oz)   SpO2 99%   BMI 18.64 kg/m²    O2 Flow Rate (L/min): 2 l/min O2 Device: Nasal cannula     Pain control  Pain Assessment  Pain Scale 1: Numeric (0 - 10)  Pain Intensity 1: 0    PT/OT  Gait                 Physical Exam:  Gen:NAD. Neuro: Awake, alert. Fidgety. Oriented x 1. Does not know year or where she is. Thinks she is at home. Follows commands with encouragement. Not easily redirectable. Speech clear. Affect flat. PERRL. EOMI. Face symmetric. Tongue midline. WOMACK spontaneously  Negative drift. Gait deferred. CT head without contrast on 2020 shows acute right temporal hemorrhage with associated subdural hematoma. This is most  suggestive of angiopathy, subacute in nature with the surrounding and number. No significant mass effect at this time. CT head without contrast on 2020 shows slight increase in size of right temporal bilobed intraparenchymal hemorrhage but without significant midline shift.  Associated subdural hematoma  and subarachnoid blood as described in the body of the radiology report. 24 hour results:    Recent Results (from the past 24 hour(s))   EKG, 12 LEAD, INITIAL    Collection Time: 03/29/20  7:03 PM   Result Value Ref Range    Ventricular Rate 88 BPM    Atrial Rate 88 BPM    P-R Interval 144 ms    QRS Duration 94 ms    Q-T Interval 362 ms    QTC Calculation (Bezet) 438 ms    Calculated P Axis 76 degrees    Calculated R Axis 29 degrees    Calculated T Axis 78 degrees    Diagnosis       Sinus rhythm with premature atrial complexes  Possible Left atrial enlargement  When compared with ECG of 26-AUG-2018 16:31,  premature atrial complexes are now present  Vent.  rate has increased BY  33 BPM  ST no longer depressed in Anterior leads  Nonspecific T wave abnormality no longer evident in Inferior leads  Nonspecific T wave abnormality no longer evident in Anterolateral leads     URINALYSIS W/ REFLEX CULTURE    Collection Time: 03/29/20  7:54 PM   Result Value Ref Range    Color YELLOW/STRAW      Appearance CLEAR CLEAR      Specific gravity 1.015 1.003 - 1.030      pH (UA) 7.0 5.0 - 8.0      Protein NEGATIVE  NEG mg/dL    Glucose NEGATIVE  NEG mg/dL    Ketone NEGATIVE  NEG mg/dL    Bilirubin NEGATIVE  NEG      Blood NEGATIVE  NEG      Urobilinogen 1.0 0.2 - 1.0 EU/dL    Nitrites NEGATIVE  NEG      Leukocyte Esterase NEGATIVE  NEG      WBC 0-4 0 - 4 /hpf    RBC 0-5 0 - 5 /hpf    Epithelial cells FEW FEW /lpf    Bacteria NEGATIVE  NEG /hpf    UA:UC IF INDICATED CULTURE NOT INDICATED BY UA RESULT CNI      Hyaline cast 2-5 0 - 5 /lpf   CBC WITH AUTOMATED DIFF    Collection Time: 03/29/20  7:54 PM   Result Value Ref Range    WBC 7.2 3.6 - 11.0 K/uL    RBC 5.86 (H) 3.80 - 5.20 M/uL    HGB 13.1 11.5 - 16.0 g/dL    HCT 41.1 35.0 - 47.0 %    MCV 70.1 (L) 80.0 - 99.0 FL    MCH 22.4 (L) 26.0 - 34.0 PG    MCHC 31.9 30.0 - 36.5 g/dL    RDW 16.9 (H) 11.5 - 14.5 %    PLATELET 300 978 - 923 K/uL    NRBC 0.0 0  WBC ABSOLUTE NRBC 0.00 0.00 - 0.01 K/uL    NEUTROPHILS 33 32 - 75 %    LYMPHOCYTES 24 12 - 49 %    MONOCYTES 6 5 - 13 %    EOSINOPHILS 36 (H) 0 - 7 %    BASOPHILS 1 0 - 1 %    IMMATURE GRANULOCYTES 0 0.0 - 0.5 %    ABS. NEUTROPHILS 2.4 1.8 - 8.0 K/UL    ABS. LYMPHOCYTES 1.7 0.8 - 3.5 K/UL    ABS. MONOCYTES 0.4 0.0 - 1.0 K/UL    ABS. EOSINOPHILS 2.6 (H) 0.0 - 0.4 K/UL    ABS. BASOPHILS 0.1 0.0 - 0.1 K/UL    ABS. IMM. GRANS. 0.0 0.00 - 0.04 K/UL    DF AUTOMATED      RBC COMMENTS ANISOCYTOSIS  1+       METABOLIC PANEL, COMPREHENSIVE    Collection Time: 03/29/20  7:54 PM   Result Value Ref Range    Sodium 138 136 - 145 mmol/L    Potassium 3.9 3.5 - 5.1 mmol/L    Chloride 106 97 - 108 mmol/L    CO2 24 21 - 32 mmol/L    Anion gap 8 5 - 15 mmol/L    Glucose 85 65 - 100 mg/dL    BUN 17 6 - 20 MG/DL    Creatinine 1.13 (H) 0.55 - 1.02 MG/DL    BUN/Creatinine ratio 15 12 - 20      GFR est AA 54 (L) >60 ml/min/1.73m2    GFR est non-AA 45 (L) >60 ml/min/1.73m2    Calcium 9.5 8.5 - 10.1 MG/DL    Bilirubin, total 0.9 0.2 - 1.0 MG/DL    ALT (SGPT) 10 (L) 12 - 78 U/L    AST (SGOT) 15 15 - 37 U/L    Alk.  phosphatase 156 (H) 45 - 117 U/L    Protein, total 7.8 6.4 - 8.2 g/dL    Albumin 3.3 (L) 3.5 - 5.0 g/dL    Globulin 4.5 (H) 2.0 - 4.0 g/dL    A-G Ratio 0.7 (L) 1.1 - 2.2     EKG, 12 LEAD, INITIAL    Collection Time: 03/29/20  8:23 PM   Result Value Ref Range    Ventricular Rate 89 BPM    Atrial Rate 89 BPM    P-R Interval 148 ms    QRS Duration 92 ms    Q-T Interval 372 ms    QTC Calculation (Bezet) 452 ms    Calculated P Axis 84 degrees    Calculated R Axis 22 degrees    Calculated T Axis 69 degrees    Diagnosis       Normal sinus rhythm  Possible Left atrial enlargement  When compared with ECG of 29-MAR-2020 19:03,  MANUAL COMPARISON REQUIRED, DATA IS UNCONFIRMED     PTT    Collection Time: 03/29/20  9:05 PM   Result Value Ref Range    aPTT 26.5 22.1 - 32.0 sec    aPTT, therapeutic range     58.0 - 77.0 SECS   PROTHROMBIN TIME + INR Collection Time: 03/29/20  9:05 PM   Result Value Ref Range    INR 1.0 0.9 - 1.1      Prothrombin time 10.8 9.0 - 71.3 sec   METABOLIC PANEL, COMPREHENSIVE    Collection Time: 03/30/20  2:49 AM   Result Value Ref Range    Sodium 139 136 - 145 mmol/L    Potassium 3.7 3.5 - 5.1 mmol/L    Chloride 108 97 - 108 mmol/L    CO2 21 21 - 32 mmol/L    Anion gap 10 5 - 15 mmol/L    Glucose 96 65 - 100 mg/dL    BUN 16 6 - 20 MG/DL    Creatinine 1.07 (H) 0.55 - 1.02 MG/DL    BUN/Creatinine ratio 15 12 - 20      GFR est AA 58 (L) >60 ml/min/1.73m2    GFR est non-AA 48 (L) >60 ml/min/1.73m2    Calcium 9.3 8.5 - 10.1 MG/DL    Bilirubin, total 0.7 0.2 - 1.0 MG/DL    ALT (SGPT) 10 (L) 12 - 78 U/L    AST (SGOT) 17 15 - 37 U/L    Alk.  phosphatase 151 (H) 45 - 117 U/L    Protein, total 7.5 6.4 - 8.2 g/dL    Albumin 3.1 (L) 3.5 - 5.0 g/dL    Globulin 4.4 (H) 2.0 - 4.0 g/dL    A-G Ratio 0.7 (L) 1.1 - 2.2     TSH 3RD GENERATION    Collection Time: 03/30/20  2:49 AM   Result Value Ref Range    TSH 3.12 0.36 - 3.74 uIU/mL   T4 (THYROXINE)    Collection Time: 03/30/20  2:49 AM   Result Value Ref Range    T4, Total 12.0 4.8 - 13.9 ug/dL   MAGNESIUM    Collection Time: 03/30/20  2:49 AM   Result Value Ref Range    Magnesium 1.8 1.6 - 2.4 mg/dL   PHOSPHORUS    Collection Time: 03/30/20  2:49 AM   Result Value Ref Range    Phosphorus 2.8 2.6 - 4.7 MG/DL   PROTHROMBIN TIME + INR    Collection Time: 03/30/20  2:49 AM   Result Value Ref Range    INR 1.0 0.9 - 1.1      Prothrombin time 10.7 9.0 - 11.1 sec   CRP, HIGH SENSITIVITY    Collection Time: 03/30/20  2:49 AM   Result Value Ref Range    CRP, High sensitivity 1.0 mg/L   LIPID PANEL    Collection Time: 03/30/20  2:49 AM   Result Value Ref Range    LIPID PROFILE          Cholesterol, total 158 <200 MG/DL    Triglyceride 84 <150 MG/DL    HDL Cholesterol 54 MG/DL    LDL, calculated 87.2 0 - 100 MG/DL    VLDL, calculated 16.8 MG/DL    CHOL/HDL Ratio 2.9 0.0 - 5.0     METABOLIC PANEL, BASIC Collection Time: 03/30/20  3:09 AM   Result Value Ref Range    Sodium 138 136 - 145 mmol/L    Potassium 3.7 3.5 - 5.1 mmol/L    Chloride 107 97 - 108 mmol/L    CO2 21 21 - 32 mmol/L    Anion gap 10 5 - 15 mmol/L    Glucose 98 65 - 100 mg/dL    BUN 16 6 - 20 MG/DL    Creatinine 1.10 (H) 0.55 - 1.02 MG/DL    BUN/Creatinine ratio 15 12 - 20      GFR est AA 56 (L) >60 ml/min/1.73m2    GFR est non-AA 46 (L) >60 ml/min/1.73m2    Calcium 9.0 8.5 - 10.1 MG/DL   CBC WITH AUTOMATED DIFF    Collection Time: 03/30/20  3:10 AM   Result Value Ref Range    WBC 6.7 3.6 - 11.0 K/uL    RBC 5.70 (H) 3.80 - 5.20 M/uL    HGB 12.6 11.5 - 16.0 g/dL    HCT 40.8 35.0 - 47.0 %    MCV 71.6 (L) 80.0 - 99.0 FL    MCH 22.1 (L) 26.0 - 34.0 PG    MCHC 30.9 30.0 - 36.5 g/dL    RDW 16.0 (H) 11.5 - 14.5 %    PLATELET 946 425 - 531 K/uL    MPV ABNORMAL 8.9 - 12.9 FL    NRBC 0.0 0  WBC    ABSOLUTE NRBC 0.00 0.00 - 0.01 K/uL    NEUTROPHILS 34 32 - 75 %    LYMPHOCYTES 30 12 - 49 %    MONOCYTES 11 5 - 13 %    EOSINOPHILS 25 (H) 0 - 7 %    BASOPHILS 0 0 - 1 %    IMMATURE GRANULOCYTES 0 0.0 - 0.5 %    ABS. NEUTROPHILS 2.3 1.8 - 8.0 K/UL    ABS. LYMPHOCYTES 2.0 0.8 - 3.5 K/UL    ABS. MONOCYTES 0.7 0.0 - 1.0 K/UL    ABS. EOSINOPHILS 1.7 (H) 0.0 - 0.4 K/UL    ABS. BASOPHILS 0.0 0.0 - 0.1 K/UL    ABS. IMM.  GRANS. 0.0 0.00 - 0.04 K/UL    DF MANUAL      PLATELET COMMENTS Large Platelets      RBC COMMENTS MICROCYTOSIS  1+        RBC COMMENTS HYPOCHROMIA  2+        RBC COMMENTS ANISOCYTOSIS  1+       SED RATE (ESR)    Collection Time: 03/30/20  3:10 AM   Result Value Ref Range    Sed rate, automated 10 0 - 30 mm/hr   PROCALCITONIN    Collection Time: 03/30/20  3:10 AM   Result Value Ref Range    Procalcitonin <0.05 ng/mL   HEMOGLOBIN A1C WITH EAG    Collection Time: 03/30/20  3:10 AM   Result Value Ref Range    Hemoglobin A1c 5.5 4.0 - 5.6 %    Est. average glucose 111 mg/dL   TROPONIN I    Collection Time: 03/30/20  3:34 AM   Result Value Ref Range Troponin-I, Qt. 0.14 (H) <0.05 ng/mL   ECHO ADULT COMPLETE    Collection Time: 03/30/20 10:10 AM   Result Value Ref Range    LA Volume 37.89 22 - 52 mL    LV E' Lateral Velocity 3.64 cm/s    LV E' Septal Velocity 3.53 cm/s    Tapse 2.27 (A) 1.5 - 2.0 cm    Ao Root D 3.03 cm    Aortic Valve Systolic Peak Velocity 132.37 cm/s    Aortic Valve Area by Continuity of Peak Velocity 2.5 cm2    AoV PG 10.6 mmHg    LVIDd 3.45 (A) 3.9 - 5.3 cm    LVPWd 0.95 (A) 0.6 - 0.9 cm    LVIDs 2.19 cm    IVSd 1.06 (A) 0.6 - 0.9 cm    LVOT d 2.04 cm    LVOT Peak Velocity 123.17 cm/s    LVOT Peak Gradient 6.1 mmHg    MVA (PHT) 3.7 cm2    MV A Logan 116.19 cm/s    MV E Logan 85.03 cm/s    MV E/A 0.73     Left Atrium to Aortic Root Ratio 1.11     RVIDd 3.78 cm    LA Vol 4C 42.98 22 - 52 mL    LA Vol 2C 29.70 22 - 52 mL    LA Area 4C 15.4 cm2    LV Mass .1 67 - 162 g    E/E' lateral 23.36     E/E' septal 24.09     RVSP 40.9 mmHg    E/E' ratio (averaged) 23.72     Est. RA Pressure 5.0 mmHg    Mitral Valve E Wave Deceleration Time 207.8 ms    Mitral Valve Pressure Half-time 60.3 ms    Left Atrium Major Axis 3.36 cm    Triscuspid Valve Regurgitation Peak Gradient 35.9 mmHg    Pulmonic Valve Max Velocity 121.73 cm/s    TR Max Velocity 299.58 cm/s    PASP 40.9 mmHg    Left Ventricular Fractional Shortening by 2D [phone] %    Mitral Valve Deceleration Cross [id number]     AV Velocity Ratio 0.76     PV peak gradient 5.9 mmHg          Assessment:     Principal Problem:    ICH (intracerebral hemorrhage) ([facility] [address] (3/29/2020)        Plan:   1. Right temporal ICH   - No surgical intervention   - ok to eat from NSGY standpoint if passes bedside STAND   - neuro checks q2h   - good BP control  2. Cerebral edema   - due to #1   - steroids not indicated   - plans as above  3. Dementia with altered mental status   - supportive care   - unsure of dementia baseline  4.  HTN emergency   - Cardene PRN   - SBP<140   - Labetalol PRN    Activity: up with assist  DVT ppx: SCDs  Dispo: tbd    Plan d/w Dr. Valery Ridley, ICU nurse      Crispin Delarosa NP

## 2020-03-30 NOTE — PROGRESS NOTES
[facility]    ICU TEAM Progress Note    Name: Cosme Seay   : 1925   MRN: [id number]   Date: 3/30/2020      Subjective:   Progress Note: 3/30/2020      Reason for ICU Admission: Pt presented with altered mental status and lethargy. CT head revealed R temporal intraparenchymal hemorrhage. No intervention done. CT head done with stable bleed      Active Problem List:     Problem List  Date Reviewed: 2014          Codes Class    * (Principal) ICH (intracerebral hemorrhage) ([facility] [address] ICD-10-CM: I61.9  ICD-9-CM: 965         Acute respiratory failure ([facility] [address] ICD-10-CM: J96.00  ICD-9-CM: 518.81         Acute bronchitis ICD-10-CM: J20.9  ICD-9-CM: 466.0         Elevated troponin ICD-10-CM: R79.89  ICD-9-CM: 790.6         HTN (hypertension) ICD-10-CM: I10  ICD-9-CM: 401.9         UTI (urinary tract infection) ICD-10-CM: N39.0  ICD-9-CM: 599.0         Chest pain, unspecified ICD-10-CM: R07.9  ICD-9-CM: 786.50               Past Medical History:      has a past medical history of Hypertension and Other ill-defined conditions(799.89). Past Surgical History:      has no past surgical history on file. Home Medications:     Prior to Admission medications    Medication Sig Start Date End Date Taking? Authorizing Provider   methylPREDNISolone (MEDROL, ELIEL,) 4 mg tablet Take as instructed 3/23/18   Bry Myers MD   albuterol (PROVENTIL HFA, VENTOLIN HFA, PROAIR HFA) 90 mcg/actuation inhaler Take 2 Puffs by inhalation every four (4) hours as needed for Wheezing. 3/23/18   Bry Myers MD   PSEUDOEPHEDRINE-guaiFENesin [facility])  mg per tablet Take 1 Tab by mouth every twelve (12) hours. 3/23/18   Bry Myers MD   HYDROcodone-acetaminophen [facility]) 5-325 mg per tablet Take 1 Tab by mouth every four (4) hours as needed for Pain. Max Daily Amount: 6 Tabs.  3/28/15   Ollie Demarco MD   predniSONE (DELTASONE) 20 mg tablet Take 1 tab po daily for 3 days, then 1/2 tab po daily for 3 days 14 Milana Brady MD   traMADol Ab Lade) 50 mg tablet Take 50 mg by mouth as needed. Provider, Historical   aspirin 81 mg tablet Take 81 mg by mouth daily. Other, MD Dwayne       Allergies/Social/Family History:     No Known Allergies   Social History     Tobacco Use    Smoking status: Current Every Day Smoker    Smokeless tobacco: Never Used   Substance Use Topics    Alcohol use: Yes      No family history on file. Review of Systems:     Review of systems not obtained due to patient factors. - patient with baseline dementia and AMS    Objective:   Vital Signs:  Visit Vitals  /74   Pulse 72   Temp 97.3 °F (36.3 °C)   Resp 25   Wt 43.3 kg (95 lb 7.4 oz)   SpO2 94%   BMI 18.64 kg/m²    O2 Flow Rate (L/min): 2 l/min O2 Device: Nasal cannula Temp (24hrs), Av.2 °F (36.8 °C), Min:97.1 °F (36.2 °C), Max:100 °F (37.8 °C)           Intake/Output:     Intake/Output Summary (Last 24 hours) at 3/30/2020 0809  Last data filed at 3/30/2020 0700  Gross per 24 hour   Intake 452.25 ml   Output --   Net 452.25 ml       Physical Exam:    General:  Alert, awake, interactive, NAD   Eyes:  Sclera anicteric. Pupils equally round and reactive to light. Mouth/Throat: Mucous membranes normal, oral pharynx clear   Neck: Supple   Lungs:   Clear to auscultation bilaterally, good effort   CV:  Regular rate and rhythm,no murmur, click, rub or gallop   Abdomen:   Soft, non-tender.  bowel sounds normal. non-distended   Extremities: No cyanosis or edema   Skin: Skin color, texture, turgor normal. no acute rash or lesions   Lymph nodes: Cervical and supraclavicular normal   Musculoskeletal: No swelling or deformity   Lines/Devices:  Intact, no erythema, drainage or tenderness   Psych: Alert and oriented to self (was able to read whiteboard and tell me the location), she follows commands in all extremities 4/5 strength throughout, normal mood affect given the setting       LABS AND  DATA: Personally reviewed  Recent Labs 03/30/20 0310 03/29/20 1954   WBC 6.7 7.2   HGB 12.6 13.1   HCT 40.8 41.1    159     Recent Labs     03/30/20  0309 03/30/20 0249    139   K 3.7 3.7    108   CO2 21 21   BUN 16 16   CREA 1.10* 1.07*   GLU 98 96   CA 9.0 9.3   MG  --  1.8   PHOS  --  2.8     Recent Labs     03/30/20 0249 03/29/20 1954   SGOT 17 15   * 156*   TP 7.5 7.8   ALB 3.1* 3.3*   GLOB 4.4* 4.5*     Recent Labs     03/30/20 0249 03/29/20 2105   INR 1.0 1.0   PTP 10.7 10.8   APTT  --  26.5      No results for input(s): PHI, PCO2I, PO2I, FIO2I in the last 72 hours. No results for input(s): CPK, CKMB, TROIQ, BNPP in the last 72 hours. Hemodynamics:   PAP:   CO:     Wedge:   CI:     CVP:    SVR:       PVR:       Ventilator Settings:  Mode Rate Tidal Volume Pressure FiO2 PEEP                    Peak airway pressure:      Minute ventilation:          MEDS: Reviewed    Chest X-Ray: personally reviewed and report checked    3/30 TTE:  · Normal systolic function (ejection fraction normal). Small left ventricle. Increased wall thickness. Estimated left ventricular ejection fraction is 65 - 70%. Mild (grade 1) left ventricular diastolic dysfunction. · Mildly dilated left atrium. · Mitral valve non-specific thickening. Assessment:     ICU Problems:  - Right temporal ICH with cerebral edema  - Acute on chronic encephalopathy (baseline dementia)  - Hypertensive urgency  - acute bronchitis    ICU Comprehensive Plan of Care:   Plans for this Shift:   1. Maintain SBP < 140, nicardipine as needed  2. q2h neurochecks  3. CT and MRI completed- stable  4. No steroids indicated for cerebral edema  5. Bedside swallow  6.  PT/OT ordered    Multidisciplinary Rounds Completed:  No    ABCDEF Bundle/Checklist  Pain Medications: None  Target RASS: 0 - Alert & Calm - Spontaneously pays attention to caregiver  Sedation Medications: None  CAM-ICU:  Positive  Mobility: Fair  PT/OT: PT consulted and on board and OT consulted and on board Restraints: None needed at this time  Discussed Plan of Care (goals of care): Yes  Addressed Code Status: Full Code    CARDIOVASCULAR  Cardiac Gtts: None  SBP Goal of: < 140 mmHg  MAP Goal of: > 65 mmHg  Transfusion Trigger (Hgb): <7 g/dL and <50K platelets    RESPIRATORY  Vent Goals:   N/A   DVT Prophylaxis (if no, list reason): No VTE Prophylaxis Needed   SPO2 Goal: > 92%  Pulmonary toilet: Incentive Spirometry     GI/  Darby Catheter Present: Yes  GI Prophylaxis: Pepcid (famotidine)   Nutrition: Pending Speech eval  IVFs: LR @ 75  Bowel Movement: Pending  Bowel Regimen: None needed at this time  Insulin: NI    ANTIBIOTICS  Antibiotics:  none    T/L/D  Tubes: None  Lines: Peripheral IV  Drains: Darby Catheter    SPECIAL EQUIPMENT  None    DISPOSITION  Stay in ICU    CRITICAL CARE CONSULTANT NOTE  I had a face to face encounter with the patient, reviewed and interpreted patient data including clinical events, labs, images, vital signs, I/O's, and examined patient. I have discussed the case and the plan and management of the patient's care with the consulting services, the bedside nurses and the respiratory therapist.      NOTE OF PERSONAL INVOLVEMENT IN CARE   This patient has a high probability of imminent, clinically significant deterioration, which requires the highest level of preparedness to intervene urgently. I participated in the decision-making and personally managed or directed the management of the following life and organ supporting interventions that required my frequent assessment to treat or prevent imminent deterioration. I personally spent 30 minutes of critical care time. This is time spent at this critically ill patient's bedside actively involved in patient care as well as the coordination of care and discussions with the patient's family. This does not include any procedural time which has been billed separately.     EUGENE Stone  [facility]  3/30/2020

## 2020-03-30 NOTE — ED NOTES
TRANSFER - OUT REPORT: 
 
Verbal report given to KRZYSZTOF MELO on [facility]  being transferred to [facility] ICU(unit) for ordered procedure Report consisted of patients Situation, Background, Assessment and  
Recommendations(SBAR). Information from the following report(s) SBAR, ED Summary, [facility] and Recent Results was reviewed with the receiving nurse. Lines:  
Peripheral IV 03/29/20 Right Antecubital (Active) Site Assessment Clean, dry, & intact 3/29/2020  7:19 PM  
Phlebitis Assessment 0 3/29/2020  7:19 PM  
Infiltration Assessment 0 3/29/2020  7:19 PM  
Dressing Status Clean, dry, & intact 3/29/2020  7:19 PM  
Hub Color/Line Status Capped;Flushed 3/29/2020  7:19 PM  
  
 
Opportunity for questions and clarification was provided. Patient transported with: 
 Monitor [facility]

## 2020-03-30 NOTE — PROGRESS NOTES
Bedside shift change report given to [address] and Oumar Dos Santos RN(oncoming nurse) by Tio Vogt RN (offgoing nurse). Report included the following information SBAR, Kardex, Procedure Summary, Intake/Output, MAR, Recent Results and Cardiac Rhythm NSR.         0800 - bedside assessment, pt agitated, not following commands, confused, saying she needs to urinate. 3835 - bladder scanned with >500    0825 - attempted to straight cath, pt was fighting and refusing to allow cath. Straight cath was unsuccessful. 7503 - chávez placement was successful with 4 people helping to hold. Chávez drained 550ml. Pt states she was feeling much better and is acting less agitated. Sitter remains in the room d/t AMS    0900 - pt bathed    1330 - EEG at bedside, pt extremely agitated, swatting, kicking, biting, verbally abusive. Stayed at bed side with sitter to hold pt and allow EEG tech to complete the test.  Pt is mostly agitated when lying down, once able to sit up becomes more cooperative. 1400 - blood pressure appears very sensitive to pt position. Does not tolerate lying down very well.  cardene increased.     1500 - off floor for scheduled MRI    1550 - back on unit, pt tolerated well with 1mg ativan x 2

## 2020-03-30 NOTE — H&P
[facility]    ICU Intensivist- Critical Care Progress Note    Name: Regi Rios   : 1925   MRN: [id number]   Admit: 3/29/2020 10:23 PM      Diagnosis:     Principal Problem:    ICH (intracerebral hemorrhage) ([address]       Problem List:  2020: ICH (intracerebral hemorrhage) ([address]  2014: Acute respiratory failure ([facility] [address]  2014: Acute bronchitis  2014: Elevated troponin  2014: HTN (hypertension)  2014: UTI (urinary tract infection)  2013: Chest pain, unspecified        ICU Comprehensive Plan of Care:     Plans for this Shift:   1. Neurological -Neurology has been consulted. Neurosurgery is following the patient and has been consulted. Continue neurochecks. Currently patient is oriented x1. Patient will answer appropriately. Will follow neurosurgery recommendations. Patient has acute intracerebral hemorrhage with no shift and associated subdural hemorrhage. 2.  Hemodynamics -goal is to maintain systolic blood pressure less than 140. We will continue with PRN labetalol. Continue to monitor vital signs. Patient appears to have hypertensive emergency with systolic blood pressure greater than 180. Patient started on Cardene drip. 3.  Respiratory -patient has history of acute bronchitis. We will continue with albuterol as needed inhaled. No signs of hypoxemia. Continue pulse oximetry. Chest x-ray in a.m. Subjective:     Progress Note:   3/29/2020   11:20 PM     Reason for ICU Admission:   ICH (intracerebral hemorrhage) ([facility] [address]   Hypertensive emergency  Altered mental status    HPI:Patient Tiny Glance is a 79-year-old female seen and examined today by critical care services due to initial complaints of altered mental status. Patient has past medical history for bronchitis, hypertension only. According to emergency room physician patient had initial complaints of fatigue and her son called EMS due to patient's lack of energy.   Patient was altered and did not know where she was at. Patient stated she did have a cough with abdominal pain and weakness associated. Patient initial temperature 100 °F.  No productive cough. Neurosurgery has been consulted for her acute intracranial hemorrhage with subdural hematoma extension. Plan is to continue neurochecks. Obtain respiratory viral panel to rule out any influenza. We will continue patient on Cardene drip for her life-threatening hypertensive emergency. Plan is to follow neurosurgery and neurology recommendations for her life-threatening intercerebral hemorrhage. Repeat CT scan in a.m. Continue neurochecks. Continue ICU support. Past Medical History:      has a past medical history of Hypertension and Other ill-defined conditions(799.89). Past Surgical History:      has no past surgical history on file. Current Medications:     Current Facility-Administered Medications   Medication Dose Route Frequency    labetaloL (NORMODYNE;TRANDATE) injection 20 mg  20 mg IntraVENous Q2H PRN    lactated Ringers infusion  75 mL/hr IntraVENous CONTINUOUS    dilTIAZem (CARDIZEM) 125 mg in dextrose 5% 125 mL infusion  0-15 mg/hr IntraVENous TITRATE    sodium chloride (NS) flush 5-40 mL  5-40 mL IntraVENous Q8H    sodium chloride (NS) flush 5-40 mL  5-40 mL IntraVENous PRN    acetaminophen (TYLENOL) tablet 650 mg  650 mg Oral Q4H PRN    ondansetron (ZOFRAN) injection 4 mg  4 mg IntraVENous Q4H PRN    [START ON 3/30/2020] docusate sodium (COLACE) capsule 100 mg  100 mg Oral BID    famotidine (PF) (PEPCID) 20 mg in 0.9% sodium chloride 10 mL injection  20 mg IntraVENous Q24H    albuterol (PROVENTIL VENTOLIN) nebulizer solution 2.5 mg  2.5 mg Nebulization Q4H PRN       Allergies/Social/Family History:     No Known Allergies   Social History     Tobacco Use    Smoking status: Current Every Day Smoker    Smokeless tobacco: Never Used   Substance Use Topics    Alcohol use: Yes      No family history on file.     Review of Systems:     Cardiovascular: positive for irregular heart beats, fatigue  Musculoskeletal:negative except for muscle weakness  Neurological: positive for weakness, negative for dizziness, vertigo, seizures and speech problems    Objective:   Vital Signs:  Visit Vitals  BP (!) 134/119   Pulse 65   Temp 98.2 °F (36.8 °C)   Resp 20   SpO2 93%      O2 Device: Room air Temp (24hrs), Av.9 °F (37.2 °C), Min:98.2 °F (36.8 °C), Max:100 °F (37.8 °C)           Intake/Output:   No intake or output data in the 24 hours ending 20    Physical Exam:    General:   Alert, cooperative, severe distress, appears stated age. Eyes:   Conjunctivae/corneas clear. PERRL, EOMs intact. Ears:   Normal external ear canals bilaterally. Nose:   No drainage or sinus tenderness. Mouth/Throat:  Moist mucous membranes. Dentition normal.    Neck:  Symmetrical, trachea midline, no JVD or carotid bruit. Back:    No CVA tenderness to percussion. Lungs:    Clear to auscultation bilaterally. No wheezes. No rhonchi. Heart:   Regular rate and rhythm. S1, S2 normal, without murmur, click, rub or gallop. Abdomen:    Normoactive bowel sounds. Soft, non-tender, no masses or organomegaly. Extremities:  Atraumatic, no cyanosis or edema. Vascular:  Pulses 2+ and symmetric UE/LE bilat   Skin:  Normal color, non-diaphoretic, positive capillary refill (less than 4 seconds). No rashes or lesions. Lymph nodes:  No Lymphadenopathy. Neurologic:  CN II-XII intact. Decreased strength.   GCS 14       LABS AND  DATA: Personally reviewed  Recent Labs     20   WBC 7.2   HGB 13.1   HCT 41.1        Recent Labs     20      K 3.9      CO2 24   BUN 17   CREA 1.13*   GLU 85   CA 9.5     Recent Labs     20   SGOT 15   *   TP 7.8   ALB 3.3*   GLOB 4.5*     Recent Labs     20   INR 1.0   PTP 10.8   APTT 26.5      No results for input(s): PHI, PCO2I, PO2I, FIO2I in the last 72 hours. No results for input(s): CPK, CKMB, TROIQ, BNPP in the last 72 hours. Ventilator Settings:  Mode Rate Tidal Volume Pressure FiO2 PEEP                    Peak airway pressure:      Minute ventilation:          MEDS: Reviewed    RADIOLOGY:  Ct Head Wo Cont    Result Date: 3/29/2020  IMPRESSION: Acute right temporal hemorrhage with associated subdural hematoma. This is most suggestive of angiopathy, subacute in nature with the surrounding and number. No significant mass effect at this time. Xr Chest Port    Result Date: 3/29/2020  IMPRESSION: Emphysema without apparent infiltrate. Assessment:     Hospital Problems  Date Reviewed: 12/29/2014          Codes Class Noted POA    ICH (intracerebral hemorrhage) ([facility] [address] ICD-10-CM: I61.9  ICD-9-CM: 280  3/29/2020 Unknown                  Multidisciplinary Rounds Completed: Yes    ABCDEF Bundle/Checklist  Pain Medications: None  Mobility: Bedrest  PT/OT: PT consulted and on board and OT consulted and on board   Discussed Plan of Care (goals of care):  Yes  Addressed Code Status: Full Code    CARDIOVASCULAR  Cardiac Gtts: Nicardipine (Cardene)  SBP Goal of: > 90 mmHg blood pressure less than 140  MAP Goal of: > 65 mmHg  Transfusion Trigger (Hgb): <7 g/dL    RESPIRATORY    DVT Prophylaxis (if no, list reason): SCD's or Sequential Compression Device   SPO2 Goal: > 92%  Pulmonary toilet: Albuterol     GI/  Darby Catheter Present: No  GI Prophylaxis: Pepcid (famotidine)   Nutrition: No   IVFs: Lactated Ringer's at 75 cc/h  Bowel Movement: No  Bowel Regimen: Docusate (Colace)  Insulin: Insulin sliding scale    ANTIBIOTICS  Antibiotics:  None    T/L/D  Tubes: None  Lines: Peripheral IV  Drains: None    SPECIAL EQUIPMENT  None    DISPOSITION  Stay in ICU    CRITICAL CARE CONSULTANT NOTE  I provided a face-to-face bedside physician/patient encounter, greater than the usual and customary amount normally needed, due to the high complexity of medical decision-making required. I reviewed and interpreted patient data including clinical events, labs, images, vital signs, I/O's, and examined patient. I have actively participated in multi-disciplinary discussions (laboratory, radiology, nursing staff) regarding the case in formulating an optimal therapeutic plan, and effecting a management strategy for this patient. NOTE OF PERSONAL INVOLVEMENT IN CARE   This patient has a high probability of imminent, clinically significant deterioration, which requires the highest level of preparedness to intervene urgently. I participated in the decision-making and personally managed, or directed the management of, a myriad of life and organ supporting interventions which required my frequent-interval clinical reassessments, in order to treat or prevent imminent deterioration. I personally spent 35 minutes of critical care time. This is time spent at this critically ill patient's bedside actively involved in patient care as well as the coordination of care and discussions with the patient's family. This does not include any procedural time which has been billed separately.     Gretel Ly [facility]-BC  Staff [address]  3/29/2020

## 2020-03-30 NOTE — CONSULTS
Neurosurgery  Pt seen and examined, full consult to follow. Confusion , fatigue    CT shows right temporal hemorrhage.     Treat conservatively     Repeat head CT in am    Will follow

## 2020-03-30 NOTE — CONSULTS
Neurology [address], NP  Neurocritical Care Practitioner  [phone]    Patient: Cosme Seay MRN: [id number]  SSN: [id number]    YOB: 1925  Age: 80 y.o. Sex: female      Chief Complaint: AMS    Subjective:      Cosme Seay is a 80 y.o. female with a pmh of HTN and MI who presented to ED [facility] ED via EMS reporting fatigue and confusion. CT of her head was performed, which showed right temporal hematoma measuring 3.3 x 1.8 x 1.3 cm and more medially, a hemorrhage measuring 3.5 mm. Also a small SDH measuring 5.7 x 4.1 x 4.3 cm, and in one of the sulci, hemorrhage that measures 8.3 x 6.6 x 6.4 mm. Pt with waxing and waning mental status, per chart review she may have fallen last week. She was hypertensive in ED with BP of 207/99. Pt takes ASA 81 mg daily, no other blood thinners. 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 NSGY was consulted, no plans for surgical intervention at this time. She was then transferred to [facility] [place] for higher level of care. Past Medical History:   Diagnosis Date    Hypertension     Other ill-defined conditions(799.89)     2 MI's last one in 2007     No family history on file. Social History     Tobacco Use    Smoking status: Current Every Day Smoker    Smokeless tobacco: Never Used   Substance Use Topics    Alcohol use: Yes      Prior to Admission Medications   Prescriptions Last Dose Informant Patient Reported? Taking? HYDROcodone-acetaminophen (NORCO) 5-325 mg per tablet   No No   Sig: Take 1 Tab by mouth every four (4) hours as needed for Pain. Max Daily Amount: 6 Tabs. PSEUDOEPHEDRINE-guaiFENesin (MUCINEX D)  mg per tablet   No No   Sig: Take 1 Tab by mouth every twelve (12) hours. albuterol (PROVENTIL HFA, VENTOLIN HFA, PROAIR HFA) 90 mcg/actuation inhaler   No No   Sig: Take 2 Puffs by inhalation every four (4) hours as needed for Wheezing.    aspirin 81 mg tablet   Yes No Sig: Take 81 mg by mouth daily. methylPREDNISolone (MEDROL, ELIEL,) 4 mg tablet   No No   Sig: Take as instructed   predniSONE (DELTASONE) 20 mg tablet   No No   Sig: Take 1 tab po daily for 3 days, then 1/2 tab po daily for 3 days   traMADol (ULTRAM) 50 mg tablet   Yes No   Sig: Take 50 mg by mouth as needed. Facility-Administered Medications: None       No Known Allergies    Review of Systems:  Pertinent items are noted in the History of Present Illness. Objective:     Vitals:    03/29/20 2230 03/29/20 2300 03/29/20 2330   BP: 142/64 (!) 134/119 163/65   Pulse: 67 65 60   Resp: 23 20 25   Temp: 98.2 °F (36.8 °C)     SpO2: 94% 93% 96%      Physical Exam:  GENERAL: Calm, cooperative, NAD  SKIN: Warm, dry, color appropriate for ethnicity. Neurologic Exam:  Mental Status:  Alert but with waxing and waning mental status. Language:    Normal fluency, repetition, comprehension and naming. Cranial Nerves:   Pupils 2 mm, equal, round and reactive to light. Visual fields full to confrontation. Extraocular movements intact. Facial sensation intact. Full facial strength, no asymmetry. Hearing grossly intact bilaterally. No dysarthria. Tongue protrudes to midline, palate elevates symmetrically. Shoulder shrug 5/5 bilaterally. Motor:    No pronator drift. Bulk and tone normal.      Trace left sided weakness. No involuntary movements. Sensation:    Sensation intact throughout to light touch. Coordination & Gait: Gait deferred.     Labs:  Lab Results   Component Value Date/Time    WBC 7.2 03/29/2020 07:54 PM    HGB 13.1 03/29/2020 07:54 PM    HCT 41.1 03/29/2020 07:54 PM    PLATELET 861 39/11/5293 07:54 PM    MCV 70.1 (L) 03/29/2020 07:54 PM      Lab Results   Component Value Date/Time    Sodium 138 03/29/2020 07:54 PM    Potassium 3.9 03/29/2020 07:54 PM    Chloride 106 03/29/2020 07:54 PM    CO2 24 03/29/2020 07:54 PM    Anion gap 8 03/29/2020 07:54 PM Glucose 85 03/29/2020 07:54 PM    BUN 17 03/29/2020 07:54 PM    Creatinine 1.13 (H) 03/29/2020 07:54 PM    BUN/Creatinine ratio 15 03/29/2020 07:54 PM    GFR est AA 54 (L) 03/29/2020 07:54 PM    GFR est non-AA 45 (L) 03/29/2020 07:54 PM    Calcium 9.5 03/29/2020 07:54 PM     Lab Results   Component Value Date/Time    CK 63 08/26/2018 04:38 PM    CK - MB 2.2 03/23/2018 01:20 PM    CK-MB Index 2.4 03/23/2018 01:20 PM    Troponin-I, Qt. <0.05 08/26/2018 04:38 PM     Imaging:  CT Results:  Results from Hospital Encounter encounter on 03/29/20   CT HEAD WO CONT    Narrative EXAM: CT HEAD WO CONT    INDICATION: Acute mental status change. Fatigue. Hypertension. COMPARISON: 8/26/2018. CONTRAST: None. TECHNIQUE: Unenhanced CT of the head was performed using 5 mm images. Brain and  bone windows were generated. CT dose reduction was achieved through use of a  standardized protocol tailored for this examination and automatic exposure  control for dose modulation. FINDINGS:  The ventricles and sulci are normal in size, shape and configuration and  midline. There is a right temporal new hematoma that measures approximately 3.3  x 1.8 x 1.3 cm with a surrounding hypoechoic penumbra. More medial, there is a  3.5 mm focus of hemorrhage (series 2, image 13). There is a small subdural  collection that measures 5.7 x 4.1 x 4.3 cm. In one of the sulci, there is an  additional extra-axial collection that measures 8.3 x 6.6 x 6.4 mm. In addition,  there are 2 well-defined lacunar infarcts including one at the genuine of the  right internal capsule that measures 8 mm and one in the right external capsule  that measures 7 mm. Mild periventricular white matter hypodensity is seen. There  is no mass, mass effect or midline shift. The basilar cisterns are open. No  acute infarct is identified. The bone windows demonstrate no abnormalities. There is partial opacification of the posterior left ethmoid air cells. Impression IMPRESSION:   Acute right temporal hemorrhage with associated subdural hematoma. This is most  suggestive of angiopathy, subacute in nature with the surrounding and number. No  significant mass effect at this time. Assessment:     Hospital Problems  Date Reviewed: 12/29/2014          Codes Class Noted POA    * (Principal) ICH (intracerebral hemorrhage) ([facility] [address] ICD-10-CM: I61.9  ICD-9-CM: 192  3/29/2020 Unknown            Plan:     Intracerebral Hemorrhage, ICH score: 2   - CT head showed right temporal hematoma measuring 3.3 x 1.8 x 1.3 cm and more medially, a hemorrhage measuring 3.5 mm. Also a small SDH measuring 5.7 x 4.1 x 4.3 cm, and in one of the sulci, hemorrhage that measures 8.3 x 6.6 x 6.4 mm   - SBP goal less than 140, Cardene/Labetalol PRN   - q1 neuro checks    - A1C and lipid panel pending, LDL goal <70              - ECHO ordered              - PT/OT/SLP evals              - Stroke education   - NSGY following, no surgery recommended    I have discussed the diagnosis and the intended plan as seen in the above orders with Dr. Zoe Suárez, Dr. Macy Corona and the [address] [place] [address] NP Jasvir Ray. Thank you for this consult and participating in the care of this patient.   Signed By: Kamron Thakkar NP     03/30/20

## 2020-03-31 ENCOUNTER — APPOINTMENT (OUTPATIENT)
Dept: VASCULAR SURGERY | Age: 85
DRG: 064 | End: 2020-03-31
Attending: NURSE PRACTITIONER
Payer: MEDICARE

## 2020-03-31 ENCOUNTER — APPOINTMENT (OUTPATIENT)
Dept: CT IMAGING | Age: 85
DRG: 064 | End: 2020-03-31
Attending: SPECIALIST
Payer: MEDICARE

## 2020-03-31 PROBLEM — J44.9 COPD (CHRONIC OBSTRUCTIVE PULMONARY DISEASE) (HCC): Status: ACTIVE | Noted: 2020-03-31

## 2020-03-31 LAB
ANION GAP SERPL CALC-SCNC: 8 MMOL/L (ref 5–15)
BASOPHILS # BLD: 0.1 K/UL (ref 0–0.1)
BASOPHILS NFR BLD: 1 % (ref 0–1)
BUN SERPL-MCNC: 10 MG/DL (ref 6–20)
BUN/CREAT SERPL: 10 (ref 12–20)
CALCIUM SERPL-MCNC: 9.1 MG/DL (ref 8.5–10.1)
CHLORIDE SERPL-SCNC: 101 MMOL/L (ref 97–108)
CO2 SERPL-SCNC: 28 MMOL/L (ref 21–32)
CREAT SERPL-MCNC: 0.99 MG/DL (ref 0.55–1.02)
DIFFERENTIAL METHOD BLD: ABNORMAL
EOSINOPHIL # BLD: 1.3 K/UL (ref 0–0.4)
EOSINOPHIL NFR BLD: 22 % (ref 0–7)
ERYTHROCYTE [DISTWIDTH] IN BLOOD BY AUTOMATED COUNT: 15.8 % (ref 11.5–14.5)
GLUCOSE BLD STRIP.AUTO-MCNC: 128 MG/DL (ref 65–100)
GLUCOSE BLD STRIP.AUTO-MCNC: 140 MG/DL (ref 65–100)
GLUCOSE BLD STRIP.AUTO-MCNC: 62 MG/DL (ref 65–100)
GLUCOSE BLD STRIP.AUTO-MCNC: 66 MG/DL (ref 65–100)
GLUCOSE BLD STRIP.AUTO-MCNC: 87 MG/DL (ref 65–100)
GLUCOSE SERPL-MCNC: 75 MG/DL (ref 65–100)
HCT VFR BLD AUTO: 38.4 % (ref 35–47)
HGB BLD-MCNC: 11.8 G/DL (ref 11.5–16)
IMM GRANULOCYTES # BLD AUTO: 0 K/UL (ref 0–0.04)
IMM GRANULOCYTES NFR BLD AUTO: 0 % (ref 0–0.5)
INR PPP: 1 (ref 0.9–1.1)
LYMPHOCYTES # BLD: 1.2 K/UL (ref 0.8–3.5)
LYMPHOCYTES NFR BLD: 19 % (ref 12–49)
MAGNESIUM SERPL-MCNC: 1.5 MG/DL (ref 1.6–2.4)
MCH RBC QN AUTO: 22 PG (ref 26–34)
MCHC RBC AUTO-ENTMCNC: 30.7 G/DL (ref 30–36.5)
MCV RBC AUTO: 71.6 FL (ref 80–99)
MONOCYTES # BLD: 0.4 K/UL (ref 0–1)
MONOCYTES NFR BLD: 7 % (ref 5–13)
NEUTS SEG # BLD: 3.1 K/UL (ref 1.8–8)
NEUTS SEG NFR BLD: 51 % (ref 32–75)
NRBC # BLD: 0 K/UL (ref 0–0.01)
NRBC BLD-RTO: 0 PER 100 WBC
PHOSPHATE SERPL-MCNC: 2.4 MG/DL (ref 2.6–4.7)
PLATELET # BLD AUTO: 121 K/UL (ref 150–400)
PLATELET COMMENTS,PCOM: ABNORMAL
POTASSIUM SERPL-SCNC: 3.8 MMOL/L (ref 3.5–5.1)
PROTHROMBIN TIME: 10.5 SEC (ref 9–11.1)
RBC # BLD AUTO: 5.36 M/UL (ref 3.8–5.2)
RBC MORPH BLD: ABNORMAL
RBC MORPH BLD: ABNORMAL
SERVICE CMNT-IMP: ABNORMAL
SERVICE CMNT-IMP: NORMAL
SERVICE CMNT-IMP: NORMAL
SODIUM SERPL-SCNC: 137 MMOL/L (ref 136–145)
WBC # BLD AUTO: 6.1 K/UL (ref 3.6–11)

## 2020-03-31 PROCEDURE — 85025 COMPLETE CBC W/AUTO DIFF WBC: CPT

## 2020-03-31 PROCEDURE — 74011000250 HC RX REV CODE- 250: Performed by: NURSE PRACTITIONER

## 2020-03-31 PROCEDURE — 83735 ASSAY OF MAGNESIUM: CPT

## 2020-03-31 PROCEDURE — 74011250636 HC RX REV CODE- 250/636: Performed by: NURSE PRACTITIONER

## 2020-03-31 PROCEDURE — 74011000258 HC RX REV CODE- 258: Performed by: NURSE PRACTITIONER

## 2020-03-31 PROCEDURE — 80048 BASIC METABOLIC PNL TOTAL CA: CPT

## 2020-03-31 PROCEDURE — 70450 CT HEAD/BRAIN W/O DYE: CPT

## 2020-03-31 PROCEDURE — 65660000000 HC RM CCU STEPDOWN

## 2020-03-31 PROCEDURE — 93880 EXTRACRANIAL BILAT STUDY: CPT

## 2020-03-31 PROCEDURE — 36415 COLL VENOUS BLD VENIPUNCTURE: CPT

## 2020-03-31 PROCEDURE — 84100 ASSAY OF PHOSPHORUS: CPT

## 2020-03-31 PROCEDURE — 92610 EVALUATE SWALLOWING FUNCTION: CPT | Performed by: SPEECH-LANGUAGE PATHOLOGIST

## 2020-03-31 PROCEDURE — 85610 PROTHROMBIN TIME: CPT

## 2020-03-31 PROCEDURE — 82962 GLUCOSE BLOOD TEST: CPT

## 2020-03-31 RX ORDER — DEXTROSE MONOHYDRATE AND SODIUM CHLORIDE 5; .45 G/100ML; G/100ML
50 INJECTION, SOLUTION INTRAVENOUS CONTINUOUS
Status: DISCONTINUED | OUTPATIENT
Start: 2020-03-31 | End: 2020-04-03 | Stop reason: HOSPADM

## 2020-03-31 RX ORDER — IBUPROFEN 200 MG
1 TABLET ORAL DAILY
Status: DISCONTINUED | OUTPATIENT
Start: 2020-04-01 | End: 2020-04-03 | Stop reason: HOSPADM

## 2020-03-31 RX ORDER — DEXTROSE MONOHYDRATE 100 MG/ML
INJECTION, SOLUTION INTRAVENOUS
Status: DISPENSED
Start: 2020-03-31 | End: 2020-04-01

## 2020-03-31 RX ORDER — DEXTROSE MONOHYDRATE 100 MG/ML
0-250 INJECTION, SOLUTION INTRAVENOUS AS NEEDED
Status: DISCONTINUED | OUTPATIENT
Start: 2020-03-31 | End: 2020-04-03 | Stop reason: HOSPADM

## 2020-03-31 RX ORDER — MAGNESIUM SULFATE 100 %
4 CRYSTALS MISCELLANEOUS AS NEEDED
Status: DISCONTINUED | OUTPATIENT
Start: 2020-03-31 | End: 2020-04-03 | Stop reason: HOSPADM

## 2020-03-31 RX ORDER — HALOPERIDOL 5 MG/ML
2 INJECTION INTRAMUSCULAR
Status: DISCONTINUED | OUTPATIENT
Start: 2020-03-31 | End: 2020-04-03 | Stop reason: HOSPADM

## 2020-03-31 RX ADMIN — FAMOTIDINE 20 MG: 10 INJECTION, SOLUTION INTRAVENOUS at 21:02

## 2020-03-31 RX ADMIN — FAMOTIDINE 20 MG: 10 INJECTION, SOLUTION INTRAVENOUS at 00:18

## 2020-03-31 RX ADMIN — LABETALOL HYDROCHLORIDE 20 MG: 5 INJECTION INTRAVENOUS at 14:11

## 2020-03-31 RX ADMIN — Medication 10 ML: at 14:03

## 2020-03-31 RX ADMIN — SODIUM CHLORIDE, SODIUM LACTATE, POTASSIUM CHLORIDE, AND CALCIUM CHLORIDE 75 ML/HR: 600; 310; 30; 20 INJECTION, SOLUTION INTRAVENOUS at 00:18

## 2020-03-31 RX ADMIN — HALOPERIDOL LACTATE 2 MG: 5 INJECTION INTRAMUSCULAR at 02:25

## 2020-03-31 RX ADMIN — HALOPERIDOL LACTATE 2 MG: 5 INJECTION INTRAMUSCULAR at 07:13

## 2020-03-31 RX ADMIN — LABETALOL HYDROCHLORIDE 20 MG: 5 INJECTION INTRAVENOUS at 18:20

## 2020-03-31 RX ADMIN — SODIUM CHLORIDE, SODIUM LACTATE, POTASSIUM CHLORIDE, AND CALCIUM CHLORIDE 75 ML/HR: 600; 310; 30; 20 INJECTION, SOLUTION INTRAVENOUS at 07:26

## 2020-03-31 RX ADMIN — DEXTROSE MONOHYDRATE AND SODIUM CHLORIDE 50 ML/HR: 5; .45 INJECTION, SOLUTION INTRAVENOUS at 13:47

## 2020-03-31 RX ADMIN — Medication 10 ML: at 21:02

## 2020-03-31 RX ADMIN — Medication 10 ML: at 06:56

## 2020-03-31 RX ADMIN — DEXTROSE MONOHYDRATE 125 ML: 10 INJECTION, SOLUTION INTRAVENOUS at 17:57

## 2020-03-31 RX ADMIN — DEXTROSE MONOHYDRATE 125 ML: 10 INJECTION, SOLUTION INTRAVENOUS at 13:48

## 2020-03-31 NOTE — PROGRESS NOTES
Verbal shift change report given to MICKY Muniz and Charma Hodgkins (oncoming nurse) by Richard Leon (offgoing nurse). Report included the following information SBAR.

## 2020-03-31 NOTE — PROGRESS NOTES
[facility] Adult  Hospitalist Group     ICU Transfer/Accept Summary     This patient is being transferred OUT OF ICU  DATE OF TRANSFER: 3/31/2020       PATIENT ID: Rosenda Pittman  MRN: [id number]   YOB: 1925    PRIMARY CARE PROVIDER: Santiago Robb MD   DATE OF ADMISSION: 3/29/2020 10:23 PM    ATTENDING PHYSICIAN: Jil Hamlin NP  CONSULTATIONS:   IP CONSULT TO NEUROLOGY  IP CONSULT TO NEUROSURGERY    PROCEDURES/SURGERIES:   * No surgery found *    REASON FOR ADMISSION: ICH (intracerebral hemorrhage) [facility] PROBLEM LIST:  Patient Active Problem List   Diagnosis Code    Chest pain, unspecified R07.9    Acute respiratory failure (HCC) J96.00    Acute bronchitis J20.9    Elevated troponin R79.89    HTN (hypertension) I10    UTI (urinary tract infection) N39.0    ICH [facility]intracerebral hemorrhage) ([facility]) I61.9    COPD (chronic obstructive pulmonary disease) ([facility] [address] J44.9         Brief HPI and Hospital Course:      From H&P 3/29/2020:   \"Patient Alda Krishnan is a 26-year-old female seen and examined today by critical care services due to initial complaints of altered mental status. Patient has past medical history for bronchitis, hypertension only. According to emergency room physician patient had initial complaints of fatigue and her son called EMS due to patient's lack of energy. Patient was altered and did not know where she was at. Patient stated she did have a cough with abdominal pain and weakness associated. Patient initial temperature 100 °F.  No productive cough. Neurosurgery has been consulted for her acute intracranial hemorrhage with subdural hematoma extension. Plan is to continue neurochecks. Obtain respiratory viral panel to rule out any influenza. We will continue patient on Cardene drip for her life-threatening hypertensive emergency.   Plan is to follow neurosurgery and neurology recommendations for her life-threatening intercerebral hemorrhage. Repeat CT scan in a.m. Continue neurochecks. Continue ICU support. \"    NSGY Consult 3/29/2020:  \"IMPRESSION:  Right temporal hemorrhage. At this point, we will treat this conservatively. We will repeat the head CT in the morning. We will follow along with you. \"    Neuro Consult 3/30/2020:  \"Patient seen and examined. Chart and imaging reviewed. 41-year-old female right temporal intraparenchymal and subdural patient is currently not able to give historical details. I cannot get any clear history of trauma. Hemorrhoids feels as if it could be traumatic. No focal motor or sensory deficits. She is not. Coherent and thought processing is impaired. Continue with supportive care  Neurochecks every 2 hours  SBP goal less than 140  MRI brain\"    NSGY Progress Note 3/31/2020:  \"1. Right temporal ICH              - No surgical intervention              -- neuro checks q4h              - good BP control              - ok to go to [facility] from NSGY standpoint. Recommend bed close to nurse's station due to dementia              - PT/OT/Speech evals  2. Cerebral edema              - due to #1              - steroids not indicated              - plans as above  3. Dementia with altered mental status              - supportive care              - unsure of dementia baseline  4. HTN emergency              - Cardene PRN              - SBP<140              - Labetalol PRN\"    EEG 3/31/2020:  \"EEG SUMMARY:  Abnormal EEG due to mild slowing of the background rhythms, right more than left. CLINICAL INTERPRETATION:  This EEG is suggestive of mild generalized encephalopathic process, nonspecific in type. In addition, there is focal slowing seen in the right central temporal area which is likely related to the underlying hematoma. No epileptiform features were noted. No seizure was recorded. \"     Patient seen for ICU downgrade. Patient in bed no acute distress, nurse at bedside. Patient with Darby catheter. Patient not oriented, history of dementia. Patient continues to try to get out of bed despite reorientation. Unable to obtain review of systems, medical/surgical/family hx due to patient mentation. Discussed with Dr. Alejandrina Lainez. Assessment and Plan:  Right temporal ICH  -No surgical intervention  -q4h neuro checks  -goal SBP<140  -PT/OT/speech     Cerebral edema  -due to above  -steroids not indicated  -plans as above    Dementia  -Unclear baseline  -Palliative care consult    Dysphagia  -failed swallow  -MBS tomorrow when more awake  -D5 1/2 NS @ 50mL/hr while NPO    HTN Emergency  -off Cardene  -Goal SBP<140  -Pt unable to take PO; continue labetolol PRN    COPD: Chronic bronchitis  -Afeb, no leukocytosis  -No oxygen requirements  -Continue PRN nebs  -Nicotine patch (smoker)    Suspected UTI  -ruled out; UA negative      DVTppx: SCDs  Gippx: Famotidine  Diet: NPO; pt not following commands to eat  Code Status: Full  Activity: w/ assist; fall risk  Discharge: Pending         PHYSICAL EXAMINATION:  Visit Vitals  /53 (BP 1 Location: Right arm, BP Patient Position: At rest)   Pulse 71   Temp 98.2 °F (36.8 °C)   Resp 20   Wt 43.3 kg (95 lb 7.4 oz)   SpO2 100%   BMI 18.64 kg/m²        General:          Alert, cooperative, no distress, appears stated age, thing/frail  HEENT:           Atraumatic, MMM            Neck:               Supple, symmetrical,  thyroid: non tender  Lungs:             Clear to auscultation bilaterally. No Wheezing or Rhonchi. No rales. +barrell chest  Heart:              Regular  rhythm,  No  murmur   No edema  Abdomen:       Soft, non-tender. Not distended. Bowel sounds normal  Extremities:     No cyanosis. No clubbing,  +2 distal pulses  Skin:                Not pale. Not Jaundiced  No rashes   Psych:             Not anxious or agitated.   Neurologic:      Alert, moves all extremities, not oriented-hx dementia    CODE STATUS:    X Full Code    DNR    Partial    Comfort Care     Signed: Tal Fragoso NP  Date of Service:  3/31/2020  3:04 PM

## 2020-03-31 NOTE — PROGRESS NOTES
TRANSFER - OUT REPORT:    Verbal report given to Svetlana(name) on Coty Pittman  being transferred to [facility](unit) for routine progression of care       Report consisted of patients Situation, Background, Assessment and   Recommendations(SBAR). Information from the following report(s) SBAR was reviewed with the receiving nurse. Lines:   Peripheral IV 03/29/20 Right Antecubital (Active)   Site Assessment Clean, dry, & intact 3/31/2020 12:00 PM   Phlebitis Assessment 0 3/31/2020 12:00 PM   Infiltration Assessment 0 3/31/2020 12:00 PM   Dressing Status Clean, dry, & intact 3/31/2020 12:00 PM   Dressing Type Transparent;Tape 3/31/2020 12:00 PM   Hub Color/Line Status Pink;Capped 3/31/2020 12:00 PM   Action Taken Open ports on tubing capped 3/31/2020 12:00 PM   Alcohol Cap Used Yes 3/31/2020 12:00 PM       Peripheral IV 03/30/20 Anterior;Distal;Right Forearm (Active)   Site Assessment Clean, dry, & intact 3/31/2020 12:00 PM   Phlebitis Assessment 0 3/31/2020 12:00 PM   Infiltration Assessment 0 3/31/2020 12:00 PM   Dressing Status Clean, dry, & intact 3/31/2020 12:00 PM   Dressing Type Transparent;Tape 3/31/2020 12:00 PM   Hub Color/Line Status Blue; Infusing;Patent 3/31/2020 12:00 PM   Action Taken Open ports on tubing capped 3/31/2020 12:00 PM   Alcohol Cap Used Yes 3/31/2020 12:00 PM        Opportunity for questions and clarification was provided.       Patient transported with:   Monitor  Registered Nurse

## 2020-03-31 NOTE — CDMP QUERY
Query 2 Pt admitted with ICH and has encephalopathy documented. If possible, please document in progress notes and discharge summary further specificity regarding the type of encephalopathy: ? Hypertensive encephalopathy 
? Other encephalopathy, please specify ? Other, please specify ? Clinically unable to determine The medical record reflects the following: 
 
   Risk Factors: Dementia, ICH, cerebral edema, hypertensive emergency Clinical Indicators: - BPs at admission, 3/29 from 4613-5486: 
162/96  171/83 172/111 184/91 207/99  
- Neurosurgery PN 3/30: \"[Pt] also was altered and not sure where she was. She does have a history of dementia, but unsure what her actual baseline is. Her blood pressures were extremely elevated with SBPs in the 180-200 range. Her head CT revealed a right temporal hemorrhage. 1. Right temporal ICH 2. Cerebral edema 3. Dementia with altered mental status  
 - unsure of dementia baseline 4. HTN emergency\" - ICU PN 3/30: \"Acute on chronic encephalopathy (baseline dementia)\" Treatment:  
- Cardene titrated gtt - Labetalol IV PRN 
- Haldol IV PRN for agitation, delirium - Ramón Thank You, 
   Jase Alicea, [facility] 
   [phone]

## 2020-03-31 NOTE — PROGRESS NOTES
for initial visit. Pt was resting and did not awake when  called name. Please contact [phone] [facility] for further support.  follow up as needed.      [address] COLUMBA RoyDiv, MACE   [phone]

## 2020-03-31 NOTE — PROGRESS NOTES
Physical Therapy: Abort    Orders received, chart reviewed, pt cleared for PT eval by RN. Rn reports pt received haldol at 7am this morning. Pt received lying in bed. Did not respond to max multi modal cuing. Observed pt moving RUE and RLE volitionally but not on command (pt in L sidelying). She did not open eyes but responded \"ouch\" when therapist repositioned her legs in bed. Pt unable to actively participate in PT eval at this time. Session aborted, will follow up as able and appropriate.      Lissa Oropeza, PT, DPT

## 2020-03-31 NOTE — PROGRESS NOTES
Bedside and Verbal shift change report given to Kenna RN (oncoming nurse) by Ravinder Waller RN (offgoing nurse). Report included the following information SBAR, Kardex, Intake/Output, MAR, Recent Results, Cardiac Rhythm NSR and Dual Neuro Assessment.

## 2020-03-31 NOTE — PROCEDURES
[address]  
EEG Name:  Fransisco Ignacio 
MR#:  [id number] :  [id number] ACCOUNT #:  [de-identified] DATE OF SERVICE:  2020 REQUESTING PHYSICIAN:  Esperanza Bundy MD 
 
HISTORY:  The patient is a 71-year-old female who is being evaluated for fatigue and confusion. CT showed a right temporal hematoma. DESCRIPTION:  This is an 18-channel EEG performed on an awake, but restless patient. The dominant posterior background rhythm consists of medium voltage rhythms in the 7-8 Hz frequency range out of the left posterior head region. There is overall slowing seen on the right when compared to the left. There is theta slowing seen in the right central temporal region. Drowsiness and sleep architecture was not noted. Photic stimulation and hyperventilation were not performed. EEG SUMMARY:  Abnormal EEG due to mild slowing of the background rhythms, right more than left. CLINICAL INTERPRETATION:  This EEG is suggestive of mild generalized encephalopathic process, nonspecific in type. In addition, there is focal slowing seen in the right central temporal area which is likely related to the underlying hematoma. No epileptiform features were noted. No seizure was recorded. Awais Nelson MD 
 
 
[id number] 
D:  2020 10:59 T:  2020 11:11 
JOB #:  [id number]

## 2020-03-31 NOTE — PROGRESS NOTES
HYPOGLYCEMIC EPISODE DOCUMENTATION    Patient with hypoglycemic episode(s) at 1757(time) on 3/31(date). BG value(s) pre-treatment 58    Was patient symptomatic?  [] yes, [x] no  Patient was treated with the following rescue medications/treatments: [x] D10%                [] Glucose tablets                [] Glucagon                [] 4oz juice                [] 6oz reg soda                [] 8oz low fat milk  BG value post-treatment: 140  Once BG treated and value greater than 80mg/dl, pt was provided with the following:  Pt is NPO and receiving IVF containing dextrose

## 2020-03-31 NOTE — PROGRESS NOTES
Problem: Dysphagia (Adult)  Goal: *Acute Goals and Plan of Care (Insert Text)  Description: Speech therapy goals  Initiated 3/31/2020   1. Patient will participate in re-evaluation of swallow function within 7 days      Outcome: Progressing Towards Goal     [address] EVALUATION  Patient: Regi Rios (76 y.o. female)  Date: 3/31/2020  Primary Diagnosis: ICH (intracerebral hemorrhage) ([facility] [address] [I61.9]        Precautions: aspiration       ASSESSMENT :  Based on the objective data described below, the patient presents with severe oral dysphagia with no response to ice chips rubbed on lips despite max verbal and tactile cues. Patient with no attempt to wipe melted water from ice from her lips/chin. She did have periods of alertness with eyes open and verbalizations that were out of context. Her mental status is a significant limiting factor in safe PO intake and suspect dysphagia may have been present PTA as well. Patient will benefit from skilled intervention to address the above impairments. Patients rehabilitation potential is considered to be Guarded     PLAN :  Recommendations and Planned Interventions:  --recommend continued NPO with consideration of Palliative Care consult in light of baseline dementia, overall medical status, and age. --will follow for re-assessment of swallow function as mental status allows   Frequency/Duration: Patient will be followed by speech-language pathology 3 times a week to address goals. Discharge Recommendations: To Be Determined     SUBJECTIVE:   Patient stated what?!. When moving her around in bed     OBJECTIVE:     Past Medical History:   Diagnosis Date    Hypertension     Other ill-defined conditions(799.89)     2 MI's last one in 2007   No past surgical history on file.   Prior Level of Function/Home Situation:      Diet prior to admission: unknown  Current Diet:  NPO    Cognitive and Communication Status:  Neurologic State: Alert, Confused, Drowsy(brief periods of alertness )  Orientation Level: Unable to verbalize  Cognition: No command following           Oral Assessment:  Oral Assessment  Labial: (did not follow commands to assess )  P.O. Trials:  Patient Position: upright in bed   Vocal quality prior to P.O.: No impairment  Consistency Presented: Ice chips  How Presented: SLP-fed/presented;Spoon     Bolus Acceptance: Absent(no response to ice rubbed on lips )                                     Oral Phase Severity: Severe  Pharyngeal Phase Severity : (unable to assess due to oral phase deficits )    NOMS:   The NOMS functional outcome measure was used to quantify this patient's level of swallowing impairment. Based on the NOMS, the patient was determined to be at level 1 for swallow function       NOMS Swallowing Levels:  Level 1 (CN): NPO  Level 2 (CM): NPO but takes consistency in therapy  Level 3 (CL): Takes less than 50% of nutrition p.o. and continues with nonoral feedings; and/or safe with mod cues; and/or max diet restriction  Level 4 (CK): Safe swallow but needs mod cues; and/or mod diet restriction; and/or still requires some nonoral feeding/supplements  Level 5 (CJ): Safe swallow with min diet restriction; and/or needs min cues  Level 6 (CI): Independent with p.o.; rare cues; usually self cues; may need to avoid some foods or needs extra time  Level 7 ([address] [place] [address]): Independent for all p.o.  LJ. (2003). National Outcomes Measurement System (NOMS): Adult Speech-Language Pathology User's Guide. Pain:  Pain Scale 1: Adult Nonverbal Pain Scale  Pain Intensity 1: 0       After treatment:   Patient left in no apparent distress in bed, Call bell within reach, and Nursing notified    COMMUNICATION/EDUCATION:   Patient was educated regarding her deficit(s) of dysphagia as this relates to her diagnosis of ICH. She demonstrated Poor  understanding as evidenced by no response to education.     The patient's plan of care including recommendations, planned interventions, and recommended diet changes were discussed with: Physical therapist, Occupational therapist, and Registered nurse. Patient is unable to participate in goal setting and plan of care. Thank you for this referral.  Yosi Walker M.CD.  CCC-SLP   Time Calculation: 20 mins

## 2020-03-31 NOTE — CDMP QUERY
Query 1 Pt admitted with ICH and has dementia with AMS and acute on chronic encephalopathy documented. If possible, please document in the progress notes and discharge summary if you are evaluating and / or treating any of the following: ? Dementia with behavioral disturbance ? AMS d/t encephalopathy with underlying dementia 
? Other, please specify ? Clinically unable to determine The medical record reflects the following: 
 
   Risk Factors: Dementia, ICH, cerebral edema Clinical Indicators:  
- Neurosurgery PN 3/30: \"The patient's son called EMS due to her fatigue and lack of energy. She also was altered and not sure where she was. She does have a history of dementia, but unsure what her actual baseline is. \" 
- RN PN 3/30: 
\"8022 - attempted to straight cath, pt was fighting and refusing to allow cath. ... 8448 - chávez placement was successful with 4 people helping to hold. Chávez drained 550ml. Pt states she was feeling much better and is acting less agitated Sitter remains in the room d/t AMS . .. 1330 - EEG at bedside, pt extremely agitated, swatting, kicking, biting, verbally abusive. Stayed at bed side with sitter to hold pt and allow EEG tech to complete the test.\" 
Neurology CN: \"Pt with waxing and waning mental status\" - ICU PN 3/30: \"Acute on chronic encephalopathy (baseline dementia)\" Treatment:  
- Haldol IV PRN for agitation, delirium - Lorazepam IV x2 for MRI 
- Sitter Thank You, 
   Jv Carroll, [facility] 
   [phone]

## 2020-03-31 NOTE — PROGRESS NOTES
Neurosurgery Progress Note  Kayleigh Lopez, [facility]P-BC  [phone]        Admit Date: 3/29/2020   LOS: 2 days        Daily Progress Note: 3/31/2020    HPI: The patient's son called EMS due to her fatigue and lack of energy. She also was altered and not sure where she was. She does have a history of dementia, but unsure what her actual baseline is. Her blood pressures were extremely elevated with SBPs in the 180-200 range. Her head CT revealed a right temporal hemorrhage. She was transferred to [facility] ICU. Subjective:   No acute events overnight. She is off Cardene. Her MRI yesterday did reveal a small infarct in the right cerebellum. Unable to obtain ROS due to AMS. Objective:     Vital signs  Temp (24hrs), Av.7 °F (36.5 °C), Min:96.5 °F (35.8 °C), Max:98.8 °F (37.1 °C)    07 -  1900  In: 375 [I.V.:375]  Out: 595 [Urine:595]  1901 -  0700  In: 3315.8 [I.V.:3315.8]  Out: 8840 [Urine:2475]    Visit Vitals  /69   Pulse 66   Temp 98 °F (36.7 °C)   Resp 22   Wt 43.3 kg (95 lb 7.4 oz)   SpO2 97%   BMI 18.64 kg/m²    O2 Flow Rate (L/min): 2 l/min O2 Device: Room air     Pain control  Pain Assessment  Pain Scale 1: Adult Nonverbal Pain Scale  Pain Intensity 1: 0    PT/OT  Gait                 Physical Exam:  Gen:NAD. Neuro: Resting but opens eyes to voice. Fidgety. Oriented x 1. Does not know year or where she is. Intermittently following commands. Not easily redirectable. Speech clear. Affect flat. Pupils pinpoint. Face symmetric. WOMACK spontaneously but not to command. She pulls away from pain and says \"ow! Don't pinch me! \"  Gait deferred. CT head without contrast on 2020 shows acute right temporal hemorrhage with associated subdural hematoma. This is most suggestive of angiopathy, subacute in nature with the surrounding and number. No significant mass effect at this time.      CT head without contrast on 2020 shows slight increase in size of right temporal bilobed intraparenchymal hemorrhage but without significant midline shift. Associated subdural hematoma and subarachnoid blood as described in the body of the radiology report. CT head without contrast on 03/31/2020 shows stable head CT with known intraparenchymal, subdural, and subarachnoid hemorrhage.       24 hour results:    Recent Results (from the past 24 hour(s))   TROPONIN I    Collection Time: 03/30/20  7:09 PM   Result Value Ref Range    Troponin-I, Qt. 0.15 (H) <4.95 ng/mL   METABOLIC PANEL, BASIC    Collection Time: 03/31/20  5:46 AM   Result Value Ref Range    Sodium 137 136 - 145 mmol/L    Potassium 3.8 3.5 - 5.1 mmol/L    Chloride 101 97 - 108 mmol/L    CO2 28 21 - 32 mmol/L    Anion gap 8 5 - 15 mmol/L    Glucose 75 65 - 100 mg/dL    BUN 10 6 - 20 MG/DL    Creatinine 0.99 0.55 - 1.02 MG/DL    BUN/Creatinine ratio 10 (L) 12 - 20      GFR est AA >60 >60 ml/min/1.73m2    GFR est non-AA 52 (L) >60 ml/min/1.73m2    Calcium 9.1 8.5 - 10.1 MG/DL   MAGNESIUM    Collection Time: 03/31/20  5:46 AM   Result Value Ref Range    Magnesium 1.5 (L) 1.6 - 2.4 mg/dL   PHOSPHORUS    Collection Time: 03/31/20  5:46 AM   Result Value Ref Range    Phosphorus 2.4 (L) 2.6 - 4.7 MG/DL   CBC WITH AUTOMATED DIFF    Collection Time: 03/31/20  5:46 AM   Result Value Ref Range    WBC 6.1 3.6 - 11.0 K/uL    RBC 5.36 (H) 3.80 - 5.20 M/uL    HGB 11.8 11.5 - 16.0 g/dL    HCT 38.4 35.0 - 47.0 %    MCV 71.6 (L) 80.0 - 99.0 FL    MCH 22.0 (L) 26.0 - 34.0 PG    MCHC 30.7 30.0 - 36.5 g/dL    RDW 15.8 (H) 11.5 - 14.5 %    PLATELET 579 (L) 900 - 400 K/uL    NRBC 0.0 0  WBC    ABSOLUTE NRBC 0.00 0.00 - 0.01 K/uL    NEUTROPHILS 51 32 - 75 %    LYMPHOCYTES 19 12 - 49 %    MONOCYTES 7 5 - 13 %    EOSINOPHILS 22 (H) 0 - 7 %    BASOPHILS 1 0 - 1 %    IMMATURE GRANULOCYTES 0 0.0 - 0.5 %    ABS. NEUTROPHILS 3.1 1.8 - 8.0 K/UL    ABS. LYMPHOCYTES 1.2 0.8 - 3.5 K/UL    ABS. MONOCYTES 0.4 0.0 - 1.0 K/UL    ABS.  EOSINOPHILS 1.3 (H) 0.0 - 0.4 K/UL    ABS. BASOPHILS 0.1 0.0 - 0.1 K/UL    ABS. IMM. GRANS. 0.0 0.00 - 0.04 K/UL    DF SMEAR SCANNED      PLATELET COMMENTS Large Platelets      RBC COMMENTS ANISOCYTOSIS  1+        RBC COMMENTS OVALOCYTES  PRESENT       PROTHROMBIN TIME + INR    Collection Time: 03/31/20  5:46 AM   Result Value Ref Range    INR 1.0 0.9 - 1.1      Prothrombin time 10.5 9.0 - 11.1 sec   GLUCOSE, POC    Collection Time: 03/31/20  1:34 PM   Result Value Ref Range    Glucose (POC) 66 65 - 100 mg/dL    Performed by Esteban Xavier           Assessment:     Principal Problem:    ICH (intracerebral hemorrhage) ([facility] [address] (3/29/2020)        Plan:   1. Right temporal ICH   - No surgical intervention   -- neuro checks q4h   - good BP control   - ok to go to NSTU from NSGY standpoint. Recommend bed close to nurse's station due to dementia   - PT/OT/Speech evals  2. Cerebral edema   - due to #1   - steroids not indicated   - plans as above  3. Dementia with altered mental status   - supportive care   - unsure of dementia baseline  4. HTN emergency   - Cardene PRN   - SBP<140   - Labetalol PRN    Activity: up with assist  DVT ppx: SCDs  Dispo: tbd    Plan d/w Dr. Michelle Lindquist, ICU nurse. No neurosurgical intervention necessary. Will sign off. Call if questions.        Chyna Manrique NP

## 2020-03-31 NOTE — PROGRESS NOTES
Occupational Therapy    Orders received, chart reviewed, pt cleared for OT eval by RN. Rn reports pt received haldol at 7am this morning. Pt received lying in bed. Did not respond to max multi modal cuing. Observed pt moving RUE and RLE volitionally but not on command (pt in L sidelying). She did not open eyes but responded \"ouch\" when therapist repositioned her legs in bed. Pt unable to actively participate in OT eval at this time. Session aborted, will follow up as able and appropriate.      Onesimo Oscar, OTR/L

## 2020-03-31 NOTE — PROGRESS NOTES
Primary Nurse Leticia Ordonez RN and Justin Victor., RN performed a dual skin assessment on this patient No impairment noted  Isidro score is 16 Detail Level: Detailed Introduction Text (Please End With A Colon): The following procedure was deferred: Procedure To Be Performed At Next Visit: Excision

## 2020-03-31 NOTE — PROGRESS NOTES
TRANSFER - IN REPORT:    Verbal report received from [facility] RN(name) on Illona Prey  being received from ICU(unit) for routine progression of care      Report consisted of patients Situation, Background, Assessment and   Recommendations(SBAR). Information from the following report(s) SBAR, Kardex, Intake/Output, MAR, Accordion, Recent Results, Cardiac Rhythm NSR and Dual Neuro Assessment was reviewed with the receiving nurse. Opportunity for questions and clarification was provided. Assessment completed upon patients arrival to unit and care assumed.

## 2020-03-31 NOTE — PROGRESS NOTES
Patient transferred from ICU. Receiving RN questioned outstanding orders for FLU and RESP panel. Transferring Rn said she spoke with MD and felt orders not necessary. PT DX with CVA. Pt transferred to NSTU placed on droplet precautions. Spoke with Minh Montana NP. Per NP pt is a long time smoker with chronic cough, CXR is clear and patient had a temp of 100 on admission but none since 3.29.20. Droplet precautions removed. No additional tests ordered at this time.

## 2020-03-31 NOTE — PROGRESS NOTES
Problem: Falls - Risk of  Goal: *Absence of Falls  Description: Document Alix Donohue Fall Risk and appropriate interventions in the flowsheet.   Outcome: Progressing Towards Goal  Note: Fall Risk Interventions:  Mobility Interventions: Bed/chair exit alarm, Patient to call before getting OOB    Mentation Interventions: Bed/chair exit alarm, Door open when patient unattended    Medication Interventions: Bed/chair exit alarm, Patient to call before getting OOB    Elimination Interventions: Bed/chair exit alarm, Call light in reach, Toileting schedule/hourly rounds    History of Falls Interventions: Bed/chair exit alarm, Room close to nurse's station         Problem: Hemorrhagic Stroke: Day 3  Goal: Activity/Safety  Outcome: Progressing Towards Goal  Goal: Consults, if ordered  Outcome: Progressing Towards Goal  Goal: Diagnostic Test/Procedures  Outcome: Progressing Towards Goal  Goal: Nutrition/Diet  Outcome: Progressing Towards Goal  Goal: Psychosocial  Outcome: Progressing Towards Goal  Goal: *Absence of aspiration  Outcome: Progressing Towards Goal  Goal: *Absence of signs and symptoms of DVT  Outcome: Progressing Towards Goal  Goal: *Tolerating diet  Outcome: Progressing Towards Goal  Goal: *Progressive mobility and function  Outcome: Progressing Towards Goal  Goal: *Rehabilitation readiness  Outcome: Progressing Towards Goal

## 2020-04-01 LAB
ANION GAP SERPL CALC-SCNC: 9 MMOL/L (ref 5–15)
BASOPHILS # BLD: 0.1 K/UL (ref 0–0.1)
BASOPHILS NFR BLD: 1 % (ref 0–1)
BUN SERPL-MCNC: 10 MG/DL (ref 6–20)
BUN/CREAT SERPL: 10 (ref 12–20)
CALCIUM SERPL-MCNC: 8.8 MG/DL (ref 8.5–10.1)
CHLORIDE SERPL-SCNC: 102 MMOL/L (ref 97–108)
CO2 SERPL-SCNC: 23 MMOL/L (ref 21–32)
CREAT SERPL-MCNC: 1.01 MG/DL (ref 0.55–1.02)
DIFFERENTIAL METHOD BLD: ABNORMAL
EOSINOPHIL # BLD: 1.1 K/UL (ref 0–0.4)
EOSINOPHIL NFR BLD: 19 % (ref 0–7)
ERYTHROCYTE [DISTWIDTH] IN BLOOD BY AUTOMATED COUNT: 15.3 % (ref 11.5–14.5)
GLUCOSE BLD STRIP.AUTO-MCNC: 101 MG/DL (ref 65–100)
GLUCOSE BLD STRIP.AUTO-MCNC: 111 MG/DL (ref 65–100)
GLUCOSE BLD STRIP.AUTO-MCNC: 112 MG/DL (ref 65–100)
GLUCOSE BLD STRIP.AUTO-MCNC: 152 MG/DL (ref 65–100)
GLUCOSE BLD STRIP.AUTO-MCNC: 58 MG/DL (ref 65–100)
GLUCOSE BLD STRIP.AUTO-MCNC: 86 MG/DL (ref 65–100)
GLUCOSE SERPL-MCNC: 95 MG/DL (ref 65–100)
HCT VFR BLD AUTO: 36.2 % (ref 35–47)
HGB BLD-MCNC: 11.1 G/DL (ref 11.5–16)
IMM GRANULOCYTES # BLD AUTO: 0 K/UL (ref 0–0.04)
IMM GRANULOCYTES NFR BLD AUTO: 0 % (ref 0–0.5)
INR PPP: 1 (ref 0.9–1.1)
LEFT CCA DIST DIAS: 5.2 CM/S
LEFT CCA DIST SYS: 45.8 CM/S
LEFT CCA PROX DIAS: 0 CM/S
LEFT CCA PROX SYS: 46.1 CM/S
LEFT ECA DIAS: 0 CM/S
LEFT ECA SYS: 82.9 CM/S
LEFT ICA MID DIAS: 8 CM/S
LEFT ICA MID SYS: 46.5 CM/S
LEFT ICA PROX DIAS: 5.2 CM/S
LEFT ICA PROX SYS: 48.6 CM/S
LEFT ICA/CCA SYS: 1.06
LEFT VERTEBRAL DIAS: 11.81 CM/S
LEFT VERTEBRAL SYS: 60 CM/S
LYMPHOCYTES # BLD: 1.5 K/UL (ref 0.8–3.5)
LYMPHOCYTES NFR BLD: 26 % (ref 12–49)
MAGNESIUM SERPL-MCNC: 1.4 MG/DL (ref 1.6–2.4)
MCH RBC QN AUTO: 21.9 PG (ref 26–34)
MCHC RBC AUTO-ENTMCNC: 30.7 G/DL (ref 30–36.5)
MCV RBC AUTO: 71.3 FL (ref 80–99)
MONOCYTES # BLD: 0.6 K/UL (ref 0–1)
MONOCYTES NFR BLD: 10 % (ref 5–13)
NEUTS SEG # BLD: 2.6 K/UL (ref 1.8–8)
NEUTS SEG NFR BLD: 44 % (ref 32–75)
NRBC # BLD: 0 K/UL (ref 0–0.01)
NRBC BLD-RTO: 0 PER 100 WBC
PHOSPHATE SERPL-MCNC: 2.4 MG/DL (ref 2.6–4.7)
PLATELET # BLD AUTO: 154 K/UL (ref 150–400)
PLATELET COMMENTS,PCOM: ABNORMAL
POTASSIUM SERPL-SCNC: 3.2 MMOL/L (ref 3.5–5.1)
PROTHROMBIN TIME: 10.5 SEC (ref 9–11.1)
RBC # BLD AUTO: 5.08 M/UL (ref 3.8–5.2)
RBC MORPH BLD: ABNORMAL
RIGHT CCA DIST DIAS: 6.7 CM/S
RIGHT CCA DIST SYS: 36.9 CM/S
RIGHT CCA PROX DIAS: 8.8 CM/S
RIGHT CCA PROX SYS: 45.8 CM/S
RIGHT ECA DIAS: 0 CM/S
RIGHT ECA SYS: 125.3 CM/S
RIGHT ICA DIST DIAS: 9.3 CM/S
RIGHT ICA DIST SYS: 42.8 CM/S
RIGHT ICA MID DIAS: 9.5 CM/S
RIGHT ICA MID SYS: 34.4 CM/S
RIGHT ICA PROX DIAS: 4.6 CM/S
RIGHT ICA PROX SYS: 38.6 CM/S
RIGHT ICA/CCA SYS: 1.2
RIGHT VERTEBRAL DIAS: 9.48 CM/S
RIGHT VERTEBRAL SYS: 53.9 CM/S
SERVICE CMNT-IMP: ABNORMAL
SERVICE CMNT-IMP: NORMAL
SODIUM SERPL-SCNC: 134 MMOL/L (ref 136–145)
WBC # BLD AUTO: 5.9 K/UL (ref 3.6–11)

## 2020-04-01 PROCEDURE — 36415 COLL VENOUS BLD VENIPUNCTURE: CPT

## 2020-04-01 PROCEDURE — 74011000250 HC RX REV CODE- 250: Performed by: NURSE PRACTITIONER

## 2020-04-01 PROCEDURE — 85025 COMPLETE CBC W/AUTO DIFF WBC: CPT

## 2020-04-01 PROCEDURE — 74011000258 HC RX REV CODE- 258: Performed by: NURSE PRACTITIONER

## 2020-04-01 PROCEDURE — 51798 US URINE CAPACITY MEASURE: CPT

## 2020-04-01 PROCEDURE — 97535 SELF CARE MNGMENT TRAINING: CPT

## 2020-04-01 PROCEDURE — 82962 GLUCOSE BLOOD TEST: CPT

## 2020-04-01 PROCEDURE — 74011250636 HC RX REV CODE- 250/636: Performed by: INTERNAL MEDICINE

## 2020-04-01 PROCEDURE — 84100 ASSAY OF PHOSPHORUS: CPT

## 2020-04-01 PROCEDURE — 83735 ASSAY OF MAGNESIUM: CPT

## 2020-04-01 PROCEDURE — 97116 GAIT TRAINING THERAPY: CPT | Performed by: PHYSICAL THERAPIST

## 2020-04-01 PROCEDURE — 97161 PT EVAL LOW COMPLEX 20 MIN: CPT | Performed by: PHYSICAL THERAPIST

## 2020-04-01 PROCEDURE — 74011250636 HC RX REV CODE- 250/636: Performed by: NURSE PRACTITIONER

## 2020-04-01 PROCEDURE — 92526 ORAL FUNCTION THERAPY: CPT | Performed by: SPEECH-LANGUAGE PATHOLOGIST

## 2020-04-01 PROCEDURE — 85610 PROTHROMBIN TIME: CPT

## 2020-04-01 PROCEDURE — 74011250637 HC RX REV CODE- 250/637: Performed by: NURSE PRACTITIONER

## 2020-04-01 PROCEDURE — 97165 OT EVAL LOW COMPLEX 30 MIN: CPT

## 2020-04-01 PROCEDURE — 65660000000 HC RM CCU STEPDOWN

## 2020-04-01 PROCEDURE — 80048 BASIC METABOLIC PNL TOTAL CA: CPT

## 2020-04-01 PROCEDURE — 74011250637 HC RX REV CODE- 250/637: Performed by: INTERNAL MEDICINE

## 2020-04-01 RX ORDER — CLONIDINE 0.1 MG/24H
1 PATCH, EXTENDED RELEASE TRANSDERMAL
Status: DISCONTINUED | OUTPATIENT
Start: 2020-04-01 | End: 2020-04-03 | Stop reason: HOSPADM

## 2020-04-01 RX ORDER — POTASSIUM CHLORIDE 750 MG/1
40 TABLET, FILM COATED, EXTENDED RELEASE ORAL
Status: DISCONTINUED | OUTPATIENT
Start: 2020-04-01 | End: 2020-04-01

## 2020-04-01 RX ORDER — POTASSIUM CHLORIDE 7.45 MG/ML
10 INJECTION INTRAVENOUS
Status: COMPLETED | OUTPATIENT
Start: 2020-04-01 | End: 2020-04-01

## 2020-04-01 RX ORDER — LABETALOL HYDROCHLORIDE 5 MG/ML
20 INJECTION, SOLUTION INTRAVENOUS
Status: DISCONTINUED | OUTPATIENT
Start: 2020-04-01 | End: 2020-04-03 | Stop reason: HOSPADM

## 2020-04-01 RX ADMIN — POTASSIUM CHLORIDE 10 MEQ: 10 INJECTION, SOLUTION INTRAVENOUS at 11:23

## 2020-04-01 RX ADMIN — DEXTROSE MONOHYDRATE AND SODIUM CHLORIDE 50 ML/HR: 5; .45 INJECTION, SOLUTION INTRAVENOUS at 13:35

## 2020-04-01 RX ADMIN — DEXTROSE MONOHYDRATE 250 ML: 10 INJECTION, SOLUTION INTRAVENOUS at 12:23

## 2020-04-01 RX ADMIN — POTASSIUM CHLORIDE 10 MEQ: 10 INJECTION, SOLUTION INTRAVENOUS at 12:25

## 2020-04-01 RX ADMIN — FAMOTIDINE 20 MG: 10 INJECTION, SOLUTION INTRAVENOUS at 23:56

## 2020-04-01 RX ADMIN — Medication 10 ML: at 13:24

## 2020-04-01 RX ADMIN — POTASSIUM CHLORIDE 10 MEQ: 10 INJECTION, SOLUTION INTRAVENOUS at 09:42

## 2020-04-01 RX ADMIN — POTASSIUM CHLORIDE 10 MEQ: 10 INJECTION, SOLUTION INTRAVENOUS at 13:34

## 2020-04-01 RX ADMIN — LABETALOL HYDROCHLORIDE 20 MG: 5 INJECTION INTRAVENOUS at 17:40

## 2020-04-01 RX ADMIN — ALBUTEROL SULFATE 2.5 MG: 2.5 SOLUTION RESPIRATORY (INHALATION) at 02:10

## 2020-04-01 RX ADMIN — Medication 10 ML: at 06:16

## 2020-04-01 RX ADMIN — LABETALOL HYDROCHLORIDE 20 MG: 5 INJECTION INTRAVENOUS at 05:29

## 2020-04-01 RX ADMIN — Medication 10 ML: at 22:00

## 2020-04-01 NOTE — CONSULTS
Palliative Medicine Consult  Kieran: [phone])    Patient Name: Rosenda Pittman  YOB: 1925    Date of Initial Consult: April 1, 2020  Reason for Consult: Care Decisions  Requesting Provider: Neeraj Mckoy NP  Primary Care Physician: Santiago Robb MD     SUMMARY:   Rosenda Pittman is a 80 y.o. female admitted on 3/29/2020 from home with a diagnosis of   Chest pain, ICH, cerebral edema, acute respiratory failure, acute bronchitis, elevated troponin, hypertensive emergency, AMS, ? UTI,  Hypokalemia. Seen by Neurosurgery and there are no plans for surgery. ICU initially but now on neuro unit. PMH:COPD, dementia, HTN, dysphagia    Current medical issues leading to Palliative Medicine involvement include: support with care decisions. 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 Spiritual: Jehovah's witness  Baseline: independent prior to admission, uses walker/cane to ambulate    Full code     PALLIATIVE DIAGNOSES:   1. Shortness of breath  2. Dysphagia  3. Acute stroke  4. Physical debility      PLAN:   1. Pt unable to provide any information due to medical condition  2. Will call family to discuss care goals  3. Initial consult note routed to primary continuity provider and/or primary health care team members  4.  Communicated plan of care with: Palliative [facility] Team     GOALS OF CARE / TREATMENT PREFERENCES:     GOALS OF CARE:  Patient/Health Care Proxy Stated Goals: Prolong life    TREATMENT PREFERENCES:   Code Status: Full Code    Advance Care Planning:  [x] [facility] Interdisciplinary Team has updated the ACP Navigator with Health Care Decision Maker and Patient Capacity      Primary Decision Maker: Carol Godfrey - Son - [phone]    Primary Decision Maker: Rand Hayes Daughter - [phone]  Advance Care Planning 4/1/2020   Patient's Healthcare Decision Maker is: Legal Next of Kin       Medical Interventions: Full interventions     Other Instructions:   Artificially Administered Nutrition: No feeding tube     Other:    As far as possible, the palliative care team has discussed with patient / health care proxy about goals of care / treatment preferences for patient. HISTORY:     History obtained from: chart    CHIEF COMPLAINT: pt admitted with aforementioned history and issues    HPI/SUBJECTIVE:    The patient is:   [] Verbal and participatory  [x] Non-participatory due to:   Medical condition     Clinical Pain Assessment (nonverbal scale for severity on nonverbal patients):   Clinical Pain Assessment  Severity: 0     Activity (Movement): Lying quietly, normal position    Duration: for how long has pt been experiencing pain (e.g., 2 days, 1 month, years)  Frequency: how often pain is an issue (e.g., several times per day, once every few days, constant)     FUNCTIONAL ASSESSMENT:     Palliative Performance Scale (PPS):  PPS: 30       PSYCHOSOCIAL/SPIRITUAL SCREENING:     Palliative IDT has assessed this patient for cultural preferences / practices and a referral made as appropriate to needs (Cultural Services, Patient Advocacy, Ethics, etc.)    Any spiritual / Christian concerns:  [] Yes /  [] No    Caregiver Burnout:  [] Yes /  [] No /  [x] No Caregiver Present      Anticipatory grief assessment:   [] Normal  / [] Maladaptive       ESAS Anxiety: Anxiety: 0    ESAS Depression:          REVIEW OF SYSTEMS:     Positive and pertinent negative findings in ROS are noted above in HPI. The following systems were [x] reviewed objectively / [] unable to be reviewed as noted in HPI  Other findings are noted below.   Systems: constitutional, ears/nose/mouth/throat, respiratory, gastrointestinal, genitourinary, musculoskeletal, integumentary, neurologic, psychiatric, endocrine. Positive findings noted below. Modified ESAS Completed by: provider   Fatigue: 5 Drowsiness: 3     Pain: 0   Anxiety: 0 Nausea: 0   Anorexia: 0 Dyspnea: 0           Stool Occurrence(s): 1        PHYSICAL EXAM:     From RN flowsheet:  Wt Readings from Last 3 Encounters:   04/01/20 100 lb 12 oz (45.7 kg)   03/29/20 87 lb 11.9 oz (39.8 kg)   08/26/18 113 lb (51.3 kg)     Blood pressure 168/60, pulse 84, temperature 97.9 °F (36.6 °C), resp. rate 18, weight 100 lb 12 oz (45.7 kg), SpO2 100 %. Pain Scale 1: Numeric (0 - 10)  Pain Intensity 1: 0                 Last bowel movement, if known:     Constitutional: eating, nonverbal, nad  Eyes: pupils equal, anicteric  ENMT: no nasal discharge, moist mucous membranes  Cardiovascular: regular rhythm, distal pulses intact  Respiratory: breathing not labored, symmetric  Gastrointestinal: soft non-tender, +bowel sounds  Musculoskeletal: no deformity, no tenderness to palpation  Skin: warm, dry  Neurologic: following no commands, stroke  Psychiatric:        HISTORY:     Principal Problem:    ICH (intracerebral hemorrhage) ([facility] (3/29/2020)    Active Problems:    Acute bronchitis (12/27/2014)      HTN (hypertension) (12/27/2014)      COPD (chronic obstructive pulmonary disease) ([facility] (3/31/2020)      Dementia (HCC) ()      Past Medical History:   Diagnosis Date    COPD (chronic obstructive pulmonary disease) ([facility] 3/31/2020    Dementia ([facility]     Hypertension     Other ill-defined conditions(799.89)     2 MI's last one in 2007      History reviewed. No pertinent surgical history. History reviewed. No pertinent family history. History reviewed, no pertinent family history.   Social History     Tobacco Use    Smoking status: Current Every Day Smoker    Smokeless tobacco: Never Used   Substance Use Topics    Alcohol use: Yes     No Known Allergies   Current Facility-Administered Medications   Medication Dose Route Frequency    cloNIDine (CATAPRES) 0.1 mg/24 hr patch 1 Patch  1 Patch TransDERmal Q7D    labetaloL (NORMODYNE;TRANDATE) injection 20 mg  20 mg IntraVENous Q2H PRN    haloperidol lactate (HALDOL) injection 2 mg  2 mg IntraVENous Q4H PRN    dextrose 5 % - 0.45% NaCl infusion  50 mL/hr IntraVENous CONTINUOUS    glucose chewable tablet 16 g  4 Tab Oral PRN    glucagon (GLUCAGEN) injection 1 mg  1 mg IntraMUSCular PRN    dextrose 10% infusion 0-250 mL  0-250 mL IntraVENous PRN    nicotine (NICODERM CQ) 14 mg/24 hr patch 1 Patch  1 Patch TransDERmal DAILY    sodium chloride (NS) flush 5-40 mL  5-40 mL IntraVENous Q8H    sodium chloride (NS) flush 5-40 mL  5-40 mL IntraVENous PRN    acetaminophen (TYLENOL) tablet 650 mg  650 mg Oral Q4H PRN    ondansetron (ZOFRAN) injection 4 mg  4 mg IntraVENous Q4H PRN    docusate sodium (COLACE) capsule 100 mg  100 mg Oral BID    famotidine (PF) (PEPCID) 20 mg in 0.9% sodium chloride 10 mL injection  20 mg IntraVENous Q24H    albuterol (PROVENTIL VENTOLIN) nebulizer solution 2.5 mg  2.5 mg Nebulization Q4H PRN          LAB AND IMAGING FINDINGS:     Lab Results   Component Value Date/Time    WBC 5.9 04/01/2020 01:19 AM    HGB 11.1 (L) 04/01/2020 01:19 AM    PLATELET 342 89/45/1463 01:19 AM     Lab Results   Component Value Date/Time    Sodium 134 (L) 04/01/2020 01:19 AM    Potassium 3.2 (L) 04/01/2020 01:19 AM    Chloride 102 04/01/2020 01:19 AM    CO2 23 04/01/2020 01:19 AM    BUN 10 04/01/2020 01:19 AM    Creatinine 1.01 04/01/2020 01:19 AM    Calcium 8.8 04/01/2020 01:19 AM    Magnesium 1.4 (L) 04/01/2020 01:19 AM    Phosphorus 2.4 (L) 04/01/2020 01:19 AM      Lab Results   Component Value Date/Time    AST (SGOT) 17 03/30/2020 02:49 AM    Alk.  phosphatase 151 (H) 03/30/2020 02:49 AM    Protein, total 7.5 03/30/2020 02:49 AM    Albumin 3.1 (L) 03/30/2020 02:49 AM    Globulin 4.4 (H) 03/30/2020 02:49 AM     Lab Results   Component Value Date/Time    INR 1.0 04/01/2020 01:19 AM    Prothrombin time 10.5 04/01/2020 01:19 AM    aPTT 26.5 03/29/2020 09:05 PM      No results found for: IRON, FE, TIBC, IBCT, PSAT, FERR   Lab Results   Component Value Date/Time    pH 7.40 12/27/2014 01:20 AM    PCO2 37 12/27/2014 01:20 AM    PO2 49 (LL) 12/27/2014 01:20 AM     No components found for: [place]   Lab Results   Component Value Date/Time    CK 63 08/26/2018 04:38 PM    CK - MB 2.2 03/23/2018 01:20 PM                Total time: 50 min  Counseling / coordination time, spent as noted above: 40 min  > 50% counseling / coordination?: y    Prolonged service was provided for  []30 min   []75 min in face to face time in the presence of the patient, spent as noted above. Time Start:   Time End:   Note: this can only be billed with 98341 (initial) or 64665 (follow up). If multiple start / stop times, list each separately.

## 2020-04-01 NOTE — PROGRESS NOTES
[facility] Adult  Hospitalist Group                                                                                          Hospitalist Progress Note  Yohan Sarmiento MD  Answering service: [phone] from in house phone        Date of Service:  2020  NAME:  Regi Rios  :  1925  MRN:  [id number]      Admission Summary:    70-year-old female seen and examined today by critical care services due to initial complaints of altered mental status. Patient has past medical history for bronchitis, hypertension only. According to emergency room physician patient had initial complaints of fatigue and her son called EMS due to patient's lack of energy. Patient was altered and did not know where she was at. Patient stated she did have a cough with abdominal pain and weakness associated. Patient initial temperature 100 °F.  No productive cough. Neurosurgery has been consulted for her acute intracranial hemorrhage with subdural hematoma extension. Plan is to continue neurochecks. Obtain respiratory viral panel to rule out any influenza. We will continue patient on Cardene drip for her life-threatening hypertensive emergency. Plan is to follow neurosurgery and neurology recommendations for her life-threatening intercerebral hemorrhage. Continue neurochecks. Transferred out of ICU on 3/31    Interval history / Subjective: Follow up  [address]  Patient seen and examined by the bedside, Labs, images and notes reviewed  Very less response, Nurse discussed about difficulty controlling bp, will add clonidine patch, NPO status currently, Palliative consult pending  Discussed with nursing staff, orders reviewed.    Plan discussed with patient/Family       Assessment & Plan:     Right temporal ICH  -No surgical intervention  Neurology, NS consulted appreciate input  -q4h neuro checks  -goal SBP<140  -added clonidine patch, NPO currently  -PT/OT/speech      Cerebral edema  -due to above  -steroids not indicated  -plans as above per Neuro and NS     Dementia  -Unclear baseline  -Palliative care consult pending     Dysphagia  -failed swallow  -MBS  when more awake  -D5 1/2 NS @ 50mL/hr while NPO    Hypokalemia  Replace and retreat     HTN Emergency  -off Cardene  -Goal SBP<140  -Pt unable to take PO; continue labetolol PRN  -Start Clonidine patch     COPD: Chronic bronchitis  -Stable  -Afeb, no leukocytosis  -No oxygen requirements  -Continue PRN nebs  -Nicotine patch (smoker)     Suspected UTI  -ruled out; UA negative        DVTppx: SCDs  Gippx: Famotidine  Diet: NPO; pt not following commands to eat  Code Status: Full  Activity: w/ assist; fall risk  Discharge: Pending       Hospital Problems  Date Reviewed: 12/29/2014          Codes Class Noted POA    COPD (chronic obstructive pulmonary disease) ([facility] ICD-10-CM: J44.9  ICD-9-CM: 465  3/31/2020 Yes        Dementia ([facility] ICD-10-CM: F03.90  ICD-9-CM: 294.20  Unknown Yes        * (Principal) ICH (intracerebral hemorrhage) ([facility] ICD-10-CM: I61.9  ICD-9-CM: 544  3/29/2020 Unknown        Acute bronchitis ICD-10-CM: J20.9  ICD-9-CM: 466.0  12/27/2014 Yes        HTN (hypertension) ICD-10-CM: I10  ICD-9-CM: 401.9  12/27/2014 Yes                Review of Systems:   Review of systems not obtained due to patient factors. Vital Signs:    Last 24hrs VS reviewed since prior progress note. Most recent are:  Visit Vitals  /73 (BP 1 Location: Left arm, BP Patient Position: At rest;Head of bed elevated (Comment degrees))   Pulse 60   Temp 97.4 °F (36.3 °C)   Resp 17   Wt 45.7 kg (100 lb 12 oz)   SpO2 100%   BMI 16.77 kg/m²         Intake/Output Summary (Last 24 hours) at 4/1/2020 1314  Last data filed at 4/1/2020 0230  Gross per 24 hour   Intake 135.83 ml   Output 410 ml   Net -274.17 ml        Physical Examination:             Constitutional:  No acute distress, very less reponse   ENT:  Oral mucosa moist, oropharynx benign.     Resp:  CTA bilaterally. No wheezing/rhonchi/rales. No accessory muscle use   CV:  Regular rhythm, normal rate, no murmurs, gallops, rubs    GI:  Soft, non distended, non tender. normoactive bowel sounds, no hepatosplenomegaly     Musculoskeletal:  No edema, warm, 2+ pulses throughout    Neurologic:  difficult to assess, very less response           Data Review:    Review and/or order of clinical lab test      Labs:     Recent Labs     04/01/20 0119 03/31/20  0546   WBC 5.9 6.1   HGB 11.1* 11.8   HCT 36.2 38.4    121*     Recent Labs     04/01/20  0119 03/31/20  0546 03/30/20  0309 03/30/20  0249   * 137 138 139   K 3.2* 3.8 3.7 3.7    101 107 108   CO2 23 28 21 21   BUN 10 10 16 16   CREA 1.01 0.99 1.10* 1.07*   GLU 95 75 98 96   CA 8.8 9.1 9.0 9.3   MG 1.4* 1.5*  --  1.8   PHOS 2.4* 2.4*  --  2.8     Recent Labs     03/30/20  0249 03/29/20  1954   SGOT 17 15   ALT 10* 10*   * 156*   TBILI 0.7 0.9   TP 7.5 7.8   ALB 3.1* 3.3*   GLOB 4.4* 4.5*     Recent Labs     04/01/20 0119 03/31/20  0546 03/30/20  0249 03/29/20  2105   INR 1.0 1.0 1.0 1.0   PTP 10.5 10.5 10.7 10.8   APTT  --   --   --  26.5      No results for input(s): FE, TIBC, PSAT, FERR in the last 72 hours. No results found for: FOL, RBCF   No results for input(s): PH, PCO2, PO2 in the last 72 hours.   Recent Labs     03/30/20  1909 03/30/20  0334   TROIQ 0.15* 0.14*     Lab Results   Component Value Date/Time    Cholesterol, total 158 03/30/2020 02:49 AM    HDL Cholesterol 54 03/30/2020 02:49 AM    LDL, calculated 87.2 03/30/2020 02:49 AM    Triglyceride 84 03/30/2020 02:49 AM    CHOL/HDL Ratio 2.9 03/30/2020 02:49 AM     Lab Results   Component Value Date/Time    Glucose (POC) 58 (L) 04/01/2020 12:10 PM    Glucose (POC) 112 (H) 04/01/2020 06:12 AM    Glucose (POC) 101 (H) 04/01/2020 02:10 AM    Glucose (POC) 87 03/31/2020 10:05 PM    Glucose (POC) 140 (H) 03/31/2020 06:18 PM     Lab Results   Component Value Date/Time    Color YELLOW/STRAW 03/29/2020 07:54 PM    Appearance CLEAR 03/29/2020 07:54 PM    Specific gravity 1.015 03/29/2020 07:54 PM    Specific gravity 1.025 08/26/2018 04:38 PM    pH (UA) 7.0 03/29/2020 07:54 PM    Protein NEGATIVE  03/29/2020 07:54 PM    Glucose NEGATIVE  03/29/2020 07:54 PM    Ketone NEGATIVE  03/29/2020 07:54 PM    Bilirubin NEGATIVE  03/29/2020 07:54 PM    Urobilinogen 1.0 03/29/2020 07:54 PM    Nitrites NEGATIVE  03/29/2020 07:54 PM    Leukocyte Esterase NEGATIVE  03/29/2020 07:54 PM    Epithelial cells FEW 03/29/2020 07:54 PM    Bacteria NEGATIVE  03/29/2020 07:54 PM    WBC 0-4 03/29/2020 07:54 PM    RBC 0-5 03/29/2020 07:54 PM         Medications Reviewed:     Current Facility-Administered Medications   Medication Dose Route Frequency    potassium chloride 10 mEq in 100 ml IVPB  10 mEq IntraVENous Q1H    cloNIDine (CATAPRES) 0.1 mg/24 hr patch 1 Patch  1 Patch TransDERmal Q7D    labetaloL (NORMODYNE;TRANDATE) injection 20 mg  20 mg IntraVENous Q2H PRN    haloperidol lactate (HALDOL) injection 2 mg  2 mg IntraVENous Q4H PRN    dextrose 5 % - 0.45% NaCl infusion  50 mL/hr IntraVENous CONTINUOUS    glucose chewable tablet 16 g  4 Tab Oral PRN    glucagon (GLUCAGEN) injection 1 mg  1 mg IntraMUSCular PRN    dextrose 10% infusion 0-250 mL  0-250 mL IntraVENous PRN    nicotine (NICODERM CQ) 14 mg/24 hr patch 1 Patch  1 Patch TransDERmal DAILY    sodium chloride (NS) flush 5-40 mL  5-40 mL IntraVENous Q8H    sodium chloride (NS) flush 5-40 mL  5-40 mL IntraVENous PRN    acetaminophen (TYLENOL) tablet 650 mg  650 mg Oral Q4H PRN    ondansetron (ZOFRAN) injection 4 mg  4 mg IntraVENous Q4H PRN    docusate sodium (COLACE) capsule 100 mg  100 mg Oral BID    famotidine (PF) (PEPCID) 20 mg in 0.9% sodium chloride 10 mL injection  20 mg IntraVENous Q24H    albuterol (PROVENTIL VENTOLIN) nebulizer solution 2.5 mg  2.5 mg Nebulization Q4H PRN ______________________________________________________________________  EXPECTED LENGTH OF STAY: 4d 9h  ACTUAL LENGTH OF STAY:          3                 Janis Torres MD

## 2020-04-01 NOTE — PROGRESS NOTES
Transition of Care Plan     Disposition: TBD pending medical progression/ hospital course, OT recommends SNF    Palliative medicine following      RUR- 15 % Low   Risks    Transport: [facility] phone [phone] vs family  [address] Follow up: PCP/Specialist(s)     ELLIS Tolentino

## 2020-04-01 NOTE — PROGRESS NOTES
Problem: Falls - Risk of  Goal: *Absence of Falls  Description: Document Clydene Bone Fall Risk and appropriate interventions in the flowsheet. Outcome: Progressing Towards Goal  Note: Fall Risk Interventions:  Mobility Interventions: Assess mobility with egress test, Bed/chair exit alarm, PT Consult for mobility concerns    Mentation Interventions: Bed/chair exit alarm, Door open when patient unattended, Reorient patient, Update white board, Toileting rounds    Medication Interventions: Bed/chair exit alarm    Elimination Interventions: Bed/chair exit alarm, Call light in reach, Stay With Me (per policy), Toileting schedule/hourly rounds    History of Falls Interventions: Bed/chair exit alarm, Consult care management for discharge planning, Room close to nurse's station         Problem: Pressure Injury - Risk of  Goal: *Prevention of pressure injury  Description: Document Isidro Scale and appropriate interventions in the flowsheet.   Outcome: Progressing Towards Goal  Note: Pressure Injury Interventions:  Sensory Interventions: Assess changes in LOC, Assess need for specialty bed, Check visual cues for pain, Keep linens dry and wrinkle-free, Minimize linen layers    Moisture Interventions: Absorbent underpads, Minimize layers, Check for incontinence Q2 hours and as needed    Activity Interventions: Pressure redistribution bed/mattress(bed type), PT/OT evaluation    Mobility Interventions: PT/OT evaluation, Pressure redistribution bed/mattress (bed type)    Nutrition Interventions: Document food/fluid/supplement intake, Offer support with meals,snacks and hydration    Friction and Shear Interventions: Lift sheet, Minimize layers      Problem: Hemorrhagic Stroke:   Goal: Activity/Safety  Outcome: Progressing Towards Goal  Goal: Consults, if ordered  Outcome: Progressing Towards Goal  Goal: Diagnostic Test/Procedures  Outcome: Progressing Towards Goal  Goal: Respiratory  Outcome: Progressing Towards Goal  Goal: *Hemodynamically stable  Outcome: Progressing Towards Goal     Problem: Hemorrhagic Stroke: Discharge Outcomes  Goal: *Hemodynamically stable  Outcome: Progressing Towards Goal  Goal: *Absence of aspiration pneumonia  Outcome: Progressing Towards Goal  Goal: *Absence of signs and symptoms of DVT  Outcome: Progressing Towards Goal  Goal: *Absence of aspiration  Outcome: Progressing Towards Goal  Goal: *Progressive mobility and function  Outcome: Progressing Towards Goal     Problem: Risk for Spread of Infection  Goal: Prevent transmission of infectious organism to others  Description: Prevent the transmission of infectious organisms to other patients, staff members, and visitors.   Outcome: Progressing Towards Goal

## 2020-04-01 NOTE — PROGRESS NOTES
Problem: Mobility Impaired (Adult and Pediatric)  Goal: *Acute Goals and Plan of Care (Insert Text)  Description: FUNCTIONAL STATUS PRIOR TO ADMISSION: Patient is a poor historian but states that she was ambulatory with a cane at home. HOME SUPPORT PRIOR TO ADMISSION: Patient states that she was living with family. Physical Therapy Goals  Initiated 4/1/2020  1. Patient will move from supine to sit and sit to supine  in bed with supervision/set-up within 7 day(s). 2.  Patient will transfer from bed to chair and chair to bed with supervision/set-up using the least restrictive device within 7 day(s). 3.  Patient will perform sit to stand with contact guard assist within 7 day(s). 4.  Patient will ambulate with minimal assistance/contact guard assist for 25 feet with the least restrictive device within 7 day(s). Outcome: Progressing Towards Goal   PHYSICAL THERAPY EVALUATION  Patient: Terri Anderson (4 y.o. female)  Date: 4/1/2020  Primary Diagnosis: ICH (intracerebral hemorrhage) ([facility] [address] [I61.9]        Precautions:   Fall(SBP <140)      ASSESSMENT  Based on the objective data described below, the patient presents with decreased functional mobility from baseline level of function. Patient currently limited by confusion, decreased balance, gait instability and decreased activity tolerance. Patient needing Clara x 2 for sit to stand and to take a few steps to [address]. Unable to progress gait as BP elevated outside of parameters in standing. She is overall very fearful of falling and has poor upright balance. Recommend SNF rehab to progress to more independent level of function     Current Level of Function Impacting Discharge (mobility/balance): Clara transfers and to take a few steps to [address]      Other factors to consider for discharge: at risk for falls, confused, below functional baseline     Patient will benefit from skilled therapy intervention to address the above noted impairments.        PLAN :  Recommendations and Planned Interventions: bed mobility training, transfer training, gait training, therapeutic exercises, patient and family training/education, and therapeutic activities      Frequency/Duration: Patient will be followed by physical therapy:  5 times a week to address goals. Recommendation for discharge: (in order for the patient to meet his/her long term goals)  Therapy up to 5 days/week in SNF setting      IF patient discharges home will need the following DME: to be determined (TBD)         SUBJECTIVE:   Patient stated Don't let me fall!     OBJECTIVE DATA SUMMARY:   HISTORY:    Past Medical History:   Diagnosis Date    COPD (chronic obstructive pulmonary disease) ([facility] [address] 3/31/2020    Dementia ([facility] [address]     Hypertension     Other ill-defined conditions(799.89)     2 MI's last one in 2007   History reviewed. No pertinent surgical history. Personal factors and/or comorbidities impacting plan of care:     Home Situation  Home Environment: Private residence  # Steps to Enter: 0  Living Alone: No  Support Systems: Family member(s)(reports lives with brother, mother and nephew)  Patient Expects to be Discharged to[de-identified] Unknown  Current DME Used/Available at Home: Cane, straight    EXAMINATION/PRESENTATION/DECISION MAKING:   Critical Behavior:  Neurologic State: Alert, Confused  Orientation Level: Disoriented X4(can tell her first name, not her last )  Cognition: Decreased attention/concentration, Decreased command following, Memory loss, Poor safety awareness  Safety/Judgement: Not assessed  Hearing:   Auditory  Auditory Impairment: None    Range Of Motion:  AROM: Generally decreased, functional           PROM: Generally decreased, functional           Strength:    Strength: Generally decreased, functional                    Tone & Sensation:   Tone: Normal              Sensation: Intact               Coordination:  Coordination: Generally decreased, functional  Vision:   Acuity: Within Defined Limits  Functional Mobility:  Bed Mobility:     Supine to Sit: Supervision  Sit to Supine: Maximum assistance;Assist x2(unable to process to commands to lay back down)     Transfers:  Sit to Stand: Minimum assistance;Assist x2  Stand to Sit: Minimum assistance;Assist x2        Bed to Chair: (unable to complete due to elevated BP)              Balance:   Sitting: Intact  Standing: Impaired  Standing - Static: Fair  Standing - Dynamic : Fair  Ambulation/Gait Training:  Distance (ft): 4 Feet (ft)  Assistive Device: Gait belt; Other (comment)(hand held A x 2)  Ambulation - Level of Assistance: Minimal assistance;Assist x2     Gait Description (WDL): Exceptions to WDL  Gait Abnormalities: Decreased step clearance;Shuffling gait        Base of Support: Center of gravity altered; Widened     Speed/Jayshree: Shuffled; Slow  Step Length: Left shortened;Right shortened             Pain Rating:  No c/o pain    Activity Tolerance:   Fair and requires rest breaks  Please refer to the flowsheet for vital signs taken during this treatment. After treatment patient left in no apparent distress:   Supine in bed, Call bell within reach, and Bed / chair alarm activated    COMMUNICATION/EDUCATION:   The patients plan of care was discussed with: Physical therapist, Occupational therapist, and Registered nurse. Fall prevention education was provided and the patient/caregiver indicated understanding., Patient/family have participated as able in goal setting and plan of care. , and Patient/family agree to work toward stated goals and plan of care.     Thank you for this referral.  Trupti Edouard, PT, DPT   Time Calculation: 16 mins

## 2020-04-01 NOTE — PROGRESS NOTES
Problem: Falls - Risk of  Goal: *Absence of Falls  Description: Document Tia Santos Fall Risk and appropriate interventions in the flowsheet. Outcome: Progressing Towards Goal  Note: Fall Risk Interventions:  Mobility Interventions: Assess mobility with egress test, Bed/chair exit alarm, Patient to call before getting OOB, PT Consult for mobility concerns    Mentation Interventions: Adequate sleep, hydration, pain control, Bed/chair exit alarm, Door open when patient unattended, Increase mobility, More frequent rounding, Reorient patient, Toileting rounds, Update white board    Medication Interventions: Assess postural VS orthostatic hypotension, Bed/chair exit alarm, Evaluate medications/consider consulting pharmacy, Patient to call before getting OOB, Teach patient to arise slowly    Elimination Interventions: Bed/chair exit alarm, Call light in reach, Patient to call for help with toileting needs, Stay With Me (per policy), Toilet paper/wipes in reach, Toileting schedule/hourly rounds    History of Falls Interventions: Bed/chair exit alarm, Door open when patient unattended, Room close to nurse's station         Problem: Patient Education: Go to Patient Education Activity  Goal: Patient/Family Education  Outcome: Progressing Towards Goal     Problem: Pressure Injury - Risk of  Goal: *Prevention of pressure injury  Description: Document Isidro Scale and appropriate interventions in the flowsheet.   Outcome: Progressing Towards Goal  Note: Pressure Injury Interventions:  Sensory Interventions: Assess changes in LOC    Moisture Interventions: Absorbent underpads, Internal/External fecal devices, Check for incontinence Q2 hours and as needed, Apply protective barrier, creams and emollients, Maintain skin hydration (lotion/cream), Minimize layers    Activity Interventions: Increase time out of bed, PT/OT evaluation, Pressure redistribution bed/mattress(bed type)    Mobility Interventions: Assess need for specialty bed, HOB 30 degrees or less, PT/OT evaluation, Float heels    Nutrition Interventions: Document food/fluid/supplement intake, Offer support with meals,snacks and hydration    Friction and Shear Interventions: Apply protective barrier, creams and emollients, HOB 30 degrees or less, Lift sheet, Lift team/patient mobility team, Minimize layers                Problem: Patient Education: Go to Patient Education Activity  Goal: Patient/Family Education  Outcome: Progressing Towards Goal     Problem: Patient Education: Go to Patient Education Activity  Goal: Patient/Family Education  Outcome: Progressing Towards Goal     Problem: Hemorrhagic Stroke: Day 4  Goal: Activity/Safety  Outcome: Progressing Towards Goal  Goal: Consults, if ordered  Outcome: Progressing Towards Goal  Goal: Diagnostic Test/Procedures  Outcome: Progressing Towards Goal  Goal: Nutrition/Diet  Outcome: Progressing Towards Goal  Goal: Medications  Outcome: Progressing Towards Goal  Goal: Respiratory  Outcome: Progressing Towards Goal  Goal: Treatments/Interventions/Procedures  Outcome: Progressing Towards Goal  Goal: Psychosocial  Outcome: Progressing Towards Goal  Goal: *Hemodynamically stable  Outcome: Progressing Towards Goal  Goal: *Verbalizes anxiety and depression are reduced or absent  Outcome: Progressing Towards Goal  Goal: *Absence of aspiration  Outcome: Progressing Towards Goal  Goal: *Absence of signs and symptoms of DVT  Outcome: Progressing Towards Goal  Goal: *Optimal pain control at patient's stated goal  Outcome: Progressing Towards Goal  Goal: *Tolerating diet  Outcome: Progressing Towards Goal  Goal: *Progressive mobility and function  Outcome: Progressing Towards Goal  Goal: *Rehabilitation readiness  Outcome: Progressing Towards Goal     Problem: Hemorrhagic Stroke: Discharge Outcomes  Goal: *Verbalizes anxiety and depression are reduced or absent  Outcome: Progressing Towards Goal  Goal: *Verbalize understanding of risk factor modification(Stroke Metric)  Outcome: Progressing Towards Goal  Goal: *Optimal pain control at patient's stated goal  Outcome: Progressing Towards Goal  Goal: *Hemodynamically stable  Outcome: Progressing Towards Goal  Goal: *Absence of aspiration pneumonia  Outcome: Progressing Towards Goal  Goal: *Aware of needed dietary changes  Outcome: Progressing Towards Goal  Goal: *Verbalizes understanding and describes medication purposes and frequencies  Outcome: Progressing Towards Goal  Goal: *Tolerating diet  Outcome: Progressing Towards Goal  Goal: *Absence of signs and symptoms of DVT  Outcome: Progressing Towards Goal  Goal: *Absence of aspiration  Outcome: Progressing Towards Goal  Goal: *Progressive mobility and function  Outcome: Progressing Towards Goal  Goal: *Home safety concerns addressed  Outcome: Progressing Towards Goal

## 2020-04-01 NOTE — PROGRESS NOTES
Problem: Dysphagia (Adult)  Goal: *Acute Goals and Plan of Care (Insert Text)  Description: Speech therapy goals  Initiated 4/1/2020   1. Patient will tolerate puree/thin liquid diet without s/s of aspiration within 7 days   2. Patient will tolerate solid trials with SLP to determine safety for diet upgrade within 7 days   Initiated 3/31/2020   1. Patient will participate in re-evaluation of swallow function within 7 days MET 4/1/2020       Outcome: Progressing Towards Goal     SPEECH LANGUAGE PATHOLOGY DYSPHAGIA TREATMENT  Patient: Giovanni Citizen (68 y.o. female)  Date: 4/1/2020  Diagnosis: ICH (intracerebral hemorrhage) ([facility] [address] [I61.9]   ICH (intracerebral hemorrhage) ([facility] [address]       Precautions: aspiration (P) Fall(SBP <140)    ASSESSMENT:  Patient with improved overall function, now with a moderate oral and suspected mild pharyngeal dysphagia. Oral phase characterized by inconsistent recognition of PO items with verbal cues to recognize and drink from a cup (improved with straw inconsistently). Bolus formation manipulation and propulsion were delayed. Laryngeal elevation/excursion appeared overall functional for age via palpation. No s/s of aspiration observed with PO, however, increased risks related to mental status, dependence for feeding, inconsistent recognition of PO items and variability of mental status. Also, note congested cough present outside of PO - this was not noted during PO trials. If persists or concerns of tolerance of diet arise, can consider MBS study for further assessment. Uncertain if patient will meet nutritional needs PO at this time. PLAN:  Recommendations and Planned Interventions:  --Recommend puree/thin liquid diet with strict aspiration precautions. Verbal cues as needed for recognition of PO items. Small, single bites/sips, stop with oral holding or if patient not recognizing PO with cues.  Meds crushed in puree  --will follow closely for diet tolerance and to determine need for possible MBS study pending tolerance and mental status  Patient continues to benefit from skilled intervention to address the above impairments. Continue treatment per established plan of care. Discharge Recommendations: To Be Determined     SUBJECTIVE:   Patient alert, much more interactive though poor initiation for tasks     OBJECTIVE:   Cognitive and Communication Status:  Neurologic State: Alert, Confused  Orientation Level: Disoriented X4(can tell her first name, not her last )  Cognition: Decreased attention/concentration, Decreased command following, Memory loss, Poor safety awareness  Perception: Appears intact  Perseveration: No perseveration noted  Safety/Judgement: Not assessed  Dysphagia Treatment:  Oral Assessment:  Oral Assessment  Labial: Other (comment)(did not follow commands for full assessment )  P. O. Trials:  Patient Position: upright in bed   Vocal quality prior to P.O.: No impairment  Consistency Presented: Ice chips; Thin liquid;Puree  How Presented: SLP-fed/presented;Cup/sip;Spoon;Straw;Successive swallows     Bolus Acceptance: Impaired(intermittently does not recognize PO )  Bolus Formation/Control: Impaired  Type of Impairment: Delayed  Propulsion: Delayed (# of seconds)  Oral Residue: None  Initiation of Swallow: Delayed (# of seconds)  Laryngeal Elevation: Functional(appears functional for age)  Aspiration Signs/Symptoms: None(has a congested cough, but not present with PO )  Pharyngeal Phase Characteristics: Double swallow; Suspected pharyngeal residue  Effective Modifications: (verbal cues to recognize PO )  Cues for Modifications:  Moderate  Comments: mental status will be a limiting factor in safe PO intake     Oral Phase Severity: Moderate  Pharyngeal Phase Severity : Mild(suspected mild )  Exercises:  Laryngeal Exercises:                                                                                                                                   Pain:  Pain Scale 1: Adult Nonverbal Pain Scale  Pain Intensity 1: 0       After treatment:   Patient left in no apparent distress in bed, Call bell within reach, and Nursing notified    COMMUNICATION/EDUCATION:   Patient was educated regarding her deficit(s) of dysphaiga as this relates to her diagnosis of ICH. She demonstrated Poor understanding as evidenced by no response to education. The patient's plan of care including recommendations, planned interventions, and recommended diet changes were discussed with: Occupational therapist and Registered nurse. Rene Mccauley M.CD.  CCC-SLP   Time Calculation: 20 mins

## 2020-04-01 NOTE — PROGRESS NOTES
Bedside and Verbal shift change report given to [facility] RN's (oncoming nurse) by [employer] RN's (offgoing nurse). Report included the following information SBAR, Kardex, MAR, Recent Results, Cardiac Rhythm NSR, Alarm Parameters , Quality Measures and Dual Neuro Assessment.

## 2020-04-01 NOTE — PROGRESS NOTES
Palliative Medicine  [facility]: [phone] ([phone])  [facility]: [phone] ([phone])    Consult received. Chart reviewed. Code Status: Full Code    Advance Care Planning:  Advance Care Planning 4/1/2020   Patient's Healthcare Decision Maker is: Legal Next of Kin     Primary Decision Maker: Brigida Shane Son - [phone]    Primary Decision Maker: Em Rios - Daughter - [phone]      Major Shade is a 80 y.o. female with a pmh of HTN, MI x2, CAD, bronchitis, COPD (current everyday smoking), dementia. PCP Dr Devante Dumont     Patient presented to ED [facility] ED 3/29/20 via EMS reporting fatigue and confusion. CT of her head was performed, which showed right temporal hematoma measuring 3.3 x 1.8 x 1.3 cm and more medially, a hemorrhage measuring 3.5 mm. Also a small SDH measuring 5.7 x 4.1 x 4.3 cm, and in one of the sulci, hemorrhage that measures 8.3 x 6.6 x 6.4 mm. Pt with unknown cognitive baseline and with waxing and waning mental status, may have fallen last week. She was hypertensive in ED with BP of 207/99. Pt takes ASA 81 mg daily, no other blood thinners. She was then transferred to [facility] 3/30/20 for higher level of care. Tx out of ICU to neuro 3/31. Neurology following, currently conservative management with no plans for surgical intervention. Pt has been unable to work with PT/OT at this time d/t no command following (pt had taken haldol same morning for agitation). -->CT 3/30: Stable head CT with known intraparenchymal, subdural, and subarachnoid hemorrhage.  -->MRI brain 3/30: showed small infarct in the right cerebellum.   -->EEG 3/39: mild generalized encephalopathic process, nonspecific in type, focal slowing seen in the right central temporal area which is likely related to the underlying hematoma, no epileptiform features, no seizure was recorded. --> SLP note 3/31: severe oral dysphagia  (possibly PTA); recommend palliative in light of dementia.       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 Spiritual: Jewish  Baseline: independent prior to admission, uses walker/cane to ambulate    Thank you  for the opportunity to be involved in the care of Ms. Pearl Calhoun and her family.     Simin Baker LMSW, Supervisee in Social Work  Palliative Medicine   [phone]

## 2020-04-01 NOTE — PROGRESS NOTES
Problem: Self Care Deficits Care Plan (Adult)  Goal: *Acute Goals and Plan of Care (Insert Text)  Description: Occupational Therapy Goals  Initiated: 4/1/2020   1. Patient will perform grooming with supervision/set-up sitting in chair within 7 day(s). 2.  Patient will perform bathing with min A from chair within 7 day(s). 3.  Patient will perform upper body dressing and lower body dressing with mod A within 7 day(s). 4.  Patient will perform toilet transfers with CGA within 7 day(s). 5.  Patient will perform all aspects of toileting with CGA within 7 day(s). 6.  Patient will initiate one ADL activities with independence within 7 days. 7.  Patient will participate in FM assess if indicated within 7 days. FUNCTIONAL STATUS PRIOR TO ADMISSION: Pt unable to provide much information. Pt reports she lives with her brother, nephew and her mother. Pt is 81 y/o. Anticipate pt's mother is decreased but unable to clarify information. HOME SUPPORT: unable to establish home support. Outcome: Progressing Towards Goal    OCCUPATIONAL THERAPY EVALUATION  Patient: Darrick Butterfield (69 y.o. female)  Date: 4/1/2020  Primary Diagnosis: ICH (intracerebral hemorrhage) ([facility] [address] [I61.9]        Precautions:   Fall(SBP <140)    ASSESSMENT  Based on the objective data described below, the patient presents with decreased independence with all self care and functional mobility following admission for SDH with right cerebellar infarct as well. Pt progressed with grooming EOB but evaluation limited as BP was out of range per MD orders. Pt with no LOB sitting EOB for light grooming activities but noted with decreased command following, decreased attention to activities, difficulty initiating and terminating activities, and poor transition from one activity to another. Recommend home with 24 hour support versus SNF. No family at bedside at this time.      Current Level of Function Impacting Discharge (ADLs/self-care): mod to max A for ADL activities    Functional Outcome Measure: The patient scored 20 on the Barthel Index outcome measure which is indicative of significant ADL Impairment. Other factors to consider for discharge: cognitive status     Patient will benefit from skilled therapy intervention to address the above noted impairments. PLAN :  Recommendations and Planned Interventions: self care training, functional mobility training, therapeutic exercise, balance training, therapeutic activities, endurance activities, patient education, home safety training, and family training/education    Frequency/Duration: Patient will be followed by occupational therapy 5 times a week to address goals. Recommendation for discharge: (in order for the patient to meet his/her long term goals)  Therapy up to 5 days/week in SNF setting or an intensive home health therapy program    This discharge recommendation:  Has been made in collaboration with the attending provider and/or case management    IF patient discharges home will need the following DME: none       SUBJECTIVE:   Patient stated I am feeling alright.     OBJECTIVE DATA SUMMARY:   HISTORY:   Past Medical History:   Diagnosis Date    COPD (chronic obstructive pulmonary disease) ([facility] [address] 3/31/2020    Dementia ([facility] [address]     Hypertension     Other ill-defined conditions(799.89)     2 MI's last one in 2007   History reviewed. No pertinent surgical history.     Expanded or extensive additional review of patient history:     Home Situation  Home Environment: Private residence  # Steps to Enter: 0  Living Alone: No  Support Systems: Family member(s)(reports lives with brother, mother and nephew)  Patient Expects to be Discharged to[de-identified] Unknown  Current DME Used/Available at Home: Cane, straight    Hand dominance: Right    EXAMINATION OF PERFORMANCE DEFICITS:  Cognitive/Behavioral Status:  Neurologic State: Alert;Confused  Orientation Level: Disoriented X4(can tell her first name, not her last )  Cognition: Decreased attention/concentration;Decreased command following;Memory loss;Poor safety awareness  Perception: Appears intact  Perseveration: No perseveration noted  Safety/Judgement: Not assessed    Skin: see nursing notes    Edema: none noted    Hearing: Auditory  Auditory Impairment: None    Vision/Perceptual:                           Acuity: Within Defined Limits         Range of Motion:    AROM: Generally decreased, functional  PROM: Generally decreased, functional                      Strength:    Strength: Generally decreased, functional                Coordination:  Coordination: Generally decreased, functional  Fine Motor Skills-Upper: Right Intact; Left Intact    Gross Motor Skills-Upper: Right Intact; Left Intact    Tone & Sensation:    Tone: Normal  Sensation: Intact                      Balance:  Sitting: Intact  Standing: Impaired  Standing - Static: Fair  Standing - Dynamic : Fair    Functional Mobility and Transfers for ADLs:  Bed Mobility:  Supine to Sit: Supervision  Sit to Supine: Maximum assistance;Assist x2(unable to process to commands to lay back down)    Transfers:  Sit to Stand: Assist x2(Hand held support)  Stand to Sit: Assist x2(Hand held support)  Bed to Chair: (unable to complete due to elevated BP)  Bathroom Mobility: (unable to complete due to elevated BP)  Toilet Transfer : (unable to complete due to elevated BP)    ADL Assessment:  Feeding: Minimum assistance(to attend and complete activities)    Oral Facial Hygiene/Grooming: Minimum assistance(unable to complete due to elevated BP)    Bathing: Maximum assistance    Upper Body Dressing: Maximum assistance    Lower Body Dressing: Maximum assistance    Toileting: Maximum assistance                ADL Intervention and task modifications:     Grooming EOB and overall did ok with activity. Pt was able to complete oral care with assistance to initiate, and terminate activities.  Assisted with side stepping to [facility] with HHA x 2. She does not consistently follow commands so FM was not attempted. Cognitive Retraining  Safety/Judgement: Not assessed    Functional Measure:  Barthel Index:    Bathin  Bladder: 5  Bowels: 0  Groomin  Dressin  Feedin  Mobility: 0  Stairs: 0  Toilet Use: 0  Transfer (Bed to Chair and Back): 5  Total: 20/100        The Barthel ADL Index: Guidelines  1. The index should be used as a record of what a patient does, not as a record of what a patient could do. 2. The main aim is to establish degree of independence from any help, physical or verbal, however minor and for whatever reason. 3. The need for supervision renders the patient not independent. 4. A patient's performance should be established using the best available evidence. Asking the patient, friends/relatives and nurses are the usual sources, but direct observation and common sense are also important. However direct testing is not needed. 5. Usually the patient's performance over the preceding 24-48 hours is important, but occasionally longer periods will be relevant. 6. Middle categories imply that the patient supplies over 50 per cent of the effort. 7. Use of aids to be independent is allowed. Chuck Thomas., Barthel, D.W. (3832). Functional evaluation: the Barthel Index. [facility] (14)2. SOFY Silva Se, Geremias Delong, Ema Martinez., [place], [address] [place] [address] (). Measuring the change indisability after inpatient rehabilitation; comparison of the responsiveness of the Barthel Index and Functional [place] Measure. Journal of Neurology, Neurosurgery, and Psychiatry, 66(4), 663-294. Federico Jaime, N.J.A, SAMANTHA Stiles, & Mary Hu MArlenA. (2004.) Assessment of post-stroke quality of life in cost-effectiveness studies: The usefulness of the Barthel Index and the EuroQoL-5D.  Quality of Life Research, 15, 874-71     Occupational Therapy Evaluation Charge Determination   History Examination Decision-Making   LOW Complexity : Brief history review  HIGH Complexity : 5 or more performance deficits relating to physical, cognitive , or psychosocial skils that result in activity limitations and / or participation restrictions HIGH Complexity : Patient presents with comorbidities that affect occupational performance. Signifigant modification of tasks or assistance (eg, physical or verbal) with assessment (s) is necessary to enable patient to complete evaluation       Based on the above components, the patient evaluation is determined to be of the following complexity level: LOW   Pain Rating:  No pain reported    Activity Tolerance:   Good  Patient Vitals for the past 4 hrs:   Temp Pulse Resp BP SpO2   04/01/20 0914 -- sitting EOB 65 19 165/48 100 %         Please refer to the flowsheet for vital signs taken during this treatment. After treatment patient left in no apparent distress:    Supine in bed and Call bell within reach    COMMUNICATION/EDUCATION:   The patients plan of care was discussed with: Physical therapist and Registered nurse. Home safety education was provided and the patient/caregiver indicated understanding. and Patient/family have participated as able in goal setting and plan of care. This patients plan of care is appropriate for delegation to [facility].     Thank you for this referral.  Irving Dubin, OT  Time Calculation: 33 mins

## 2020-04-02 ENCOUNTER — TELEPHONE (OUTPATIENT)
Dept: NEUROLOGY | Age: 85
End: 2020-04-02

## 2020-04-02 LAB
ANION GAP SERPL CALC-SCNC: 9 MMOL/L (ref 5–15)
BASOPHILS # BLD: 0.1 K/UL (ref 0–0.1)
BASOPHILS NFR BLD: 1 % (ref 0–1)
BUN SERPL-MCNC: 16 MG/DL (ref 6–20)
BUN/CREAT SERPL: 12 (ref 12–20)
CALCIUM SERPL-MCNC: 8.8 MG/DL (ref 8.5–10.1)
CHLORIDE SERPL-SCNC: 102 MMOL/L (ref 97–108)
CO2 SERPL-SCNC: 23 MMOL/L (ref 21–32)
CREAT SERPL-MCNC: 1.31 MG/DL (ref 0.55–1.02)
DIFFERENTIAL METHOD BLD: ABNORMAL
EOSINOPHIL # BLD: 1.3 K/UL (ref 0–0.4)
EOSINOPHIL NFR BLD: 22 % (ref 0–7)
ERYTHROCYTE [DISTWIDTH] IN BLOOD BY AUTOMATED COUNT: 15.4 % (ref 11.5–14.5)
GLUCOSE BLD STRIP.AUTO-MCNC: 128 MG/DL (ref 65–100)
GLUCOSE BLD STRIP.AUTO-MCNC: 74 MG/DL (ref 65–100)
GLUCOSE BLD STRIP.AUTO-MCNC: 93 MG/DL (ref 65–100)
GLUCOSE SERPL-MCNC: 109 MG/DL (ref 65–100)
HCT VFR BLD AUTO: 36.9 % (ref 35–47)
HGB BLD-MCNC: 11.6 G/DL (ref 11.5–16)
IMM GRANULOCYTES # BLD AUTO: 0 K/UL (ref 0–0.04)
IMM GRANULOCYTES NFR BLD AUTO: 0 % (ref 0–0.5)
LYMPHOCYTES # BLD: 1.8 K/UL (ref 0.8–3.5)
LYMPHOCYTES NFR BLD: 31 % (ref 12–49)
MAGNESIUM SERPL-MCNC: 1.5 MG/DL (ref 1.6–2.4)
MCH RBC QN AUTO: 22.5 PG (ref 26–34)
MCHC RBC AUTO-ENTMCNC: 31.4 G/DL (ref 30–36.5)
MCV RBC AUTO: 71.5 FL (ref 80–99)
MONOCYTES # BLD: 0.6 K/UL (ref 0–1)
MONOCYTES NFR BLD: 11 % (ref 5–13)
NEUTS SEG # BLD: 2.1 K/UL (ref 1.8–8)
NEUTS SEG NFR BLD: 35 % (ref 32–75)
NRBC # BLD: 0 K/UL (ref 0–0.01)
NRBC BLD-RTO: 0 PER 100 WBC
PHOSPHATE SERPL-MCNC: 2.5 MG/DL (ref 2.6–4.7)
PLATELET # BLD AUTO: 160 K/UL (ref 150–400)
PLATELET COMMENTS,PCOM: ABNORMAL
PMV BLD AUTO: ABNORMAL FL (ref 8.9–12.9)
POTASSIUM SERPL-SCNC: 3.8 MMOL/L (ref 3.5–5.1)
RBC # BLD AUTO: 5.16 M/UL (ref 3.8–5.2)
RBC MORPH BLD: ABNORMAL
SERVICE CMNT-IMP: ABNORMAL
SERVICE CMNT-IMP: NORMAL
SERVICE CMNT-IMP: NORMAL
SODIUM SERPL-SCNC: 134 MMOL/L (ref 136–145)
WBC # BLD AUTO: 5.9 K/UL (ref 3.6–11)

## 2020-04-02 PROCEDURE — 74011250637 HC RX REV CODE- 250/637: Performed by: INTERNAL MEDICINE

## 2020-04-02 PROCEDURE — 80048 BASIC METABOLIC PNL TOTAL CA: CPT

## 2020-04-02 PROCEDURE — 82962 GLUCOSE BLOOD TEST: CPT

## 2020-04-02 PROCEDURE — 97535 SELF CARE MNGMENT TRAINING: CPT

## 2020-04-02 PROCEDURE — 97530 THERAPEUTIC ACTIVITIES: CPT

## 2020-04-02 PROCEDURE — 74011000258 HC RX REV CODE- 258: Performed by: NURSE PRACTITIONER

## 2020-04-02 PROCEDURE — 92526 ORAL FUNCTION THERAPY: CPT

## 2020-04-02 PROCEDURE — 74011250637 HC RX REV CODE- 250/637: Performed by: HOSPITALIST

## 2020-04-02 PROCEDURE — 36415 COLL VENOUS BLD VENIPUNCTURE: CPT

## 2020-04-02 PROCEDURE — 83735 ASSAY OF MAGNESIUM: CPT

## 2020-04-02 PROCEDURE — 65660000000 HC RM CCU STEPDOWN

## 2020-04-02 PROCEDURE — 97116 GAIT TRAINING THERAPY: CPT

## 2020-04-02 PROCEDURE — 74011000250 HC RX REV CODE- 250: Performed by: NURSE PRACTITIONER

## 2020-04-02 PROCEDURE — 84100 ASSAY OF PHOSPHORUS: CPT

## 2020-04-02 PROCEDURE — 74011000250 HC RX REV CODE- 250: Performed by: INTERNAL MEDICINE

## 2020-04-02 PROCEDURE — 74011250637 HC RX REV CODE- 250/637: Performed by: NURSE PRACTITIONER

## 2020-04-02 PROCEDURE — 74011250636 HC RX REV CODE- 250/636: Performed by: NURSE PRACTITIONER

## 2020-04-02 PROCEDURE — 85025 COMPLETE CBC W/AUTO DIFF WBC: CPT

## 2020-04-02 RX ORDER — POLYETHYLENE GLYCOL 3350 17 G/17G
17 POWDER, FOR SOLUTION ORAL 2 TIMES DAILY
Status: DISCONTINUED | OUTPATIENT
Start: 2020-04-02 | End: 2020-04-03 | Stop reason: HOSPADM

## 2020-04-02 RX ADMIN — LABETALOL HYDROCHLORIDE 20 MG: 5 INJECTION INTRAVENOUS at 06:15

## 2020-04-02 RX ADMIN — Medication 10 ML: at 06:26

## 2020-04-02 RX ADMIN — LABETALOL HYDROCHLORIDE 20 MG: 5 INJECTION INTRAVENOUS at 21:33

## 2020-04-02 RX ADMIN — Medication 10 ML: at 21:34

## 2020-04-02 RX ADMIN — FAMOTIDINE 20 MG: 10 INJECTION, SOLUTION INTRAVENOUS at 23:28

## 2020-04-02 RX ADMIN — Medication 10 ML: at 13:47

## 2020-04-02 RX ADMIN — DEXTROSE MONOHYDRATE AND SODIUM CHLORIDE 50 ML/HR: 5; .45 INJECTION, SOLUTION INTRAVENOUS at 09:27

## 2020-04-02 RX ADMIN — LABETALOL HYDROCHLORIDE 20 MG: 5 INJECTION INTRAVENOUS at 01:41

## 2020-04-02 RX ADMIN — POLYETHYLENE GLYCOL 3350 17 G: 17 POWDER, FOR SOLUTION ORAL at 17:59

## 2020-04-02 RX ADMIN — LABETALOL HYDROCHLORIDE 20 MG: 5 INJECTION INTRAVENOUS at 19:37

## 2020-04-02 NOTE — PROGRESS NOTES
Palliative Medicine Consult  Kieran: [phone]    Patient Name: Janneth Rodriguez  YOB: 1925    Date of Initial Consult: April 1, 2020  Reason for Consult: Care Decisions  Requesting Provider: Ally Cardozo NP  Primary Care Physician: Felicia Hoang MD     SUMMARY:   Janneth Rodriguez is a 80 y.o. female admitted on 3/29/2020 from home with a diagnosis of   Chest pain, ICH, cerebral edema, acute respiratory failure, acute bronchitis, elevated troponin, hypertensive emergency, AMS, ? UTI,  Hypokalemia. Seen by Neurosurgery and there are no plans for surgery. ICU initially but now on neuro unit. PMH:COPD, dementia, HTN, dysphagia    Current medical issues leading to Palliative Medicine involvement include: support with care decisions. 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 Spiritual: Gnosticism  Baseline: independent prior to admission, uses walker/cane to ambulate    Full code    Social: 3 children, has been living with son for about 3 years and was at home prior to that, pt continues to smoke, prior to this admission, she was able to stay home alone for periods, she had falls at times (about one every other month), eating was fair, she was able to get up and down the stairs independently and completed all of her ADL's     PALLIATIVE DIAGNOSES:   1. Shortness of breath~ resolved  2. Dysphagia  3. Acute stroke  4. Physical debility      PLAN:   1. Pt unable to provide any information due to medical condition  2. Spoke with Krystal Davila. He says he has documents naming him as the MPOA. Copy requested  3. He has a clear understanding of the patient's condition  4.  Inquired about pt wishes, goals at this time, concerns  5. Son would like the pt to transition to rehab upon dc. He understands the risk of her ramya COVID and being away from family, but he feels that it would be a disservice not to pursue rehab  6. Code status discussed and son feels strongly about full code status. I explained the risks given age and morbidities and he verbalized an understanding. 7. Goals are clear. All questions answered. Contact information provided  8. Will see again only if asked  9. Initial consult note routed to primary continuity provider and/or primary health care team members  10. Communicated plan of care with: Palliative IDT, [facility] Team     GOALS OF CARE / TREATMENT PREFERENCES:     GOALS OF CARE:  Patient/Health Care Proxy Stated Goals: Prolong life    TREATMENT PREFERENCES:   Code Status: Full Code    Advance Care Planning:  [x] [facility] Interdisciplinary Team has updated the ACP Navigator with Chin and Patient Capacity      Primary Decision Maker (Active): Sera Gus - [phone]  Advance Care Planning 4/2/2020   Patient's Healthcare Decision Maker is: -   Confirm Advance Directive None       Medical Interventions: Full interventions     Other Instructions:   Artificially Administered Nutrition: No feeding tube     Other:    As far as possible, the palliative care team has discussed with patient / health care proxy about goals of care / treatment preferences for patient.      HISTORY:     History obtained from: chart    CHIEF COMPLAINT: pt admitted with aforementioned history and issues    HPI/SUBJECTIVE:    The patient is:   [] Verbal and participatory  [x] Non-participatory due to:   Medical condition     Clinical Pain Assessment (nonverbal scale for severity on nonverbal patients):   Clinical Pain Assessment  Severity: 0     Activity (Movement): Lying quietly, normal position    Duration: for how long has pt been experiencing pain (e.g., 2 days, 1 month, years)  Frequency: how often pain is an issue (e.g., several times per day, once every few days, constant)     FUNCTIONAL ASSESSMENT:     Palliative Performance Scale (PPS):  PPS: 30       PSYCHOSOCIAL/SPIRITUAL SCREENING:     Palliative IDT has assessed this patient for cultural preferences / practices and a referral made as appropriate to needs (Cultural Services, Patient Advocacy, Ethics, etc.)    Any spiritual / Rastafarian concerns:  [] Yes /  [] No    Caregiver Burnout:  [] Yes /  [] No /  [x] No Caregiver Present      Anticipatory grief assessment:   [] Normal  / [] Maladaptive       ESAS Anxiety: Anxiety: 0    ESAS Depression:          REVIEW OF SYSTEMS:     Positive and pertinent negative findings in ROS are noted above in HPI. The following systems were [x] reviewed objectively / [] unable to be reviewed as noted in HPI  Other findings are noted below. Systems: constitutional, ears/nose/mouth/throat, respiratory, gastrointestinal, genitourinary, musculoskeletal, integumentary, neurologic, psychiatric, endocrine. Positive findings noted below. Modified ESAS Completed by: provider   Fatigue: 5 Drowsiness: 3     Pain: 0   Anxiety: 0 Nausea: 0   Anorexia: 0 Dyspnea: 0           Stool Occurrence(s): 1        PHYSICAL EXAM:     From RN flowsheet:  Wt Readings from Last 3 Encounters:   04/02/20 91 lb 11.4 oz (41.6 kg)   03/29/20 87 lb 11.9 oz (39.8 kg)   08/26/18 113 lb (51.3 kg)     Blood pressure 159/69, pulse 63, temperature 97.8 °F (36.6 °C), resp. rate 21, weight 91 lb 11.4 oz (41.6 kg), SpO2 99 %.     Pain Scale 1: Numeric (0 - 10)  Pain Intensity 1: 0                 Last bowel movement, if known:     Constitutional: oob in chair, combing hair, nonverbal, nad  Eyes: pupils equal, anicteric  ENMT: no nasal discharge, moist mucous membranes  Cardiovascular: regular rhythm, distal pulses intact  Respiratory: breathing not labored, symmetric  Gastrointestinal: soft non-tender, +bowel sounds  Musculoskeletal: no deformity, no tenderness to palpation  Skin: warm, dry  Neurologic: following no commands, stroke  Psychiatric:        HISTORY:     Principal Problem:    ICH (intracerebral hemorrhage) ([facility] (3/29/2020)    Active Problems:    Acute bronchitis (12/27/2014)      HTN (hypertension) (12/27/2014)      COPD (chronic obstructive pulmonary disease) ([facility] (3/31/2020)      Dementia (HCC) ()      Past Medical History:   Diagnosis Date    COPD (chronic obstructive pulmonary disease) ([facility] 3/31/2020    Dementia ([facility]     Hypertension     Other ill-defined conditions(799.89)     2 MI's last one in 2007      History reviewed. No pertinent surgical history. History reviewed. No pertinent family history. History reviewed, no pertinent family history.   Social History     Tobacco Use    Smoking status: Current Every Day Smoker    Smokeless tobacco: Never Used   Substance Use Topics    Alcohol use: Yes     No Known Allergies   Current Facility-Administered Medications   Medication Dose Route Frequency    polyethylene glycol (MIRALAX) packet 17 g  17 g Oral BID    cloNIDine (CATAPRES) 0.1 mg/24 hr patch 1 Patch  1 Patch TransDERmal Q7D    labetaloL (NORMODYNE;TRANDATE) injection 20 mg  20 mg IntraVENous Q2H PRN    haloperidol lactate (HALDOL) injection 2 mg  2 mg IntraVENous Q4H PRN    dextrose 5 % - 0.45% NaCl infusion  50 mL/hr IntraVENous CONTINUOUS    glucose chewable tablet 16 g  4 Tab Oral PRN    glucagon (GLUCAGEN) injection 1 mg  1 mg IntraMUSCular PRN    dextrose 10% infusion 0-250 mL  0-250 mL IntraVENous PRN    nicotine (NICODERM CQ) 14 mg/24 hr patch 1 Patch  1 Patch TransDERmal DAILY    sodium chloride (NS) flush 5-40 mL  5-40 mL IntraVENous Q8H    sodium chloride (NS) flush 5-40 mL  5-40 mL IntraVENous PRN    acetaminophen (TYLENOL) tablet 650 mg  650 mg Oral Q4H PRN    ondansetron (ZOFRAN) injection 4 mg  4 mg IntraVENous Q4H PRN    famotidine (PF) (PEPCID) 20 mg in 0.9% sodium chloride 10 mL injection  20 mg IntraVENous Q24H    albuterol (PROVENTIL VENTOLIN) nebulizer solution 2.5 mg  2.5 mg Nebulization Q4H PRN          LAB AND IMAGING FINDINGS:     Lab Results   Component Value Date/Time    WBC 5.9 04/02/2020 12:40 AM    HGB 11.6 04/02/2020 12:40 AM    PLATELET 252 59/43/9191 12:40 AM     Lab Results   Component Value Date/Time    Sodium 134 (L) 04/02/2020 12:40 AM    Potassium 3.8 04/02/2020 12:40 AM    Chloride 102 04/02/2020 12:40 AM    CO2 23 04/02/2020 12:40 AM    BUN 16 04/02/2020 12:40 AM    Creatinine 1.31 (H) 04/02/2020 12:40 AM    Calcium 8.8 04/02/2020 12:40 AM    Magnesium 1.5 (L) 04/02/2020 12:40 AM    Phosphorus 2.5 (L) 04/02/2020 12:40 AM      Lab Results   Component Value Date/Time    AST (SGOT) 17 03/30/2020 02:49 AM    Alk. phosphatase 151 (H) 03/30/2020 02:49 AM    Protein, total 7.5 03/30/2020 02:49 AM    Albumin 3.1 (L) 03/30/2020 02:49 AM    Globulin 4.4 (H) 03/30/2020 02:49 AM     Lab Results   Component Value Date/Time    INR 1.0 04/01/2020 01:19 AM    Prothrombin time 10.5 04/01/2020 01:19 AM    aPTT 26.5 03/29/2020 09:05 PM      No results found for: IRON, FE, TIBC, IBCT, PSAT, FERR   Lab Results   Component Value Date/Time    pH 7.40 12/27/2014 01:20 AM    PCO2 37 12/27/2014 01:20 AM    PO2 49 (LL) 12/27/2014 01:20 AM     No components found for: [place]   Lab Results   Component Value Date/Time    CK 63 08/26/2018 04:38 PM    CK - MB 2.2 03/23/2018 01:20 PM                Total time: 45 min  Counseling / coordination time, spent as noted above: 40 min  > 50% counseling / coordination?: y    Prolonged service was provided for  []30 min   []75 min in face to face time in the presence of the patient, spent as noted above. Time Start:   Time End:   Note: this can only be billed with 22732 (initial) or 42026 (follow up). If multiple start / stop times, list each separately.

## 2020-04-02 NOTE — PROGRESS NOTES
Problem: Mobility Impaired (Adult and Pediatric)  Goal: *Acute Goals and Plan of Care (Insert Text)  Description: FUNCTIONAL STATUS PRIOR TO ADMISSION: Patient is a poor historian but states that she was ambulatory with a cane at home. HOME SUPPORT PRIOR TO ADMISSION: Patient states that she was living with family. Physical Therapy Goals  Initiated 4/1/2020  1. Patient will move from supine to sit and sit to supine  in bed with supervision/set-up within 7 day(s). 2.  Patient will transfer from bed to chair and chair to bed with supervision/set-up using the least restrictive device within 7 day(s). 3.  Patient will perform sit to stand with contact guard assist within 7 day(s). 4.  Patient will ambulate with minimal assistance/contact guard assist for 25 feet with the least restrictive device within 7 day(s). Outcome: Progressing Towards Goal   PHYSICAL THERAPY TREATMENT  Patient: Giovanni Hernándezzen (12 y.o. female)  Date: 4/2/2020  Diagnosis: [facility] [I61.9]   Precautions: Fall, Bed Alarm(SBP<140)  Chart, physical therapy assessment, plan of care and goals were reviewed. ASSESSMENT  Patient continues with skilled PT services and is progressing towards goals. Today she was able to transfer to a chair, and participate in a short distance gait trial in her room with a RW. She required frequent redirection to task due to being easily distracted and her baseline confusion (taking hands off RW stating she \"couldn't leave those dirty clothes. \" Difficulty following commands/coordinating for backing up to the chair and impulsive with transfers. Intermittently with slurred speech and drooling out of L side of mouth. Will need 24 hr hands on assist to steady with any standing activities or gait upon d/c home. May also need RW. Highly recommend HHPT if family can physical assist with all gait and standing but if family not able to provide assist then SNF.      Current Level of Function Impacting Discharge (mobility/balance): min-mod Ax1-2 for transfers and gait with RW    Other factors to consider for discharge: dementia, new ICH, medical complexity         PLAN :  Patient continues to benefit from skilled intervention to address the above impairments. Continue treatment per established plan of care. to address goals. Recommendation for discharge: (in order for the patient to meet his/her long term goals)  Physical therapy at least 2 days/week in the home AND ensure assist and/or supervision for safety with all standing and ambulating activities due to very high fall risk with no insight into safety or deficits or SNF if family cannot assist    This discharge recommendation:  Has been made in collaboration with the attending provider and/or case management    IF patient discharges home will need the following DME: rolling walker- will need to clarify with family if they own        SUBJECTIVE:   Patient stated I can't leave those dirty clothes there.     OBJECTIVE DATA SUMMARY:   Critical Behavior:  Neurologic State: Alert  Orientation Level: Oriented to person, Disoriented to place, Disoriented to situation, Disoriented to time  Cognition: Decreased attention/concentration, Decreased command following, Poor safety awareness  Safety/Judgement: Decreased awareness of environment, Decreased awareness of need for assistance, Decreased awareness of need for safety  Functional Mobility Training:  Bed Mobility:  Supine to Sit: Supervision  Sit to Supine: (in chair)  Scooting: Stand-by assistance  Transfers:  Sit to Stand: Minimum assistance; Moderate assistance;Assist x1(sukhjinder HHA)  Stand to Sit: Minimum assistance;Assist x1(patient sitting impulsively)  Bed to Chair: Minimum assistance; Moderate assistance;Assist x1(sukhjinder HHA)  Balance:  Sitting: Intact; Without support  Standing: Impaired; With support(sukhjinder HHA vs RW)  Standing - Static: Fair;Constant support  Standing - Dynamic : Fair;Constant support  Ambulation/Gait Training:  Distance (ft): 8 Feet (ft)  Assistive Device: Gait belt;Walker, rolling(transfer with HHA and ambulation with RW)  Ambulation - Level of Assistance: Minimal assistance; Moderate assistance;Assist x1;Assist x2(min Ax2 via HHAx2, mod Ax1 with RW)  Gait Abnormalities: Shuffling gait(forward flexed posture)  Base of Support: Narrowed  Speed/Jayshree: Shuffled; Slow  Step Length: Left shortened;Right shortened  Activity Tolerance:   Good, requires rest breaks, and BP tolerated activity well, >140 with transfer to chair but stabilized and then <140 SBP after ambulation   Please refer to the flowsheet for vital signs taken during this treatment. After treatment patient left in no apparent distress:   Sitting in chair, Heels elevated for pressure relief, Call bell within reach, and Bed / chair alarm activated    COMMUNICATION/COLLABORATION:   The patients plan of care was discussed with: Occupational therapist and Registered nurse.      Graciela Pelletier, PT, DPT   Time Calculation: 26 mins

## 2020-04-02 NOTE — PROGRESS NOTES
Problem: Falls - Risk of  Goal: *Absence of Falls  Description: Document Tammy Sanchez Fall Risk and appropriate interventions in the flowsheet. Outcome: Progressing Towards Goal  Note: Fall Risk Interventions:  Mobility Interventions: Bed/chair exit alarm, Communicate number of staff needed for ambulation/transfer, Patient to call before getting OOB    Mentation Interventions: Adequate sleep, hydration, pain control, Bed/chair exit alarm, Evaluate medications/consider consulting pharmacy    Medication Interventions: Assess postural VS orthostatic hypotension, Bed/chair exit alarm, Evaluate medications/consider consulting pharmacy    Elimination Interventions: Call light in reach, Patient to call for help with toileting needs, Stay With Me (per policy)    History of Falls Interventions: Bed/chair exit alarm, Door open when patient unattended, Room close to nurse's station         Problem: Pressure Injury - Risk of  Goal: *Prevention of pressure injury  Description: Document Isidro Scale and appropriate interventions in the flowsheet. Outcome: Progressing Towards Goal  Note: Pressure Injury Interventions:  Sensory Interventions: Keep linens dry and wrinkle-free, Maintain/enhance activity level, Minimize linen layers, Monitor skin under medical devices, Turn and reposition approx.  every two hours (pillows and wedges if needed)    Moisture Interventions: Check for incontinence Q2 hours and as needed, Maintain skin hydration (lotion/cream), Minimize layers    Activity Interventions: Increase time out of bed, Pressure redistribution bed/mattress(bed type), PT/OT evaluation    Mobility Interventions: PT/OT evaluation, Pressure redistribution bed/mattress (bed type)    Nutrition Interventions: Document food/fluid/supplement intake    Friction and Shear Interventions: Apply protective barrier, creams and emollients                Problem: Hemorrhagic Stroke: Day 4  Goal: Activity/Safety  Outcome: Progressing Towards Goal  Goal: Consults, if ordered  Outcome: Progressing Towards Goal  Goal: Diagnostic Test/Procedures  Outcome: Progressing Towards Goal  Goal: Medications  Outcome: Progressing Towards Goal

## 2020-04-02 NOTE — TELEPHONE ENCOUNTER
Informed [facility] that we have received these results. Dr. Leopold Manes, please review at your convenience. Thank you.

## 2020-04-02 NOTE — PROGRESS NOTES
Problem: Falls - Risk of  Goal: *Absence of Falls  Description: Document Maria Fernanda Whitehead Fall Risk and appropriate interventions in the flowsheet. Outcome: Progressing Towards Goal  Note: Fall Risk Interventions:  Mobility Interventions: Bed/chair exit alarm, Communicate number of staff needed for ambulation/transfer, Patient to call before getting OOB    Mentation Interventions: Adequate sleep, hydration, pain control, Bed/chair exit alarm, Evaluate medications/consider consulting pharmacy    Medication Interventions: Assess postural VS orthostatic hypotension, Bed/chair exit alarm, Evaluate medications/consider consulting pharmacy    Elimination Interventions: Call light in reach, Patient to call for help with toileting needs, Stay With Me (per policy)    History of Falls Interventions: Bed/chair exit alarm, Door open when patient unattended, Room close to nurse's station         Problem: Pressure Injury - Risk of  Goal: *Prevention of pressure injury  Description: Document Isidro Scale and appropriate interventions in the flowsheet. Outcome: Progressing Towards Goal  Note: Pressure Injury Interventions:  Sensory Interventions: Keep linens dry and wrinkle-free, Maintain/enhance activity level, Minimize linen layers, Monitor skin under medical devices, Turn and reposition approx.  every two hours (pillows and wedges if needed)    Moisture Interventions: Check for incontinence Q2 hours and as needed, Maintain skin hydration (lotion/cream), Minimize layers    Activity Interventions: Increase time out of bed, Pressure redistribution bed/mattress(bed type), PT/OT evaluation    Mobility Interventions: PT/OT evaluation, Pressure redistribution bed/mattress (bed type)    Nutrition Interventions: Document food/fluid/supplement intake    Friction and Shear Interventions: Apply protective barrier, creams and emollients                Problem: Hemorrhagic Stroke: Discharge Outcomes  Goal: *Verbalize understanding of risk factor modification(Stroke Metric)  Outcome: Progressing Towards Goal  Goal: *Optimal pain control at patient's stated goal  Outcome: Progressing Towards Goal  Goal: *Absence of aspiration pneumonia  Outcome: Progressing Towards Goal

## 2020-04-02 NOTE — PROGRESS NOTES
Problem: Dysphagia (Adult)  Goal: *Acute Goals and Plan of Care (Insert Text)  Description: Speech therapy goals  Initiated 4/1/2020   1. Patient will tolerate puree/thin liquid diet without s/s of aspiration within 7 days   2. Patient will tolerate solid trials with SLP to determine safety for diet upgrade within 7 days   Initiated 3/31/2020   1. Patient will participate in re-evaluation of swallow function within 7 days MET 4/1/2020       Outcome: Progressing Towards Goal     SPEECH LANGUAGE PATHOLOGY DYSPHAGIA TREATMENT  Patient: Renae Burrows (20 y.o. female)  Date: 4/2/2020  Diagnosis: ICH (intracerebral hemorrhage) ([facility] [I61.9]   ICH (intracerebral hemorrhage) ([facility]       Precautions:  Fall(SBP <140)    ASSESSMENT:  Pt mentation continues to be pt's most limiting factor for PO intake at this time. However, pt tolerating pureed diet and thin liquids without any overt difficulty, and ate 100% of her meal tray this morning. Given mentation, did not feel comfortable trialing solid consistency yet. Would recommend continuing pureed diet/thin liquids with supervision. PLAN:  Recommendations and Planned Interventions:  --pureed diet/thin liquids  --supervision (see subjective for justification)  --upright in bed  --small sips/bites    Patient continues to benefit from skilled intervention to address the above impairments. Continue treatment per established plan of care. Discharge Recommendations: To Be Determined     SUBJECTIVE:   Patient stated, See? It's empty! \" as she turned cup over and poured coffee over herself.     OBJECTIVE:   Cognitive and Communication Status:  Neurologic State: Alert  Orientation Level: Oriented to person, Disoriented to place, Disoriented to situation, Disoriented to time  Cognition: Decreased attention/concentration, Decreased command following, Poor safety awareness  Perception: Appears intact  Perseveration: Perseverates during ADLS  Safety/Judgement: Decreased awareness of environment, Decreased awareness of need for assistance, Decreased awareness of need for safety  Dysphagia Treatment:  Oral Assessment:  Oral Assessment  Labial: No impairment  Oral Hygiene: oral mucosa moist and clear of secretions  Lingual: No impairment  Velum: No impairment  Mandible: No impairment  P.O. Trials:  Patient Position: upright in bed  Vocal quality prior to P.O.: No impairment  Consistency Presented: Thin liquid;Puree  How Presented: Self-fed/presented;Cup/sip;Spoon;Straw     Bolus Acceptance: No impairment  Bolus Formation/Control: No impairment     Propulsion: No impairment  Oral Residue: None  Initiation of Swallow: No impairment  Laryngeal Elevation: Functional  Aspiration Signs/Symptoms: None  Pharyngeal Phase Characteristics: No impairment, issues, or problems              Oral Phase Severity: No impairment(none with puree)  Pharyngeal Phase Severity : Mild    Pain:  Pain Scale 1: Numeric (0 - 10)  Pain Intensity 1: 0       After treatment:   Patient left in no apparent distress in bed and Nursing notified    COMMUNICATION/EDUCATION:   Patient was educated regarding her deficit(s) of dysphagia as this relates to her diagnosis of ICH. She demonstrated Poor  understanding as evidenced by impaired mentation. The patient's plan of care including recommendations, planned interventions, and recommended diet changes were discussed with: Registered nurse.      Saroj Mckeon SLP  Time Calculation: 9 mins

## 2020-04-02 NOTE — PROGRESS NOTES
Bedside and Verbal shift change report given to Bipin Muñoz RN (oncoming nurse) by Alvina Short (offgoing nurse). Report included the following information SBAR, Kardex, MAR, Recent Results and Cardiac Rhythm NSR.

## 2020-04-02 NOTE — PROGRESS NOTES
NUTRITION COMPLETE ASSESSMENT    RECOMMENDATIONS:   1. Continue diet per SLP- pureed/thin. No dietary restrictions otherwise    Add Magic cup and ensure compact to try for weight gain. Interventions/Plan:   Food/Nutrient Delivery:  General/healthful diet Commercial supplement            Assessment:   Reason for Assessment:   [x]At Nutrition Risk  [x]Other underweight, age, pureed diet    Diet: Puree/thin  Supplements: Add magic cup and Ensure compact  Nutritionally Significant Medications: [x] Reviewed & Includes: IVF: D5 w 1/2 NS @ 50 mL/hr. KCl, Pepcid, Colace. Meal Intake:   Patient Vitals for the past 100 hrs:   % Diet Eaten   04/01/20 1740 80 %   04/01/20 1200 60 %       Pre-Hospitalization:  Usual Appetite: Fair    Current Hospitalization:   Fluid Restriction:    Appetite: Poor  PO Ability: Total feed Average po intake:Less than 25%  Average supplements intake:          Objective:  80 y.o. female admitted with ICH (intracerebral hemorrhage) ([facility] [I61.9]    Pt has a past medical history of COPD (chronic obstructive pulmonary disease) ([facility] (3/31/2020), [facility] Hypertension, and Other ill-defined conditions(799.89). Pt admitted with ICH. Pt underweight. SLP following, recommending pureed/thin liquids. Pt reportedly ate 100% of breakfast this morning and ate pretty well yesterday ~75% on average. Flow sheet indicates last BM 3/21. Believe this is in error. Likely last BM 4/1 at ~2:30 am per other entries in flow sheet. Labs reviewed. Patient meets criteria for Moderate Protein Calorie Malnutrition as evidenced by:   ASPEN Malnutrition Criteria  Acute Illness, Chronic Illness, or Social/Enviornmental: Chronic illness  Body Fat Loss: Severe  Muscle Mass Loss: Severe  ASPEN Malnutrition Score - Chronic Illness: 12  Chronic Illness - Malnutrition Diagnosis: Moderate malnutrition.           Estimated Nutrition Needs:   Kcals/day: 4380 Kcals/day(BMR(817x1.3)+250)  Protein: 58 g  Fluid: 1300 mL  Based On: [place] [place]  Weight Used: Actual wt(41.6 kg)    Pt expected to meet estimated nutrient needs:  [x]   Yes     []  No [] Unable to predict at this time  Nutrition Diagnosis:   1. Inadequate energy intake related to decreased ability to consume sufficient energy as evidenced by poor PO PTA?, weight loss, dementia leading to low PO      Goals:     Pt will consume >50% of meals and 1-2 ONS per day within 5-7 days with 1-2 lb weight gain      Monitoring & Evaluation:    - Total energy intake, Liquid meal replacement   - Weight/weight change   -      Previous Nutrition Goals Met:   N/A  Previous Recommendations:    N/A    Education & Discharge Needs:   Identified and addressed as needed   Participated in care plan, and/or interdisciplinary rounds    Nutrition Discharge Plan: Pureed diet, ONS 1-2 x per day for weight gain      Cultural, Confucianism and ethnic food preferences identified: None    Skin Integrity: [x]Intact  []Other  Edema: [x]none  []Other  Last BM: 4/1? Food Allergies: [x]None []Other  Diet Restrictions: Cultural/Yazdanism Preference(s): None     Anthropometrics: Wt Readings from Last 10 Encounters:   04/02/20 41.6 kg (91 lb 11.4 oz)   03/29/20 39.8 kg (87 lb 11.9 oz)   08/26/18 51.3 kg (113 lb)   03/23/18 51.3 kg (113 lb)   03/27/15 49.1 kg (108 lb 3.9 oz)   12/27/14 49.9 kg (110 lb)   04/18/13 52.8 kg (116 lb 6.4 oz)   07/01/12 53.1 kg (117 lb)   04/21/12 54 kg (119 lb)      Weight Loss Metrics 4/2/2020 3/29/2020 3/29/2020 3/29/2020 8/26/2018 3/23/2018 3/27/2015   Today's Wt 91 lb 11.4 oz - 87 lb 11.9 oz - 113 lb 113 lb 108 lb 3.9 oz   BMI - 15.26 kg/m2 - 17.14 kg/m2 22.07 kg/m2 21.35 kg/m2 21.14 kg/m2      Weight Source: Bed   ,    Body mass index is 15.26 kg/m².      IBW : 62.6 kg (138 lb),    Usual Body Weight: 49.9 kg (110 lb),      Labs:    Lab Results   Component Value Date/Time    Sodium 134 (L) 04/02/2020 12:40 AM    Potassium 3.8 04/02/2020 12:40 AM    Chloride 102 04/02/2020 12:40 AM    CO2 23 04/02/2020 12:40 AM    Glucose 109 (H) 04/02/2020 12:40 AM    BUN 16 04/02/2020 12:40 AM    Creatinine 1.31 (H) 04/02/2020 12:40 AM    Calcium 8.8 04/02/2020 12:40 AM    Magnesium 1.5 (L) 04/02/2020 12:40 AM    Phosphorus 2.5 (L) 04/02/2020 12:40 AM    Albumin 3.1 (L) 03/30/2020 02:49 AM     Lab Results   Component Value Date/Time    Hemoglobin A1c 5.5 03/30/2020 03:10 AM       Isabell Keita RD  Pager: [phone]

## 2020-04-02 NOTE — CDMP QUERY
Query 3 Patient admitted with [address]. Pt noted to have dysphagia, and has a BMI = 15.26 kg/m 2. If possible, please document in progress notes and discharge summary if you are evaluating and /or treating any of the following: ? Moderate protein calorie malnutrition ? Underweight with BMI = 15.26 kg/m 2 
? Other, please specify ? Clinically unable to determine The medical record reflects the following: 
 
  Risk Factors: Age, dementia, dysphagia, ICH, cerebral edema Clinical Indicators:  
- BMI = 15.26 kg/m 2 - RD PN 4/2: \"Patient meets criteria for Moderate Protein Calorie Malnutrition as evidenced by:  
ASPEN Malnutrition Criteria Chronic illness Body Fat Loss: Severe Muscle Mass Loss: Severe ASPEN Malnutrition Score - Chronic Illness: 12 
Chronic Illness - Malnutrition Diagnosis: Moderate malnutrition Treatment:  
- Registered Dietician,  
- Speech Language Pathologist consult - Nutritional supplements - Lab monitoring Thank You, 
   Tomasz Huang, [facility] 
   [phone]

## 2020-04-02 NOTE — PROGRESS NOTES
FUNCTIONAL STATUS PRIOR TO ADMISSION: Pt unable to provide much information. Pt reports she lives with her brother, nephew and her mother. Pt is 81 y/o. Anticipate pt's mother is decreased but unable to clarify information. HOME SUPPORT: unable to establish home support. Problem: Self Care Deficits Care Plan (Adult)  Goal: *Acute Goals and Plan of Care (Insert Text)  Description: Occupational Therapy Goals  Initiated: 4/1/2020   1. Patient will perform grooming with supervision/set-up sitting in chair within 7 day(s). 2.  Patient will perform bathing with min A from chair within 7 day(s). 3.  Patient will perform upper body dressing and lower body dressing with mod A within 7 day(s). 4.  Patient will perform toilet transfers with CGA within 7 day(s). 5.  Patient will perform all aspects of toileting with CGA within 7 day(s). 6.  Patient will initiate one ADL activities with independence within 7 days. 7.  Patient will participate in FM assess if indicated within 7 days. Outcome: Progressing Towards Goal     OCCUPATIONAL THERAPY TREATMENT  Patient: Allyson Zuniga (83 y.o. female)  Date: 4/2/2020  Diagnosis: ICH (intracerebral hemorrhage) ([facility] [I61.9]   ICH (intracerebral hemorrhage) ([facility]       Precautions: Fall(SBP <140)  Chart, occupational therapy assessment, plan of care, and goals were reviewed. ASSESSMENT  Patient continues with skilled OT services and is progressing towards goals. She demo'd slightly improved command following of 1-step, simple commands, but required up to mod facilitation, redirection, and repetition for task completion with brief mobility to the chair and seated grooming tasks. Decreased BUE shoulder ROM noted requiring min A for RUE facilitation with combing, patient perseverative with task and picking at lines/gowns frequently, therefore activity apron provided.  He BP is slightly elevated with brief mobility (see flowsheets), however SBP returns to <140 with brief seated rest breaks. Continue to recommend SNF rehab upon d/c. Current Level of Function Impacting Discharge (ADLs): Min-Mod A for upper body ADLs, Mod-Total A for lower body ADLs, Min-Mod A for mobility     Other factors to consider for discharge: fall risk, PLOF, PMH, physical assist for all mobility, baseline dementia         PLAN :  Patient continues to benefit from skilled intervention to address the above impairments. Continue treatment per established plan of care. to address goals. Recommend with staff: Recommend with nursing patient to complete as able in order to maintain strength, endurance and independence: ADLs with supervision/setup and OOB to chair 3x/day and mobilizing to the [facility] as appropriate for toileting with 1 assist if SBP <140. Thank you for your assistance. Recommendation for discharge: (in order for the patient to meet his/her long term goals)  Therapy up to 5 days/week in SNF setting    This discharge recommendation:  Has been made in collaboration with the attending provider and/or case management    IF patient discharges home will need the following DME: To be determined (TBD)         SUBJECTIVE:   Patient stated Oh jesus manuel, yes. Haha.     OBJECTIVE DATA SUMMARY:   Cognitive/Behavioral Status:  Neurologic State: Alert  Orientation Level: Oriented to person;Disoriented to place; Disoriented to situation;Disoriented to time  Cognition: Decreased attention/concentration;Decreased command following; Impaired decision making; Impulsive;Memory loss;Poor safety awareness  Perception: Appears intact  Perseveration: Perseverates during ADLS(with hairbrush)  Safety/Judgement: Decreased awareness of environment;Decreased awareness of need for assistance;Decreased awareness of need for safety; Lack of insight into deficits    Functional Mobility and Transfers for ADLs:  Bed Mobility:  Supine to Sit: Supervision  Sit to Supine: (in chair)  Scooting: Stand-by assistance    Transfers:  Sit to Stand: Minimum assistance; Moderate assistance;Assist x1(sukhjinder HHA)     Bed to Chair: Minimum assistance; Moderate assistance;Assist x1(sukhjinder HHA)    Balance:  Sitting: Intact; Without support  Standing: Impaired; With support(sukhjinder HHA vs RW)  Standing - Static: Fair;Constant support  Standing - Dynamic : Fair;Constant support    ADL Intervention:       Grooming  Grooming Assistance: Minimum assistance  Position Performed: Seated in chair  Brushing/Combing Hair: Minimum assistance(A for elbow prop for shoulder abduction, comb in hand)  Cues: Physical assistance; Tactile cues provided;Verbal cues provided;Visual cues provided                   Lower Body Dressing Assistance  Dressing Assistance: Minimum assistance  Socks: Minimum assistance; Compensatory technique training  Leg Crossed Method Used: No  Position Performed: Bending forward method; Long sitting on bed  Cues: Verbal cues provided;Visual cues provided; Tactile cues provided;Physical assistance    Toileting  Toileting Assistance: Total assistance(dependent)(brief, incontinent)    Cognitive Retraining  Safety/Judgement: Decreased awareness of environment;Decreased awareness of need for assistance;Decreased awareness of need for safety; Lack of insight into deficits    Therapeutic Exercises:   Bed>chair with Min-Mod A x1 and sukhjinder HHA    Pain:  None noted    Activity Tolerance:   Fair and requires rest breaks  Please refer to the flowsheet for vital signs taken during this treatment. After treatment patient left in no apparent distress:   Sitting in chair, Call bell within reach, and Bed / chair alarm activated    COMMUNICATION/COLLABORATION:   The patients plan of care was discussed with: Physical therapist and Registered nurse.      ADIEL Wynn, OTR/L  Time Calculation: 29 mins

## 2020-04-03 VITALS
HEART RATE: 66 BPM | TEMPERATURE: 97.5 F | BODY MASS INDEX: 15.81 KG/M2 | SYSTOLIC BLOOD PRESSURE: 147 MMHG | WEIGHT: 95.02 LBS | DIASTOLIC BLOOD PRESSURE: 67 MMHG | RESPIRATION RATE: 18 BRPM | OXYGEN SATURATION: 100 %

## 2020-04-03 LAB
ANION GAP SERPL CALC-SCNC: 7 MMOL/L (ref 5–15)
BASOPHILS # BLD: 0.1 K/UL (ref 0–0.1)
BASOPHILS NFR BLD: 1 % (ref 0–1)
BUN SERPL-MCNC: 16 MG/DL (ref 6–20)
BUN/CREAT SERPL: 16 (ref 12–20)
CALCIUM SERPL-MCNC: 9.3 MG/DL (ref 8.5–10.1)
CHLORIDE SERPL-SCNC: 107 MMOL/L (ref 97–108)
CO2 SERPL-SCNC: 22 MMOL/L (ref 21–32)
CREAT SERPL-MCNC: 0.99 MG/DL (ref 0.55–1.02)
DIFFERENTIAL METHOD BLD: ABNORMAL
EOSINOPHIL # BLD: 1.7 K/UL (ref 0–0.4)
EOSINOPHIL NFR BLD: 28 % (ref 0–7)
ERYTHROCYTE [DISTWIDTH] IN BLOOD BY AUTOMATED COUNT: 15.5 % (ref 11.5–14.5)
GLUCOSE BLD STRIP.AUTO-MCNC: 239 MG/DL (ref 65–100)
GLUCOSE BLD STRIP.AUTO-MCNC: 91 MG/DL (ref 65–100)
GLUCOSE BLD STRIP.AUTO-MCNC: 98 MG/DL (ref 65–100)
GLUCOSE SERPL-MCNC: 91 MG/DL (ref 65–100)
HCT VFR BLD AUTO: 36.2 % (ref 35–47)
HGB BLD-MCNC: 11.4 G/DL (ref 11.5–16)
IMM GRANULOCYTES # BLD AUTO: 0 K/UL (ref 0–0.04)
IMM GRANULOCYTES NFR BLD AUTO: 0 % (ref 0–0.5)
LYMPHOCYTES # BLD: 1.7 K/UL (ref 0.8–3.5)
LYMPHOCYTES NFR BLD: 28 % (ref 12–49)
MAGNESIUM SERPL-MCNC: 1.8 MG/DL (ref 1.6–2.4)
MCH RBC QN AUTO: 22.4 PG (ref 26–34)
MCHC RBC AUTO-ENTMCNC: 31.5 G/DL (ref 30–36.5)
MCV RBC AUTO: 71 FL (ref 80–99)
MONOCYTES # BLD: 0.6 K/UL (ref 0–1)
MONOCYTES NFR BLD: 11 % (ref 5–13)
NEUTS SEG # BLD: 1.8 K/UL (ref 1.8–8)
NEUTS SEG NFR BLD: 32 % (ref 32–75)
NRBC # BLD: 0 K/UL (ref 0–0.01)
NRBC BLD-RTO: 0 PER 100 WBC
PHOSPHATE SERPL-MCNC: 2.9 MG/DL (ref 2.6–4.7)
PLATELET # BLD AUTO: 166 K/UL (ref 150–400)
PLATELET COMMENTS,PCOM: ABNORMAL
PMV BLD AUTO: ABNORMAL FL (ref 8.9–12.9)
POTASSIUM SERPL-SCNC: 3.8 MMOL/L (ref 3.5–5.1)
RBC # BLD AUTO: 5.1 M/UL (ref 3.8–5.2)
RBC MORPH BLD: ABNORMAL
SERVICE CMNT-IMP: ABNORMAL
SERVICE CMNT-IMP: NORMAL
SERVICE CMNT-IMP: NORMAL
SODIUM SERPL-SCNC: 136 MMOL/L (ref 136–145)
WBC # BLD AUTO: 5.9 K/UL (ref 3.6–11)

## 2020-04-03 PROCEDURE — 97530 THERAPEUTIC ACTIVITIES: CPT | Performed by: PHYSICAL THERAPIST

## 2020-04-03 PROCEDURE — 83735 ASSAY OF MAGNESIUM: CPT

## 2020-04-03 PROCEDURE — 85025 COMPLETE CBC W/AUTO DIFF WBC: CPT

## 2020-04-03 PROCEDURE — 74011250637 HC RX REV CODE- 250/637: Performed by: NURSE PRACTITIONER

## 2020-04-03 PROCEDURE — 82962 GLUCOSE BLOOD TEST: CPT

## 2020-04-03 PROCEDURE — 80048 BASIC METABOLIC PNL TOTAL CA: CPT

## 2020-04-03 PROCEDURE — 74011250637 HC RX REV CODE- 250/637: Performed by: HOSPITALIST

## 2020-04-03 PROCEDURE — 92526 ORAL FUNCTION THERAPY: CPT | Performed by: SPEECH-LANGUAGE PATHOLOGIST

## 2020-04-03 PROCEDURE — 36415 COLL VENOUS BLD VENIPUNCTURE: CPT

## 2020-04-03 PROCEDURE — 84100 ASSAY OF PHOSPHORUS: CPT

## 2020-04-03 PROCEDURE — 97116 GAIT TRAINING THERAPY: CPT | Performed by: PHYSICAL THERAPIST

## 2020-04-03 RX ORDER — IPRATROPIUM BROMIDE AND ALBUTEROL SULFATE 2.5; .5 MG/3ML; MG/3ML
3 SOLUTION RESPIRATORY (INHALATION)
Status: DISCONTINUED | OUTPATIENT
Start: 2020-04-03 | End: 2020-04-03 | Stop reason: HOSPADM

## 2020-04-03 RX ORDER — AMLODIPINE BESYLATE 5 MG/1
5 TABLET ORAL DAILY
Qty: 30 TAB | Refills: 1 | Status: SHIPPED | OUTPATIENT
Start: 2020-04-04 | End: 2020-04-03 | Stop reason: SDUPTHER

## 2020-04-03 RX ORDER — AMLODIPINE BESYLATE 5 MG/1
5 TABLET ORAL ONCE
Status: COMPLETED | OUTPATIENT
Start: 2020-04-03 | End: 2020-04-03

## 2020-04-03 RX ORDER — AMLODIPINE BESYLATE 5 MG/1
5 TABLET ORAL DAILY
Status: DISCONTINUED | OUTPATIENT
Start: 2020-04-04 | End: 2020-04-03

## 2020-04-03 RX ORDER — POLYETHYLENE GLYCOL 3350 17 G/17G
17 POWDER, FOR SOLUTION ORAL
Qty: 30 PACKET | Refills: 0 | Status: SHIPPED | OUTPATIENT
Start: 2020-04-03

## 2020-04-03 RX ORDER — IBUPROFEN 200 MG
1 TABLET ORAL DAILY
Qty: 30 PATCH | Refills: 0 | Status: SHIPPED | OUTPATIENT
Start: 2020-04-04 | End: 2020-05-04

## 2020-04-03 RX ORDER — AMLODIPINE BESYLATE 5 MG/1
5 TABLET ORAL DAILY
Status: DISCONTINUED | OUTPATIENT
Start: 2020-04-03 | End: 2020-04-03 | Stop reason: HOSPADM

## 2020-04-03 RX ORDER — CLONIDINE 0.1 MG/24H
1 PATCH, EXTENDED RELEASE TRANSDERMAL
Qty: 4 PATCH | Refills: 1 | Status: SHIPPED | OUTPATIENT
Start: 2020-04-09

## 2020-04-03 RX ORDER — AMLODIPINE BESYLATE 5 MG/1
10 TABLET ORAL DAILY
Qty: 60 TAB | Refills: 1 | Status: SHIPPED | OUTPATIENT
Start: 2020-04-04 | End: 2021-01-01

## 2020-04-03 RX ORDER — SENNOSIDES 8.8 MG/5ML
5 LIQUID ORAL
Status: DISCONTINUED | OUTPATIENT
Start: 2020-04-03 | End: 2020-04-03 | Stop reason: HOSPADM

## 2020-04-03 RX ADMIN — LABETALOL HYDROCHLORIDE 20 MG: 5 INJECTION INTRAVENOUS at 09:50

## 2020-04-03 RX ADMIN — AMLODIPINE BESYLATE 5 MG: 5 TABLET ORAL at 10:52

## 2020-04-03 RX ADMIN — LABETALOL HYDROCHLORIDE 20 MG: 5 INJECTION INTRAVENOUS at 06:14

## 2020-04-03 RX ADMIN — AMLODIPINE BESYLATE 5 MG: 5 TABLET ORAL at 14:24

## 2020-04-03 RX ADMIN — POLYETHYLENE GLYCOL 3350 17 G: 17 POWDER, FOR SOLUTION ORAL at 09:51

## 2020-04-03 RX ADMIN — Medication 10 ML: at 06:29

## 2020-04-03 NOTE — PROGRESS NOTES
[facility] Adult  Hospitalist Group                                                                                          Hospitalist Progress Note  Leno Reynoso MD  Answering service: [phone] from in house phone        Date of Service:  4/3/2020  NAME:  Coty Pittman  :  1925  MRN:  [id number]      Admission Summary:    22-year-old female seen and examined today by critical care services due to initial complaints of altered mental status. Patient has past medical history for bronchitis, hypertension only. According to emergency room physician patient had initial complaints of fatigue and her son called EMS due to patient's lack of energy. Patient was altered and did not know where she was at. Patient stated she did have a cough with abdominal pain and weakness associated. Patient initial temperature 100 °F.  No productive cough. Neurosurgery has been consulted for her acute intracranial hemorrhage with subdural hematoma extension. Plan is to continue neurochecks. Obtain respiratory viral panel to rule out any influenza. We will continue patient on Cardene drip for her life-threatening hypertensive emergency. Plan is to follow neurosurgery and neurology recommendations for her life-threatening intercerebral hemorrhage. Continue neurochecks. Transferred out of ICU on 3/31    Interval history / Subjective:      Follow up  [address]  Cleared by SLP to have pureed diet  Still confused     Assessment & Plan:     Right temporal ICH  -No surgical intervention  -Neurology, NS consulted appreciate input  -PT/OT/speech rehab vs home health  -I feel that in the current scenario the patient would be better off at home rather than at rehab/SNF     Cerebral edema  -due to above  -steroids not indicated  -plans as above per Neuro and NS     Dementia  -Unclear baseline     Dysphagia  -failed swallow  -MBS  when more awake  -D5 1/2 NS @ 50mL/hr while NPO    Hypokalemia  Replace and retreat     HTN Emergency  -off Cardene  -Goal SBP<140  -Pt unable to take PO; continue labetolol PRN  -Start Clonidine patch     COPD: Chronic bronchitis  -Stable  -Afeb, no leukocytosis  -No oxygen requirements  -Continue PRN nebs  -Nicotine patch (smoker)     Suspected UTI  -ruled out; UA negative    Moderate protein calorie malnutrition  -nutrition consult    PT/OT rehab/SNF vs home health        DVTppx: SCDs  Gippx: Famotidine  Diet: Pureed diet  Code Status: Full, appreciate palliative care    Plan: Discharge planning    Activity: w/ assist; fall risk  Discharge: Pending       Hospital Problems  Date Reviewed: 12/29/2014          Codes Class Noted POA    COPD (chronic obstructive pulmonary disease) ([facility] ICD-10-CM: J44.9  ICD-9-CM: 910  3/31/2020 Yes        Dementia ([facility] ICD-10-CM: F03.90  ICD-9-CM: 294.20  Unknown Yes        * (Principal) ICH (intracerebral hemorrhage) ([facility] ICD-10-CM: I61.9  ICD-9-CM: 522  3/29/2020 Unknown        Acute bronchitis ICD-10-CM: J20.9  ICD-9-CM: 466.0  12/27/2014 Yes        HTN (hypertension) ICD-10-CM: I10  ICD-9-CM: 401.9  12/27/2014 Yes                Review of Systems:   Review of systems not obtained due to patient factors. Vital Signs:    Last 24hrs VS reviewed since prior progress note. Most recent are:  Visit Vitals  /53   Pulse 60   Temp 98.2 °F (36.8 °C)   Resp 17   Wt 43.1 kg (95 lb 0.3 oz)   SpO2 100%   BMI 15.81 kg/m²       No intake or output data in the 24 hours ending 04/03/20 6190     Physical Examination:             Constitutional:  No acute distress, very less reponse   ENT:  Oral mucosa moist, oropharynx benign. Resp:  CTA bilaterally. No wheezing/rhonchi/rales. No accessory muscle use   CV:  Regular rhythm, normal rate, no murmurs, gallops, rubs    GI:  Soft, non distended, non tender.  normoactive bowel sounds, no hepatosplenomegaly     Musculoskeletal:  No edema, warm, 2+ pulses throughout    Neurologic:  difficult to assess, very less response           Data Review:    Review and/or order of clinical lab test      Labs:     Recent Labs     04/03/20 0220 04/02/20  0040   WBC 5.9 5.9   HGB 11.4* 11.6   HCT 36.2 36.9    160     Recent Labs     04/03/20 0220 04/02/20 0040 04/01/20 0119    134* 134*   K 3.8 3.8 3.2*    102 102   CO2 22 23 23   BUN 16 16 10   CREA 0.99 1.31* 1.01   GLU 91 109* 95   CA 9.3 8.8 8.8   MG 1.8 1.5* 1.4*   PHOS 2.9 2.5* 2.4*     No results for input(s): SGOT, GPT, ALT, AP, TBIL, TBILI, TP, ALB, GLOB, GGT, AML, LPSE in the last 72 hours. No lab exists for component: AMYP, HLPSE  Recent Labs     04/01/20 0119   INR 1.0   PTP 10.5      No results for input(s): FE, TIBC, PSAT, FERR in the last 72 hours. No results found for: FOL, RBCF   No results for input(s): PH, PCO2, PO2 in the last 72 hours. No results for input(s): CPK, CKNDX, TROIQ in the last 72 hours.     No lab exists for component: CPKMB  Lab Results   Component Value Date/Time    Cholesterol, total 158 03/30/2020 02:49 AM    HDL Cholesterol 54 03/30/2020 02:49 AM    LDL, calculated 87.2 03/30/2020 02:49 AM    Triglyceride 84 03/30/2020 02:49 AM    CHOL/HDL Ratio 2.9 03/30/2020 02:49 AM     Lab Results   Component Value Date/Time    Glucose (POC) 98 04/03/2020 06:37 AM    Glucose (POC) 91 04/03/2020 02:05 AM    Glucose (POC) 128 (H) 04/02/2020 04:59 PM    Glucose (POC) 93 04/02/2020 11:19 AM    Glucose (POC) 74 04/02/2020 06:18 AM     Lab Results   Component Value Date/Time    Color YELLOW/STRAW 03/29/2020 07:54 PM    Appearance CLEAR 03/29/2020 07:54 PM    Specific gravity 1.015 03/29/2020 07:54 PM    Specific gravity 1.025 08/26/2018 04:38 PM    pH (UA) 7.0 03/29/2020 07:54 PM    Protein NEGATIVE  03/29/2020 07:54 PM    Glucose NEGATIVE  03/29/2020 07:54 PM    Ketone NEGATIVE  03/29/2020 07:54 PM    Bilirubin NEGATIVE  03/29/2020 07:54 PM    Urobilinogen 1.0 03/29/2020 07:54 PM    Nitrites NEGATIVE  03/29/2020 07:54 PM    Leukocyte Esterase NEGATIVE  03/29/2020 07:54 PM    Epithelial cells FEW 03/29/2020 07:54 PM    Bacteria NEGATIVE  03/29/2020 07:54 PM    WBC 0-4 03/29/2020 07:54 PM    RBC 0-5 03/29/2020 07:54 PM         Medications Reviewed:     Current Facility-Administered Medications   Medication Dose Route Frequency    polyethylene glycol (MIRALAX) packet 17 g  17 g Oral BID    cloNIDine (CATAPRES) 0.1 mg/24 hr patch 1 Patch  1 Patch TransDERmal Q7D    labetaloL (NORMODYNE;TRANDATE) injection 20 mg  20 mg IntraVENous Q2H PRN    haloperidol lactate (HALDOL) injection 2 mg  2 mg IntraVENous Q4H PRN    dextrose 5 % - 0.45% NaCl infusion  50 mL/hr IntraVENous CONTINUOUS    glucose chewable tablet 16 g  4 Tab Oral PRN    glucagon (GLUCAGEN) injection 1 mg  1 mg IntraMUSCular PRN    dextrose 10% infusion 0-250 mL  0-250 mL IntraVENous PRN    nicotine (NICODERM CQ) 14 mg/24 hr patch 1 Patch  1 Patch TransDERmal DAILY    sodium chloride (NS) flush 5-40 mL  5-40 mL IntraVENous Q8H    sodium chloride (NS) flush 5-40 mL  5-40 mL IntraVENous PRN    acetaminophen (TYLENOL) tablet 650 mg  650 mg Oral Q4H PRN    ondansetron (ZOFRAN) injection 4 mg  4 mg IntraVENous Q4H PRN    famotidine (PF) (PEPCID) 20 mg in 0.9% sodium chloride 10 mL injection  20 mg IntraVENous Q24H    albuterol (PROVENTIL VENTOLIN) nebulizer solution 2.5 mg  2.5 mg Nebulization Q4H PRN     ______________________________________________________________________  EXPECTED LENGTH OF STAY: 4d 9h  ACTUAL LENGTH OF STAY:          Samuel Tejada MD

## 2020-04-03 NOTE — PROGRESS NOTES
Transition of Care Plan/Discharge Note      Disposition: Home with Home Health and family assistance; MELYSSA Zacarias 105 to follow     RUR- 16 % Low Risks    Transport: family-son Apryl Guajardo [phone] @ 1:30 pm    Follow up: PCP/Specialist(s)     The Plan for Transition of Care is related to the following treatment goals: Home Health     The Patient and/or patient representative aurora Guajardo [phone] was provided with a choice of provider and agrees   with the discharge plan. [x] Yes [] No    Freedom of choice list was provided with basic dialogue that supports the patient's individualized plan of care/goals, treatment preferences and shares the quality data associated with the providers. [x] Yes [] No    A freedom of choice was provided, verbal consent provided by the aurora Guajardo [phone], placed on the chart for record. A referral was sent to [facility] via all scripts.        ELLIS Jason

## 2020-04-03 NOTE — PROGRESS NOTES
Hospital follow-up new PCP transitional care appointment has been scheduled with Dr. Berenice Rodriguez for Thursday, 4/9/20 at 11:00 a.m. Pending patient discharge.   [place] Good, Care Management Specialist.

## 2020-04-03 NOTE — PROGRESS NOTES
Problem: Falls - Risk of  Goal: *Absence of Falls  Description: Document Reema Dimitry Fall Risk and appropriate interventions in the flowsheet. Outcome: Progressing Towards Goal  Note: Fall Risk Interventions:  Mobility Interventions: Communicate number of staff needed for ambulation/transfer, Bed/chair exit alarm, Patient to call before getting OOB    Mentation Interventions: Bed/chair exit alarm, Evaluate medications/consider consulting pharmacy, More frequent rounding    Medication Interventions: Bed/chair exit alarm, Evaluate medications/consider consulting pharmacy, Patient to call before getting OOB    Elimination Interventions: Stay With Me (per policy), Patient to call for help with toileting needs, Toileting schedule/hourly rounds    History of Falls Interventions: Bed/chair exit alarm, Evaluate medications/consider consulting pharmacy         Problem: Patient Education: Go to Patient Education Activity  Goal: Patient/Family Education  Outcome: Progressing Towards Goal     Problem: Pressure Injury - Risk of  Goal: *Prevention of pressure injury  Description: Document Isidro Scale and appropriate interventions in the flowsheet. Outcome: Progressing Towards Goal  Note: Pressure Injury Interventions:  Sensory Interventions: Assess changes in LOC, Minimize linen layers, Maintain/enhance activity level, Keep linens dry and wrinkle-free, Monitor skin under medical devices, Turn and reposition approx. every two hours (pillows and wedges if needed)    Moisture Interventions: Check for incontinence Q2 hours and as needed, Apply protective barrier, creams and emollients, Absorbent underpads, Maintain skin hydration (lotion/cream), Minimize layers    Activity Interventions: Increase time out of bed, Pressure redistribution bed/mattress(bed type), PT/OT evaluation    Mobility Interventions: PT/OT evaluation, Pressure redistribution bed/mattress (bed type), Turn and reposition approx.  every two hours(pillow and wedges)    Nutrition Interventions: Document food/fluid/supplement intake    Friction and Shear Interventions: Minimize layers                Problem: Patient Education: Go to Patient Education Activity  Goal: Patient/Family Education  Outcome: Progressing Towards Goal     Problem: Hemorrhagic Stroke: Day 5 through Discharge  Goal: Off Pathway (Use only if patient is Off Pathway)  Outcome: Progressing Towards Goal  Goal: Activity/Safety  Outcome: Progressing Towards Goal  Goal: Consults, if ordered  Outcome: Progressing Towards Goal  Goal: Diagnostic Test/Procedures  Outcome: Progressing Towards Goal  Goal: Nutrition/Diet  Outcome: Progressing Towards Goal  Goal: Medications  Outcome: Progressing Towards Goal  Goal: Respiratory  Outcome: Progressing Towards Goal

## 2020-04-03 NOTE — DISCHARGE INSTRUCTIONS
Discharge Instructions       PATIENT ID: Anupama Browne  MRN: [id number]   YOB: 1925    DATE OF ADMISSION: 3/29/2020 10:23 PM    DATE OF DISCHARGE: 4/3/2020    PRIMARY CARE PROVIDER: Halle Barragan MD     ATTENDING PHYSICIAN: Candi Lei MD  DISCHARGING PROVIDER: Nathen Lew MD    To contact this individual call [phone] and ask the  to page. If unavailable ask to be transferred the Adult Hospitalist Department. DISCHARGE DIAGNOSES   ICH    CONSULTATIONS: IP CONSULT TO NEUROLOGY  IP CONSULT TO PALLIATIVE CARE - PROVIDER  IP CONSULT TO NEUROSURGERY    PROCEDURES/SURGERIES: * No surgery found *    PENDING TEST RESULTS:   At the time of discharge the following test results are still pending: none    FOLLOW UP APPOINTMENTS:   Follow-up Information     Follow up With Specialties Details Why Contact Info    PCP  In 1 week      Katina Rodas MD Neurology In 2 weeks  [facility] [address]  [phone]             ADDITIONAL CARE RECOMMENDATIONS:   Follow up with PMD  Follow up with Neurology  BP checks twice daily. If BP>160/100 please call your PMD  Aspiration precautions    DIET: Pureed Diet    ACTIVITY: Activity as tolerated and as recommended by PT/OT      DISCHARGE MEDICATIONS:   See Medication Reconciliation Form    · It is important that you take the medication exactly as they are prescribed. · Keep your medication in the bottles provided by the pharmacist and keep a list of the medication names, dosages, and times to be taken in your wallet. · Do not take other medications without consulting your doctor. NOTIFY YOUR PHYSICIAN FOR ANY OF THE FOLLOWING:   Fever over 101 degrees for 24 hours. Chest pain, shortness of breath, fever, chills, nausea, vomiting, diarrhea, change in mentation, falling, weakness, bleeding. Severe pain or pain not relieved by medications.   Or, any other signs or symptoms that you may have questions about.      DISPOSITION:  x  Home With:   OT  PT  HH  RN       SNF/Inpatient Rehab/LTAC    Independent/assisted living    Hospice    Other:     CDMP Checked:   Yes x     PROBLEM LIST Updated:  Yes x       Signed:   Shey Ramires MD  4/3/2020  11:44 AM

## 2020-04-03 NOTE — PROGRESS NOTES
Problem: Dysphagia (Adult)  Goal: *Acute Goals and Plan of Care (Insert Text)  Description: Speech therapy goals  Initiated 4/1/2020   1. Patient will tolerate puree/thin liquid diet without s/s of aspiration within 7 days   2. Patient will tolerate solid trials with SLP to determine safety for diet upgrade within 7 days   Initiated 3/31/2020   1. Patient will participate in re-evaluation of swallow function within 7 days MET 4/1/2020       Outcome: Progressing Towards Goal    SPEECH LANGUAGE PATHOLOGY DYSPHAGIA TREATMENT  Patient: Janneth Rodriguez (24 y.o. female)  Date: 4/3/2020  Diagnosis: ICH (intracerebral hemorrhage) ([facility] [I61.9]   ICH (intracerebral hemorrhage) ([facility]       Precautions:  Fall, Bed Alarm(SBP<140)    ASSESSMENT:  Patient with excellent tolerance of puree/thin liquid diet with entire breakfast tray consumed. Moderate oral dysphagia for solids with slow and incomplete mastication with patient forgetting she had solids in her mouth in the middle of chewing - she stopped chewing and began to fold her napkin/blanket in her lap and needed verbal cues to recall cracker was in her mouth. Her mental status and variability of mental status continue to increase her risks for aspiration and suspect she will need much improved mental status  before she will be ready for diet upgrade. PLAN:  Recommendations and Planned Interventions:  Continue puree/thin liquid diet. Will follow for re-assessment of solids as mental status indicates. Patient continues to benefit from skilled intervention to address the above impairments. Continue treatment per established plan of care. Discharge Recommendations: To Be Determined     SUBJECTIVE:   Patient stated ok. When asked to take a drink but then just stared at the straw and needed repeated cues to actually drink. Does better when self-feeding at her own pace, though initiation is limited.      OBJECTIVE:   Cognitive and Communication Status:  Neurologic State: Alert, Confused  Orientation Level: Oriented to person, Disoriented to time, Disoriented to situation, Disoriented to place  Cognition: Decreased command following, Decreased attention/concentration, Memory loss  Perception: Appears intact  Perseveration: Perseverates during ADLS  Safety/Judgement: Decreased awareness of environment, Decreased awareness of need for assistance, Decreased awareness of need for safety  Dysphagia Treatment:  Oral Assessment:     P.O. Trials:  Patient Position: upright in chair   Vocal quality prior to P.O.: No impairment  Consistency Presented: Thin liquid;Puree; Solid  How Presented: SLP-fed/presented;Self-fed/presented;Cup/sip;Spoon;Straw;Successive swallows     Bolus Acceptance: Impaired(inconsistent recognition of solids )  Bolus Formation/Control: Impaired  Type of Impairment: Incomplete;Mastication(forgets she has solids in her mouth in the middle of chewing)  Propulsion: Delayed (# of seconds)  Oral Residue: None  Initiation of Swallow: No impairment  Laryngeal Elevation: Functional  Aspiration Signs/Symptoms: None  Pharyngeal Phase Characteristics: No impairment, issues, or problems              Oral Phase Severity: Moderate  Pharyngeal Phase Severity : No impairment  Exercises:  Laryngeal Exercises:                                                                                                                                   Pain:  Pain Scale 1: Numeric (0 - 10)  Pain Intensity 1: 0       After treatment:   Patient left in no apparent distress sitting up in chair, Call bell within reach, and Nursing notified    COMMUNICATION/EDUCATION:   Patient was educated regarding her deficit(s) of dysphagia as this relates to her diagnosis of ICH. She demonstrated Poor understanding as evidenced by no response to education. The patient's plan of care including recommendations, planned interventions, and recommended diet changes were discussed with: Registered nurse.      Hernandez Bella Placido Landon M.CD.  CCC-SLP   Time Calculation: 8 mins

## 2020-04-03 NOTE — PROGRESS NOTES
[facility] Adult  Hospitalist Group                                                                                          Hospitalist Progress Note  Lewis Love MD  Answering service: [phone] from in house phone        Date of Service:  4/3/2020  NAME:  Cosme Seay  :  1925  MRN:  [id number]      Admission Summary:    51-year-old female seen and examined today by critical care services due to initial complaints of altered mental status. Patient has past medical history for bronchitis, hypertension only. According to emergency room physician patient had initial complaints of fatigue and her son called EMS due to patient's lack of energy. Patient was altered and did not know where she was at. Patient stated she did have a cough with abdominal pain and weakness associated. Patient initial temperature 100 °F.  No productive cough. Neurosurgery has been consulted for her acute intracranial hemorrhage with subdural hematoma extension. Plan is to continue neurochecks. Obtain respiratory viral panel to rule out any influenza. We will continue patient on Cardene drip for her life-threatening hypertensive emergency. Plan is to follow neurosurgery and neurology recommendations for her life-threatening intercerebral hemorrhage. Continue neurochecks. Transferred out of ICU on 3/31    Interval history / Subjective: Follow up  [address]  Continues to remain stable  Still confused, seems close to baseline     Assessment & Plan:     Right temporal ICH  -No surgical intervention  -Neurology, NS consulted appreciate input  -PT/OT/speech rehab vs home health  -I feel that in the current scenario the patient would be better off at home rather than at rehab/SNF. Spoke to the son, agrees that he would also like her to come home with home health.     AMS sec to Hypertensive encephalopathy  -now improved     Cerebral edema  -due to above  -steroids not indicated  -plans as above per Neuro and NS     Dementia  -Unclear baseline     Dysphagia  -now passed swallow eval  -On Pureed diet    Hypokalemia  Replace and retreat     HTN Emergency  -off Cardene  -Goal SBP<140  -Pt unable to take PO; continue labetolol PRN  -Started Clonidine patch, will add norvasc     COPD: Chronic bronchitis  -Stable  -Afeb, no leukocytosis  -No oxygen requirements  -Continue PRN nebs  -Nicotine patch (smoker)     Suspected UTI  -ruled out; UA negative    Moderate protein calorie malnutrition  -nutrition consult    PT/OT rehab/SNF vs home health        DVTppx: SCDs  Gippx: Famotidine  Diet: Pureed diet  Code Status: Full, appreciate palliative care    Plan: Discharge today    Activity: w/ assist; fall risk  Discharge: today to home       Hospital Problems  Date Reviewed: 12/29/2014          Codes Class Noted POA    COPD (chronic obstructive pulmonary disease) ([facility] ICD-10-CM: J44.9  ICD-9-CM: 365  3/31/2020 Yes        Dementia ([facility] ICD-10-CM: F03.90  ICD-9-CM: 294.20  Unknown Yes        * (Principal) ICH (intracerebral hemorrhage) ([facility] ICD-10-CM: I61.9  ICD-9-CM: 854  3/29/2020 Unknown        Acute bronchitis ICD-10-CM: J20.9  ICD-9-CM: 466.0  12/27/2014 Yes        HTN (hypertension) ICD-10-CM: I10  ICD-9-CM: 401.9  12/27/2014 Yes                Review of Systems:   Review of systems not obtained due to patient factors. Vital Signs:    Last 24hrs VS reviewed since prior progress note. Most recent are:  Visit Vitals  /55   Pulse 65   Temp 97.9 °F (36.6 °C)   Resp 20   Wt 43.1 kg (95 lb 0.3 oz)   SpO2 100%   BMI 15.81 kg/m²       No intake or output data in the 24 hours ending 04/03/20 1139     Physical Examination:             Constitutional:  No acute distress, very less reponse   ENT:  Oral mucosa moist, oropharynx benign. Resp:  CTA bilaterally. No wheezing/rhonchi/rales. No accessory muscle use   CV:  Regular rhythm, normal rate, no murmurs, gallops, rubs    GI:  Soft, non distended, non tender.  normoactive bowel sounds, no hepatosplenomegaly     Musculoskeletal:  No edema, warm, 2+ pulses throughout    Neurologic:  difficult to assess, very less response           Data Review:    Review and/or order of clinical lab test      Labs:     Recent Labs     04/03/20 0220 04/02/20 0040   WBC 5.9 5.9   HGB 11.4* 11.6   HCT 36.2 36.9    160     Recent Labs     04/03/20 0220 04/02/20 0040 04/01/20 0119    134* 134*   K 3.8 3.8 3.2*    102 102   CO2 22 23 23   BUN 16 16 10   CREA 0.99 1.31* 1.01   GLU 91 109* 95   CA 9.3 8.8 8.8   MG 1.8 1.5* 1.4*   PHOS 2.9 2.5* 2.4*     No results for input(s): SGOT, GPT, ALT, AP, TBIL, TBILI, TP, ALB, GLOB, GGT, AML, LPSE in the last 72 hours. No lab exists for component: AMYP, HLPSE  Recent Labs     04/01/20 0119   INR 1.0   PTP 10.5      No results for input(s): FE, TIBC, PSAT, FERR in the last 72 hours. No results found for: FOL, RBCF   No results for input(s): PH, PCO2, PO2 in the last 72 hours. No results for input(s): CPK, CKNDX, TROIQ in the last 72 hours.     No lab exists for component: CPKMB  Lab Results   Component Value Date/Time    Cholesterol, total 158 03/30/2020 02:49 AM    HDL Cholesterol 54 03/30/2020 02:49 AM    LDL, calculated 87.2 03/30/2020 02:49 AM    Triglyceride 84 03/30/2020 02:49 AM    CHOL/HDL Ratio 2.9 03/30/2020 02:49 AM     Lab Results   Component Value Date/Time    Glucose (POC) 98 04/03/2020 06:37 AM    Glucose (POC) 91 04/03/2020 02:05 AM    Glucose (POC) 128 (H) 04/02/2020 04:59 PM    Glucose (POC) 93 04/02/2020 11:19 AM    Glucose (POC) 74 04/02/2020 06:18 AM     Lab Results   Component Value Date/Time    Color YELLOW/STRAW 03/29/2020 07:54 PM    Appearance CLEAR 03/29/2020 07:54 PM    Specific gravity 1.015 03/29/2020 07:54 PM    Specific gravity 1.025 08/26/2018 04:38 PM    pH (UA) 7.0 03/29/2020 07:54 PM    Protein NEGATIVE  03/29/2020 07:54 PM    Glucose NEGATIVE  03/29/2020 07:54 PM    Ketone NEGATIVE  03/29/2020 07:54 PM Bilirubin NEGATIVE  03/29/2020 07:54 PM    Urobilinogen 1.0 03/29/2020 07:54 PM    Nitrites NEGATIVE  03/29/2020 07:54 PM    Leukocyte Esterase NEGATIVE  03/29/2020 07:54 PM    Epithelial cells FEW 03/29/2020 07:54 PM    Bacteria NEGATIVE  03/29/2020 07:54 PM    WBC 0-4 03/29/2020 07:54 PM    RBC 0-5 03/29/2020 07:54 PM         Medications Reviewed:     Current Facility-Administered Medications   Medication Dose Route Frequency    sennosides (SENOKOT) 8.8 mg/5 mL syrup 8.8 mg  5 mL Oral QHS PRN    albuterol-ipratropium (DUO-NEB) 2.5 MG-0.5 MG/3 ML  3 mL Nebulization Q4H PRN    amLODIPine (NORVASC) tablet 5 mg  5 mg Oral DAILY    polyethylene glycol (MIRALAX) packet 17 g  17 g Oral BID    cloNIDine (CATAPRES) 0.1 mg/24 hr patch 1 Patch  1 Patch TransDERmal Q7D    labetaloL (NORMODYNE;TRANDATE) injection 20 mg  20 mg IntraVENous Q2H PRN    haloperidol lactate (HALDOL) injection 2 mg  2 mg IntraVENous Q4H PRN    dextrose 5 % - 0.45% NaCl infusion  50 mL/hr IntraVENous CONTINUOUS    glucose chewable tablet 16 g  4 Tab Oral PRN    glucagon (GLUCAGEN) injection 1 mg  1 mg IntraMUSCular PRN    dextrose 10% infusion 0-250 mL  0-250 mL IntraVENous PRN    nicotine (NICODERM CQ) 14 mg/24 hr patch 1 Patch  1 Patch TransDERmal DAILY    sodium chloride (NS) flush 5-40 mL  5-40 mL IntraVENous Q8H    sodium chloride (NS) flush 5-40 mL  5-40 mL IntraVENous PRN    acetaminophen (TYLENOL) tablet 650 mg  650 mg Oral Q4H PRN    ondansetron (ZOFRAN) injection 4 mg  4 mg IntraVENous Q4H PRN    famotidine (PF) (PEPCID) 20 mg in 0.9% sodium chloride 10 mL injection  20 mg IntraVENous Q24H    albuterol (PROVENTIL VENTOLIN) nebulizer solution 2.5 mg  2.5 mg Nebulization Q4H PRN     ______________________________________________________________________  EXPECTED LENGTH OF STAY: 4d 9h  ACTUAL LENGTH OF STAY:          MD Lucian

## 2020-04-03 NOTE — DISCHARGE SUMMARY
Discharge Summary       PATIENT ID: Coty Pittman  MRN: [id number]   YOB: 1925    DATE OF ADMISSION: 3/29/2020 10:23 PM    DATE OF DISCHARGE: 4/3/2020   PRIMARY CARE PROVIDER: Eamon Sanches MD     ATTENDING PHYSICIAN: Dr Leno Reynoso  DISCHARGING PROVIDER: Leno Reynoso MD    To contact this individual call [phone] and ask the  to page. If unavailable ask to be transferred the Adult Hospitalist Department. CONSULTATIONS: IP CONSULT TO NEUROLOGY  IP CONSULT TO PALLIATIVE CARE - PROVIDER  IP CONSULT TO NEUROSURGERY    PROCEDURES/SURGERIES: * No surgery found *    ADMITTING DIAGNOSES & HOSPITAL COURSE:   Right temporal ICH  -No surgical intervention  -Neurology, NS consulted appreciate input  -PT/OT/speech rehab vs home health  -I feel that in the current scenario the patient would be better off at home rather than at rehab/SNF. Spoke to the son, agrees that he would also like her to come home with home health.     Cerebral edema  -due to above  -steroids not indicated  -plans as above per Neuro and NS     Dementia  -Unclear baseline     Dysphagia  -now passed swallow eval  -On Pureed diet    Hypokalemia  Replace and retreat     HTN Emergency  -off Cardene  -Goal SBP<140  -Pt unable to take PO; continue labetolol PRN  -Started Clonidine patch, will add norvasc     COPD: Chronic bronchitis  -Stable  -Afeb, no leukocytosis  -No oxygen requirements  -Continue PRN nebs  -Nicotine patch (smoker)     Suspected UTI  -ruled out; UA negative    Moderate protein calorie malnutrition  -nutrition consult    PT/OT rehab/SNF vs home health        DISCHARGE DIAGNOSES / PLAN:      1. Right temporal ICH     ADDITIONAL CARE RECOMMENDATIONS:   Follow up with PMD  Follow up with Neurology  BP checks twice daily.  If BP>160/100 please call your PMD  Aspiration precautions    PENDING TEST RESULTS:   At the time of discharge the following test results are still pending: none    FOLLOW UP APPOINTMENTS: Follow-up Information     Follow up With Specialties Details Why Contact Info    PCP  In 1 week      Braeden Doty MD Neurology In 2 weeks  [address] [facility]  [phone]               DIET: Pureed Diet    ACTIVITY: Activity as tolerated      DISCHARGE MEDICATIONS:  Current Discharge Medication List      START taking these medications    Details   amLODIPine (NORVASC) 5 mg tablet Take 1 Tab by mouth daily. Qty: 30 Tab, Refills: 1      cloNIDine (CATAPRES) 0.1 mg/24 hr ptwk 1 Patch by TransDERmal route every seven (7) days. Qty: 4 Patch, Refills: 1      nicotine (NICODERM CQ) 14 mg/24 hr patch 1 Patch by TransDERmal route daily for 30 days. Qty: 30 Patch, Refills: 0         CONTINUE these medications which have NOT CHANGED    Details   albuterol (PROVENTIL HFA, VENTOLIN HFA, PROAIR HFA) 90 mcg/actuation inhaler Take 2 Puffs by inhalation every four (4) hours as needed for Wheezing. Qty: 1 Inhaler, Refills: 0      traMADol (ULTRAM) 50 mg tablet Take 50 mg by mouth as needed. STOP taking these medications       methylPREDNISolone (MEDROL, ELIEL,) 4 mg tablet Comments:   Reason for Stopping:         PSEUDOEPHEDRINE-guaiFENesin (MUCINEX D)  mg per tablet Comments:   Reason for Stopping:         HYDROcodone-acetaminophen (NORCO) 5-325 mg per tablet Comments:   Reason for Stopping:         predniSONE (DELTASONE) 20 mg tablet Comments:   Reason for Stopping:         aspirin 81 mg tablet Comments:   Reason for Stopping:                 NOTIFY YOUR PHYSICIAN FOR ANY OF THE FOLLOWING:   Fever over 101 degrees for 24 hours. Chest pain, shortness of breath, fever, chills, nausea, vomiting, diarrhea, change in mentation, falling, weakness, bleeding. Severe pain or pain not relieved by medications. Or, any other signs or symptoms that you may have questions about.     DISPOSITION:  x  Home With:   OT  PT  HH  RN       Long term SNF/Inpatient Rehab    Independent/assisted living    Hospice    Other:       PATIENT CONDITION AT DISCHARGE:     Functional status    Poor    x Deconditioned     Independent      Cognition     Lucid     Forgetful    x Dementia      Catheters/lines (plus indication)    [place]     PICC     PEG    x None      Code status   x  Full code     DNR      PHYSICAL EXAMINATION AT DISCHARGE:  Please see progress note      CHRONIC MEDICAL DIAGNOSES:  Problem List as of 4/3/2020 Date Reviewed: 12/29/2014          Codes Class Noted - Resolved    COPD (chronic obstructive pulmonary disease) ([facility] ICD-10-CM: J44.9  ICD-9-CM: 394  3/31/2020 - Present        Dementia ([facility] ICD-10-CM: F03.90  ICD-9-CM: 294.20  Unknown - Present        * (Principal) ICH (intracerebral hemorrhage) ([facility] ICD-10-CM: I61.9  ICD-9-CM: 955  3/29/2020 - Present        Acute respiratory failure ([facility] ICD-10-CM: J96.00  ICD-9-CM: 518.81  12/27/2014 - Present        Acute bronchitis ICD-10-CM: J20.9  ICD-9-CM: 466.0  12/27/2014 - Present        Elevated troponin ICD-10-CM: R79.89  ICD-9-CM: 790.6  12/27/2014 - Present        HTN (hypertension) ICD-10-CM: I10  ICD-9-CM: 401.9  12/27/2014 - Present        UTI (urinary tract infection) ICD-10-CM: N39.0  ICD-9-CM: 599.0  12/27/2014 - Present        Chest pain, unspecified ICD-10-CM: R07.9  ICD-9-CM: 786.50  4/18/2013 - Present              Greater than 37 minutes were spent with the patient on counseling and coordination of care    Signed:   Deepa Gaona MD  4/3/2020  11:45 AM

## 2020-04-03 NOTE — PROGRESS NOTES
Problem: Falls - Risk of  Goal: *Absence of Falls  Description: Document Maria Fernanda Whitehead Fall Risk and appropriate interventions in the flowsheet. Outcome: Progressing Towards Goal  Note: Fall Risk Interventions:  Mobility Interventions: Communicate number of staff needed for ambulation/transfer, Bed/chair exit alarm, Patient to call before getting OOB    Mentation Interventions: Bed/chair exit alarm, Evaluate medications/consider consulting pharmacy, More frequent rounding    Medication Interventions: Bed/chair exit alarm, Evaluate medications/consider consulting pharmacy, Patient to call before getting OOB    Elimination Interventions: Stay With Me (per policy), Patient to call for help with toileting needs, Toileting schedule/hourly rounds    History of Falls Interventions: Bed/chair exit alarm, Evaluate medications/consider consulting pharmacy         Problem: Pressure Injury - Risk of  Goal: *Prevention of pressure injury  Description: Document Isidro Scale and appropriate interventions in the flowsheet. Outcome: Progressing Towards Goal  Note: Pressure Injury Interventions:  Sensory Interventions: Assess changes in LOC, Minimize linen layers, Maintain/enhance activity level, Keep linens dry and wrinkle-free, Monitor skin under medical devices, Turn and reposition approx. every two hours (pillows and wedges if needed)    Moisture Interventions: Check for incontinence Q2 hours and as needed, Apply protective barrier, creams and emollients, Absorbent underpads, Maintain skin hydration (lotion/cream), Minimize layers    Activity Interventions: Increase time out of bed, Pressure redistribution bed/mattress(bed type), PT/OT evaluation    Mobility Interventions: PT/OT evaluation, Pressure redistribution bed/mattress (bed type), Turn and reposition approx.  every two hours(pillow and wedges)    Nutrition Interventions: Document food/fluid/supplement intake    Friction and Shear Interventions: Minimize layers Problem: Patient Education: Go to Patient Education Activity  Goal: Patient/Family Education  Outcome: Progressing Towards Goal     Problem: Hemorrhagic Stroke: Day 4  Goal: Diagnostic Test/Procedures  Outcome: Progressing Towards Goal  Goal: Nutrition/Diet  Outcome: Progressing Towards Goal  Goal: Medications  Outcome: Progressing Towards Goal  Goal: Treatments/Interventions/Procedures  Outcome: Progressing Towards Goal

## 2020-04-03 NOTE — PROGRESS NOTES
Stroke Education documented in Patient Education:   Core Measures Documented in [facility]:  Risk Factors: YES  Warning signs of stroke: YES  When to Activate 911: YES  Medication Education for Risk Factors: YES  Smoking cessation if applicable: YES  Written Education Given:  YES    Discharge NIH Completed: YES  Score: 4    BRAINS: YES    Follow Up Appointment Made: YES  Date/Time if applicable: April 9    Bedside RN performed patient education and medication education. Discharge concerns initiated and discussed with patient, including clarification on \"who\" assists the patient at their home and instructions for when the home going patient should call their provider after discharge. Opportunity for questions and clarification was provided. Patient receptive to education: NO  Patient stated: \"Okay\"  Barriers to Education: AMS  Diagnosis Education given:  YES    Length of stay: 5  Expected Day of Discharge: 4/3/2020  Ask if they have \"Help at Home\" & add to white board? YES    Education Day #: 5    Medication Education Given:  YES  M in the box Medication name: Labetolol    Pt aware of HCAHPS survey: YES    I have reviewed discharge instructions with the patient. Patient unable to verbalize understanding due to AMS. Patient discharged to home with family.

## 2020-04-03 NOTE — PROGRESS NOTES
Problem: Mobility Impaired (Adult and Pediatric)  Goal: *Acute Goals and Plan of Care (Insert Text)  Description: FUNCTIONAL STATUS PRIOR TO ADMISSION: Patient is a poor historian but states that she was ambulatory with a cane at home. HOME SUPPORT PRIOR TO ADMISSION: Patient states that she was living with family. Physical Therapy Goals  Initiated 4/1/2020  1. Patient will move from supine to sit and sit to supine  in bed with supervision/set-up within 7 day(s). 2.  Patient will transfer from bed to chair and chair to bed with supervision/set-up using the least restrictive device within 7 day(s). 3.  Patient will perform sit to stand with contact guard assist within 7 day(s). 4.  Patient will ambulate with minimal assistance/contact guard assist for 25 feet with the least restrictive device within 7 day(s). Outcome: Progressing Towards Goal   PHYSICAL THERAPY TREATMENT  Patient: Dannie Thornton (87 y.o. female)  Date: 4/3/2020  Diagnosis: ICH (intracerebral hemorrhage) ([facility]) [I61.9]   ICH (intracerebral hemorrhage) ([facility]       Precautions: Fall, Bed Alarm(SBP<140)  Chart, physical therapy assessment, plan of care and goals were reviewed. ASSESSMENT  Patient continues with skilled PT services and is progressing towards goals. Patient continues to need frequent redirection to stay on task and is limited by confusion, gait instability and fair balance. Currently needing Clara to amb approx 50 feet with RW. Assist is mainly for RW management and for cues to stay on task. Continues to be a high fall risk and if she is to DC home with need 24/7 supervision/assist to maintain safety. If family unable to provide this she may do better in SNF rehab setting.      Current Level of Function Impacting Discharge (mobility/balance): Clara for amb with RW    Other factors to consider for discharge: high fall risk, patient is confused, impaired balance         PLAN :  Patient continues to benefit from skilled intervention to address the above impairments. Continue treatment per established plan of care. to address goals. Recommendation for discharge: (in order for the patient to meet his/her long term goals)  Therapy up to 5 days/week in SNF setting vs  PT with 24/7 from family        IF patient discharges home will need the following DME: rolling walker if she doesn't already own       SUBJECTIVE:   Patient stated Ross Yepez is your sister Shayan Garcia doing?     OBJECTIVE DATA SUMMARY:   Critical Behavior:  Neurologic State: Alert  Orientation Level: Oriented to person, Disoriented to place, Disoriented to situation, Disoriented to time  Cognition: Decreased attention/concentration, Decreased command following, Poor safety awareness  Safety/Judgement: Decreased awareness of environment, Decreased awareness of need for assistance, Decreased awareness of need for safety  Functional Mobility Training:  Bed Mobility:     Supine to Sit: Supervision     Scooting: Stand-by assistance        Transfers:  Sit to Stand: Minimum assistance;Assist x1  Stand to Sit: Minimum assistance;Assist x1                             Balance:  Sitting: Intact  Standing: Impaired  Standing - Static: Constant support; Fair  Standing - Dynamic : Constant support; Fair  Ambulation/Gait Training:  Distance (ft): 50 Feet (ft)  Assistive Device: Gait belt;Walker, rolling  Ambulation - Level of Assistance: Minimal assistance;Assist x1(mainly for RW management)        Gait Abnormalities: Shuffling gait        Base of Support: Center of gravity altered;Narrowed     Speed/Jayshree: Shuffled; Slow  Step Length: Left shortened;Right shortened       Pain Rating:  No c/o pain    Activity Tolerance:   Fair and requires rest breaks  Please refer to the flowsheet for vital signs taken during this treatment.     After treatment patient left in no apparent distress:   Sitting in chair, Call bell within reach, and Bed / chair alarm activated    COMMUNICATION/COLLABORATION: The patients plan of care was discussed with: Physical therapist, Occupational therapist, and Registered nurse.      Catherine Cruz PT, DPT   Time Calculation: 26 mins

## 2020-04-03 NOTE — PROGRESS NOTES
[address] [facility] Adult  Hospitalist Group                                                                                          Hospitalist Progress Note  Sailaja Parry MD  Answering service: [phone] from in house phone        Date of Service:  2020  NAME:  Oma Ruggiero  :  1925  MRN:  [id number]      Admission Summary:    44-year-old female seen and examined today by critical care services due to initial complaints of altered mental status. Patient has past medical history for bronchitis, hypertension only. According to emergency room physician patient had initial complaints of fatigue and her son called EMS due to patient's lack of energy. Patient was altered and did not know where she was at. Patient stated she did have a cough with abdominal pain and weakness associated. Patient initial temperature 100 °F.  No productive cough. Neurosurgery has been consulted for her acute intracranial hemorrhage with subdural hematoma extension. Plan is to continue neurochecks. Obtain respiratory viral panel to rule out any influenza. We will continue patient on Cardene drip for her life-threatening hypertensive emergency. Plan is to follow neurosurgery and neurology recommendations for her life-threatening intercerebral hemorrhage. Continue neurochecks. Transferred out of ICU on 3/31    Interval history / Subjective:      Follow up  [address]  Cleared by SLP to have pureed diet  Still confused     Assessment & Plan:     Right temporal ICH  -No surgical intervention  -Neurology, NS consulted appreciate input  -PT/OT/speech rehab vs home health  -I feel that in the current scenario the patient would be better off at home rather than at rehab/SNF     Cerebral edema  -due to above  -steroids not indicated  -plans as above per Neuro and NS     Dementia  -Unclear baseline     Dysphagia  -failed swallow  -MBS  when more awake  -D5 1/2 NS @ 50mL/hr while NPO    Hypokalemia  Replace and retreat     HTN Emergency  -off Cardene  -Goal SBP<140  -Pt unable to take PO; continue labetolol PRN  -Start Clonidine patch     COPD: Chronic bronchitis  -Stable  -Afeb, no leukocytosis  -No oxygen requirements  -Continue PRN nebs  -Nicotine patch (smoker)     Suspected UTI  -ruled out; UA negative    Moderate protein calorie malnutrition  -nutrition consult    PT/OT rehab/SNF vs home health        DVTppx: SCDs  Gippx: Famotidine  Diet: Pureed diet  Code Status: Full, appreciate palliative care    Plan: Discharge planning    Activity: w/ assist; fall risk  Discharge: Pending       Hospital Problems  Date Reviewed: 12/29/2014          Codes Class Noted POA    COPD (chronic obstructive pulmonary disease) ([facility] ICD-10-CM: J44.9  ICD-9-CM: 555  3/31/2020 Yes        Dementia ([facility] ICD-10-CM: F03.90  ICD-9-CM: 294.20  Unknown Yes        * (Principal) ICH (intracerebral hemorrhage) ([facility] ICD-10-CM: I61.9  ICD-9-CM: 733  3/29/2020 Unknown        Acute bronchitis ICD-10-CM: J20.9  ICD-9-CM: 466.0  12/27/2014 Yes        HTN (hypertension) ICD-10-CM: I10  ICD-9-CM: 401.9  12/27/2014 Yes                Review of Systems:   Review of systems not obtained due to patient factors. Vital Signs:    Last 24hrs VS reviewed since prior progress note. Most recent are:  Visit Vitals  /67   Pulse 71   Temp 98.1 °F (36.7 °C)   Resp 22   Wt 41.6 kg (91 lb 11.4 oz)   SpO2 99%   BMI 15.26 kg/m²       No intake or output data in the 24 hours ending 04/02/20 2029     Physical Examination:             Constitutional:  No acute distress, very less reponse   ENT:  Oral mucosa moist, oropharynx benign. Resp:  CTA bilaterally. No wheezing/rhonchi/rales. No accessory muscle use   CV:  Regular rhythm, normal rate, no murmurs, gallops, rubs    GI:  Soft, non distended, non tender.  normoactive bowel sounds, no hepatosplenomegaly     Musculoskeletal:  No edema, warm, 2+ pulses throughout    Neurologic:  difficult to assess, very less response           Data Review:    Review and/or order of clinical lab test      Labs:     Recent Labs     04/02/20  0040 04/01/20  0119   WBC 5.9 5.9   HGB 11.6 11.1*   HCT 36.9 36.2    154     Recent Labs     04/02/20  0040 04/01/20  0119 03/31/20  0546   * 134* 137   K 3.8 3.2* 3.8    102 101   CO2 23 23 28   BUN 16 10 10   CREA 1.31* 1.01 0.99   * 95 75   CA 8.8 8.8 9.1   MG 1.5* 1.4* 1.5*   PHOS 2.5* 2.4* 2.4*     No results for input(s): SGOT, GPT, ALT, AP, TBIL, TBILI, TP, ALB, GLOB, GGT, AML, LPSE in the last 72 hours. No lab exists for component: AMYP, HLPSE  Recent Labs     04/01/20 0119 03/31/20  0546   INR 1.0 1.0   PTP 10.5 10.5      No results for input(s): FE, TIBC, PSAT, FERR in the last 72 hours. No results found for: FOL, RBCF   No results for input(s): PH, PCO2, PO2 in the last 72 hours. No results for input(s): CPK, CKNDX, TROIQ in the last 72 hours.     No lab exists for component: CPKMB  Lab Results   Component Value Date/Time    Cholesterol, total 158 03/30/2020 02:49 AM    HDL Cholesterol 54 03/30/2020 02:49 AM    LDL, calculated 87.2 03/30/2020 02:49 AM    Triglyceride 84 03/30/2020 02:49 AM    CHOL/HDL Ratio 2.9 03/30/2020 02:49 AM     Lab Results   Component Value Date/Time    Glucose (POC) 128 (H) 04/02/2020 04:59 PM    Glucose (POC) 93 04/02/2020 11:19 AM    Glucose (POC) 74 04/02/2020 06:18 AM    Glucose (POC) 111 (H) 04/01/2020 11:57 PM    Glucose (POC) 86 04/01/2020 05:05 PM     Lab Results   Component Value Date/Time    Color YELLOW/STRAW 03/29/2020 07:54 PM    Appearance CLEAR 03/29/2020 07:54 PM    Specific gravity 1.015 03/29/2020 07:54 PM    Specific gravity 1.025 08/26/2018 04:38 PM    pH (UA) 7.0 03/29/2020 07:54 PM    Protein NEGATIVE  03/29/2020 07:54 PM    Glucose NEGATIVE  03/29/2020 07:54 PM    Ketone NEGATIVE  03/29/2020 07:54 PM    Bilirubin NEGATIVE  03/29/2020 07:54 PM    Urobilinogen 1.0 03/29/2020 07:54 PM    Nitrites NEGATIVE 03/29/2020 07:54 PM    Leukocyte Esterase NEGATIVE  03/29/2020 07:54 PM    Epithelial cells FEW 03/29/2020 07:54 PM    Bacteria NEGATIVE  03/29/2020 07:54 PM    WBC 0-4 03/29/2020 07:54 PM    RBC 0-5 03/29/2020 07:54 PM         Medications Reviewed:     Current Facility-Administered Medications   Medication Dose Route Frequency    polyethylene glycol (MIRALAX) packet 17 g  17 g Oral BID    cloNIDine (CATAPRES) 0.1 mg/24 hr patch 1 Patch  1 Patch TransDERmal Q7D    labetaloL (NORMODYNE;TRANDATE) injection 20 mg  20 mg IntraVENous Q2H PRN    haloperidol lactate (HALDOL) injection 2 mg  2 mg IntraVENous Q4H PRN    dextrose 5 % - 0.45% NaCl infusion  50 mL/hr IntraVENous CONTINUOUS    glucose chewable tablet 16 g  4 Tab Oral PRN    glucagon (GLUCAGEN) injection 1 mg  1 mg IntraMUSCular PRN    dextrose 10% infusion 0-250 mL  0-250 mL IntraVENous PRN    nicotine (NICODERM CQ) 14 mg/24 hr patch 1 Patch  1 Patch TransDERmal DAILY    sodium chloride (NS) flush 5-40 mL  5-40 mL IntraVENous Q8H    sodium chloride (NS) flush 5-40 mL  5-40 mL IntraVENous PRN    acetaminophen (TYLENOL) tablet 650 mg  650 mg Oral Q4H PRN    ondansetron (ZOFRAN) injection 4 mg  4 mg IntraVENous Q4H PRN    famotidine (PF) (PEPCID) 20 mg in 0.9% sodium chloride 10 mL injection  20 mg IntraVENous Q24H    albuterol (PROVENTIL VENTOLIN) nebulizer solution 2.5 mg  2.5 mg Nebulization Q4H PRN     ______________________________________________________________________  EXPECTED LENGTH OF STAY: 4d 9h  ACTUAL LENGTH OF STAY:          MD Lucian

## 2020-04-04 ENCOUNTER — PATIENT OUTREACH (OUTPATIENT)
Dept: CASE MANAGEMENT | Age: 85
End: 2020-04-04

## 2020-04-04 NOTE — PROGRESS NOTES
Care transitions nurse -LM -for son/ request call - called Lyndsey - re: care needs and home care evaluation -  
 
Ms. Young Abilio presented to [address] [facility] ER for change in sensorium, confusion , lethargy - and was diagnosed with right temporal hemorrhage/ subdural hematoma - History of dementia - unsure of recent fall. 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 Attempt to reach son - no answer - called alternate contact - reached sister who is estranged from brother - and he does not communicate with her - she has concerns about her brother and her mom's care needs. Call to [facility] - and they will try to reach son - and set up initial visit - no respiratory or COVID s/s demonstrated - CXR -  Was clear. Request [facility] evaluate home situation and care -  
[facility] can transition to Hospice - if pt/ family request. 
 
4/8/2020 - Pt returned to ER on 4/7 for somnolence, poor responsiveness - Pt was evaluated and symptoms abated -  
CTN episode closed -new episode with Harwood Prader, RN - Rolanda Lawton RN 
  
________________________________________________________________________________ Radiologic Studies -  
CT HEAD WO CONT Final Result IMPRESSION:   
   
Acute right temporal hemorrhage with associated subdural hematoma. This is most  
suggestive of angiopathy, subacute in nature with the surrounding and number. No  
significant mass effect at this time.  
   
   
XR CHEST PORT Final Result IMPRESSION: Emphysema without apparent infiltrate. Clinical Impression:  
1. Intracranial hemorrhage ([address] 2. Acute encephalopathy 3. Hypertensive emergency 4. Dementia without behavioral disturbance, unspecified dementia type ([address] 5. Subdural hematoma ([address] 6. Cerebral edema (HCC) COMPARISON: 3/29/2020 
  
 FINDINGS: A portable AP radiograph of the chest was obtained at 2309 hours. The 
patient is on a cardiac monitor. The lungs are hyperinflated but clear. The 
cardiac and mediastinal contours and pulmonary vascularity are normal.  The 
bones and soft tissues are grossly within normal limits.  
  
IMPRESSION: No acute cardiopulmonary process seen Odette Reyna RN, CHFN, [facility] Care transitions nurse [phone] Coordination Team

## 2020-04-07 ENCOUNTER — APPOINTMENT (OUTPATIENT)
Dept: CT IMAGING | Age: 85
End: 2020-04-07
Attending: EMERGENCY MEDICINE
Payer: MEDICARE

## 2020-04-07 ENCOUNTER — HOSPITAL ENCOUNTER (EMERGENCY)
Age: 85
Discharge: HOME | End: 2020-04-07
Attending: EMERGENCY MEDICINE | Admitting: EMERGENCY MEDICINE
Payer: MEDICARE

## 2020-04-07 ENCOUNTER — APPOINTMENT (OUTPATIENT)
Dept: GENERAL RADIOLOGY | Age: 85
End: 2020-04-07
Attending: EMERGENCY MEDICINE
Payer: MEDICARE

## 2020-04-07 VITALS
SYSTOLIC BLOOD PRESSURE: 152 MMHG | DIASTOLIC BLOOD PRESSURE: 75 MMHG | RESPIRATION RATE: 20 BRPM | TEMPERATURE: 98.7 F | HEART RATE: 75 BPM | OXYGEN SATURATION: 98 %

## 2020-04-07 DIAGNOSIS — R41.89 UNRESPONSIVE EPISODE: Primary | ICD-10-CM

## 2020-04-07 LAB
ALBUMIN SERPL-MCNC: 3.1 G/DL (ref 3.5–5)
ALBUMIN/GLOB SERPL: 0.7 {RATIO} (ref 1.1–2.2)
ALP SERPL-CCNC: 187 U/L (ref 45–117)
ALT SERPL-CCNC: 13 U/L (ref 12–78)
ANION GAP SERPL CALC-SCNC: 3 MMOL/L (ref 5–15)
APPEARANCE UR: CLEAR
AST SERPL-CCNC: 15 U/L (ref 15–37)
BACTERIA URNS QL MICRO: ABNORMAL /HPF
BASOPHILS # BLD: 0.1 K/UL (ref 0–0.1)
BASOPHILS NFR BLD: 1 % (ref 0–1)
BILIRUB SERPL-MCNC: 0.3 MG/DL (ref 0.2–1)
BILIRUB UR QL: NEGATIVE
BUN SERPL-MCNC: 17 MG/DL (ref 6–20)
BUN/CREAT SERPL: 16 (ref 12–20)
CALCIUM SERPL-MCNC: 9.2 MG/DL (ref 8.5–10.1)
CHLORIDE SERPL-SCNC: 101 MMOL/L (ref 97–108)
CO2 SERPL-SCNC: 29 MMOL/L (ref 21–32)
COLOR UR: ABNORMAL
COMMENT, HOLDF: NORMAL
CREAT SERPL-MCNC: 1.06 MG/DL (ref 0.55–1.02)
DIFFERENTIAL METHOD BLD: ABNORMAL
EOSINOPHIL # BLD: 1.1 K/UL (ref 0–0.4)
EOSINOPHIL NFR BLD: 13 % (ref 0–7)
EPITH CASTS URNS QL MICRO: ABNORMAL /LPF
ERYTHROCYTE [DISTWIDTH] IN BLOOD BY AUTOMATED COUNT: 15.9 % (ref 11.5–14.5)
GLOBULIN SER CALC-MCNC: 4.4 G/DL (ref 2–4)
GLUCOSE SERPL-MCNC: 88 MG/DL (ref 65–100)
GLUCOSE UR STRIP.AUTO-MCNC: NEGATIVE MG/DL
HCT VFR BLD AUTO: 39.7 % (ref 35–47)
HGB BLD-MCNC: 12.3 G/DL (ref 11.5–16)
HGB UR QL STRIP: NEGATIVE
HYALINE CASTS URNS QL MICRO: ABNORMAL /LPF (ref 0–5)
IMM GRANULOCYTES # BLD AUTO: 0 K/UL (ref 0–0.04)
IMM GRANULOCYTES NFR BLD AUTO: 0 % (ref 0–0.5)
KETONES UR QL STRIP.AUTO: NEGATIVE MG/DL
LEUKOCYTE ESTERASE UR QL STRIP.AUTO: NEGATIVE
LYMPHOCYTES # BLD: 2 K/UL (ref 0.8–3.5)
LYMPHOCYTES NFR BLD: 24 % (ref 12–49)
MCH RBC QN AUTO: 22.2 PG (ref 26–34)
MCHC RBC AUTO-ENTMCNC: 31 G/DL (ref 30–36.5)
MCV RBC AUTO: 71.8 FL (ref 80–99)
MONOCYTES # BLD: 0.7 K/UL (ref 0–1)
MONOCYTES NFR BLD: 8 % (ref 5–13)
NEUTS SEG # BLD: 4.5 K/UL (ref 1.8–8)
NEUTS SEG NFR BLD: 54 % (ref 32–75)
NITRITE UR QL STRIP.AUTO: NEGATIVE
NRBC # BLD: 0 K/UL (ref 0–0.01)
NRBC BLD-RTO: 0 PER 100 WBC
PH UR STRIP: >8.5 [PH] (ref 5–8)
PLATELET # BLD AUTO: 243 K/UL (ref 150–400)
PLATELET COMMENTS,PCOM: ABNORMAL
POTASSIUM SERPL-SCNC: 5 MMOL/L (ref 3.5–5.1)
PROT SERPL-MCNC: 7.5 G/DL (ref 6.4–8.2)
PROT UR STRIP-MCNC: NEGATIVE MG/DL
RBC # BLD AUTO: 5.53 M/UL (ref 3.8–5.2)
RBC #/AREA URNS HPF: ABNORMAL /HPF (ref 0–5)
RBC MORPH BLD: ABNORMAL
SAMPLES BEING HELD,HOLD: NORMAL
SODIUM SERPL-SCNC: 133 MMOL/L (ref 136–145)
SP GR UR REFRACTOMETRY: 1.01 (ref 1–1.03)
UR CULT HOLD, URHOLD: NORMAL
UROBILINOGEN UR QL STRIP.AUTO: 1 EU/DL (ref 0.2–1)
WBC # BLD AUTO: 8.4 K/UL (ref 3.6–11)
WBC URNS QL MICRO: ABNORMAL /HPF (ref 0–4)

## 2020-04-07 PROCEDURE — 99283 EMERGENCY DEPT VISIT LOW MDM: CPT

## 2020-04-07 PROCEDURE — 51701 INSERT BLADDER CATHETER: CPT

## 2020-04-07 PROCEDURE — 71045 X-RAY EXAM CHEST 1 VIEW: CPT

## 2020-04-07 PROCEDURE — 70450 CT HEAD/BRAIN W/O DYE: CPT

## 2020-04-07 PROCEDURE — 80053 COMPREHEN METABOLIC PANEL: CPT

## 2020-04-07 PROCEDURE — 81001 URINALYSIS AUTO W/SCOPE: CPT

## 2020-04-07 PROCEDURE — 36415 COLL VENOUS BLD VENIPUNCTURE: CPT

## 2020-04-07 PROCEDURE — 85025 COMPLETE CBC W/AUTO DIFF WBC: CPT

## 2020-04-07 NOTE — ED TRIAGE NOTES
Per son patient was seen by physical therapy today and after they left around 1330 the patient had an episode of AMS. Per son she slumped over in the chair and would not respond to him. Pt is alert now and c/o HA. Pt appears sleepy. Hx of Dementia. Recently dc'd from here for stroke.

## 2020-04-07 NOTE — ED PROVIDER NOTES
HPI .  Patient was admitted recently with right temporal intraparenchymal bleed associated with subarachnoid and subdural hematoma. She lives with her son and her son took her home 4 or 5 days ago. She was not walking or talking very much when she was discharged but as soon as she got home he says she improved markedly and has been walking with her Rollator outside the house and walking independently in the house. He says she did well with physical therapy today but complained of some mild left flank pain during the physical therapy;patient did ambulate while the therapist was at her home. After the therapist left the patient had an episode where her head was down and she was not talking much. Past Medical History:  
Diagnosis Date  COPD (chronic obstructive pulmonary disease) ([facility] 3/31/2020  Dementia ([facility]  Hypertension  Other ill-defined conditions(799.89) 2 MI's last one in 2007 History reviewed. No pertinent surgical history. History reviewed. No pertinent family history. Social History Socioeconomic History  Marital status:  Spouse name: Not on file  Number of children: Not on file  Years of education: Not on file  Highest education level: Not on file Occupational History  Not on file Social Needs  Financial resource strain: Not on file  Food insecurity Worry: Not on file Inability: Not on file  Transportation needs Medical: Not on file Non-medical: Not on file Tobacco Use  Smoking status: Current Every Day Smoker  Smokeless tobacco: Never Used Substance and Sexual Activity  Alcohol use: Yes  Drug use: Not on file  Sexual activity: Not on file Lifestyle  Physical activity Days per week: Not on file Minutes per session: Not on file  Stress: Not on file Relationships  Social connections Talks on phone: Not on file Gets together: Not on file Attends Sabianist service: Not on file Active member of club or organization: Not on file Attends meetings of clubs or organizations: Not on file Relationship status: Not on file  Intimate partner violence Fear of current or ex partner: Not on file Emotionally abused: Not on file Physically abused: Not on file Forced sexual activity: Not on file Other Topics Concern  Not on file Social History Narrative  Not on file ALLERGIES: Patient has no known allergies. Review of Systems Reason unable to perform ROS: Has a poor memory. Vitals:  
 04/07/20 1728 BP: 136/69 Pulse: 72 Resp: 18 Temp: 98.7 °F (37.1 °C) SpO2: 94% Physical Exam 
Vitals signs and nursing note reviewed. Constitutional:   
   Appearance: She is well-developed. HENT:  
   Head: Normocephalic and atraumatic. Eyes:  
   Pupils: Pupils are equal, round, and reactive to light. Neck: Musculoskeletal: Normal range of motion and neck supple. Cardiovascular:  
   Rate and Rhythm: Normal rate and regular rhythm. Heart sounds: Normal heart sounds. No murmur. No friction rub. No gallop. Pulmonary:  
   Effort: Pulmonary effort is normal. No respiratory distress. Breath sounds: No wheezing or rales. Abdominal:  
   Palpations: Abdomen is soft. Tenderness: There is no abdominal tenderness. There is no rebound. Musculoskeletal: Normal range of motion. General: No tenderness. Comments: Very thin legs Skin: 
   Findings: No erythema. Neurological:  
   Mental Status: She is alert. Cranial Nerves: No cranial nerve deficit. Comments: Motor; symmetric; she can lift both heels off the stretcher but not the rest of her legs Psychiatric:     
   Behavior: Behavior normal.  
   Comments: Oriented to name and hospital.  She does not know the president's name nor year nor month; follow simple commands; affect flat MDM 
  
 
 Procedures

## 2020-04-08 ENCOUNTER — PATIENT OUTREACH (OUTPATIENT)
Dept: CASE MANAGEMENT | Age: 85
End: 2020-04-08

## 2020-04-08 NOTE — PROGRESS NOTES
Patient contacted regarding recent discharge and COVID-19 risk Care Transition Nurse/ Ambulatory Care Manager contacted the family by telephone to perform post discharge assessment. Verified name and  with family as identifiers. Patient has following risk factors of: Recent CVA, Smoker of 80 yrs, Dementia, COPD. CTN/ACM reviewed discharge instructions, medical action plan and red flags related to discharge diagnosis. Reviewed and educated them on any new and changed medications related to discharge diagnosis. Advised obtaining a 90-day supply of all daily and as-needed medications. Education provided regarding infection prevention, and signs and symptoms of COVID-19 and when to seek medical attention with family who verbalized understanding. Discussed exposure protocols and quarantine from [address] you at higher risk for severe illness  and given an opportunity for questions and concerns. The family agrees to contact the COVID-19 hotline [phone] or PCP office for questions related to their healthcare. CTN/ACM provided contact information for future reference. From CDC: Are you at higher risk for severe illness?  Wash your hands often.  Avoid close contact (6 feet, which is about two arm lengths) with people who are sick.  Put distance between yourself and other people if COVID-19 is spreading in your community.  Clean and disinfect frequently touched surfaces.  Avoid all cruise travel and non-essential air travel.  Call your healthcare professional if you have concerns about COVID-19 and your underlying condition or if you are sick. For more information on steps you can take to protect yourself, see CDC's How to Protect Yourself Patient/family/caregiver given information for [employer] and agrees to enroll yes Patient's preferred e-mail:  [email or web address] Patient's preferred phone number: [phone] Based on Loop alert triggers, patient will be contacted by nurse care manager for worsening symptoms. Plan for follow-up call in 14 days based on severity of symptoms and risk factors

## 2020-04-09 ENCOUNTER — VIRTUAL VISIT (OUTPATIENT)
Dept: INTERNAL MEDICINE CLINIC | Age: 85
End: 2020-04-09

## 2020-04-09 DIAGNOSIS — Z72.0 TOBACCO ABUSE: ICD-10-CM

## 2020-04-09 DIAGNOSIS — I10 HYPERTENSION, UNSPECIFIED TYPE: ICD-10-CM

## 2020-04-09 DIAGNOSIS — K59.00 CONSTIPATION, UNSPECIFIED CONSTIPATION TYPE: ICD-10-CM

## 2020-04-09 DIAGNOSIS — F03.90 DEMENTIA WITHOUT BEHAVIORAL DISTURBANCE, UNSPECIFIED DEMENTIA TYPE: ICD-10-CM

## 2020-04-09 DIAGNOSIS — I61.9 RIGHT-SIDED NONTRAUMATIC INTRACEREBRAL HEMORRHAGE, UNSPECIFIED CEREBRAL LOCATION (HCC): Primary | ICD-10-CM

## 2020-04-09 DIAGNOSIS — J44.9 CHRONIC OBSTRUCTIVE PULMONARY DISEASE, UNSPECIFIED COPD TYPE (HCC): ICD-10-CM

## 2020-04-09 RX ORDER — ALBUTEROL SULFATE 90 UG/1
2 AEROSOL, METERED RESPIRATORY (INHALATION)
Qty: 1 INHALER | Refills: 1 | Status: SHIPPED | OUTPATIENT
Start: 2020-04-09

## 2020-04-09 NOTE — PROGRESS NOTES
Chief Complaint Patient presents with  
[facility] Follow Up 1. Have you been to the ER, urgent care clinic since your last visit? Hospitalized since your last visit? Yes When: 4/7/2020 Where: [facility] Reason for visit: Altered Mental Status 2. Have you seen or consulted any other health care providers outside of the [address] [place] [address] since your last visit? Include any pap smears or colon screening.  No

## 2020-04-10 RX ORDER — NICOTINE 7MG/24HR
1 PATCH, TRANSDERMAL 24 HOURS TRANSDERMAL EVERY 24 HOURS
Qty: 30 PATCH | Refills: 0 | Status: SHIPPED | OUTPATIENT
Start: 2020-04-10 | End: 2020-05-10

## 2020-04-10 NOTE — PROGRESS NOTES
Consent: Valdemar Finn, who was seen by synchronous (real-time) audio-video technology, and/or her healthcare decision maker, is aware that this patient-initiated, Telehealth encounter on 4/9/2020 is a billable service, with coverage as determined by her insurance carrier. She is aware that she may receive a bill and has provided verbal consent to proceed: Yes. Assessment & Plan:  
Diagnoses and all orders for this visit: 1. Right-sided nontraumatic intracerebral hemorrhage, SAH, subdral hemorrhage in rt temporal lobe(HCC)- stable. Continue OT/PT, bp /lipid control 1a. Rt cerebellar infarct, acute vs subacute 2. Hypertension, unspecified type- controlled on current medication regimen. Continue cardiac diet, exrcise as tolerated 3. Chronic obstructive pulmonary disease, emphysema change on cxr [facility])- continue albuterol; tobacco cessation-continue nicotine patch 14mg/24hr; mail 7 mg patch 4. Dementia without behavioral disturbance, unspecified dementia type [facility])- ensure safety measures in home 5. Tobacco abuse-continue nicotine patch 14mg/24hr; mail 7 mg patch 6. Constipation, unspecified constipation type- resolved-stop miralax Other orders 
-     albuterol (PROVENTIL HFA, VENTOLIN HFA, PROAIR HFA) 90 mcg/actuation inhaler; Take 2 Puffs by inhalation every four (4) hours as needed for Wheezing. I spent at least 30 minutes with this new patient, and >50% of the time was spent counseling and/or coordinating care regarding stroke, hypertension, dementia, tobacco dependency, wheezing and cosntipation [address] Subjective:  
Valdemar Finn is a 80 y.o. female who was seen for Hospital Follow Up Pt is seen for Transition of Care visit who provides the history. Her son provides most of the history. Hospital admission was for intracerebral hemorrhage. Home bp is 130/80. Pt's son states that his mother is better following the stroke. Pre-admission symptoms of leg pain have resolved. OT/PT involved in home. Worsening incontinence and mentation, \"talkng out of her head\" but there are no behavorial problems. Appetite is good. Pt denies headache chest pain, abdominal pain or back pain. Pt is using nicotine patch 14mg/24hr. Constipation is resolved. Pts son agrees to stop Miralax. He is the primary caregiver. Prior to Admission medications Medication Sig Start Date End Date Taking? Authorizing Provider  
albuterol (PROVENTIL HFA, VENTOLIN HFA, PROAIR HFA) 90 mcg/actuation inhaler Take 2 Puffs by inhalation every four (4) hours as needed for Wheezing. 4/9/20  Yes Katharine Hernandez MD  
cloNIDine (CATAPRES) 0.1 mg/24 hr ptwk 1 Patch by TransDERmal route every seven (7) days. 4/9/20  Yes Marilyn High MD  
nicotine (NICODERM CQ) 14 mg/24 hr patch 1 Patch by TransDERmal route daily for 30 days. 4/4/20 5/4/20 Yes Marilyn High MD  
amLODIPine (NORVASC) 5 mg tablet Take 2 Tabs by mouth daily. 4/4/20  Yes Marilyn High MD  
polyethylene glycol (MIRALAX) 17 gram packet Take 1 Packet by mouth two (2) times daily as needed for Constipation. 4/3/20   Marilyn High MD  
traMADol Rexine Barrs) 50 mg tablet Take 50 mg by mouth as needed. Provider, Historical  
 
No Known Allergies Patient Active Problem List  
Diagnosis Code  Chest pain, unspecified R07.9  Acute respiratory failure ([facility]) J96.00  
 Acute bronchitis J20.9  Elevated troponin R79.89  
 HTN (hypertension) I10  
 UTI (urinary tract infection) N39.0  
 ICH (intracerebral hemorrhage) ([facility]) I61.9  
 COPD (chronic obstructive pulmonary disease) ([facility]) J44.9  Dementia ([facility] [address] F03.90 Current Outpatient Medications Medication Sig Dispense Refill  albuterol (PROVENTIL HFA, VENTOLIN HFA, PROAIR HFA) 90 mcg/actuation inhaler Take 2 Puffs by inhalation every four (4) hours as needed for Wheezing.  1 Inhaler 1  
 cloNIDine (CATAPRES) 0.1 mg/24 hr ptwk 1 Patch by TransDERmal route every seven (7) days. 4 Patch 1  
 nicotine (NICODERM CQ) 14 mg/24 hr patch 1 Patch by TransDERmal route daily for 30 days. 30 Patch 0  
 amLODIPine (NORVASC) 5 mg tablet Take 2 Tabs by mouth daily. 60 Tab 1  polyethylene glycol (MIRALAX) 17 gram packet Take 1 Packet by mouth two (2) times daily as needed for Constipation. 30 Packet 0  
 traMADol (ULTRAM) 50 mg tablet Take 50 mg by mouth as needed. No Known Allergies Past Medical History:  
Diagnosis Date  COPD (chronic obstructive pulmonary disease) ([facility] [address] 3/31/2020  Dementia ([facility] [address]  Hypertension  Other ill-defined conditions(799.89) 2 MI's last one in 2007 History reviewed. No pertinent surgical history. History reviewed. No pertinent family history. Social History Tobacco Use  Smoking status: Current Every Day Smoker  Smokeless tobacco: Never Used Substance Use Topics  Alcohol use: Yes ROS Per hpi 
 
 
Objective:  
Vital Signs: (As obtained by patient/caregiver at home) There were no vitals taken for this visit. [INSTRUCTIONS:  \"[x]\" Indicates a positive item  \"[]\" Indicates a negative item  -- DELETE ALL ITEMS NOT EXAMINED] Constitutional: [x] Appears well-developed and well-nourished [x] No apparent distress   
  [] Abnormal - Mental status: [x] Alert and awake  [x] Oriented to person/place/time [x] Able to follow commands   
[] Abnormal - Eyes:   EOM    [x]  Normal    [] Abnormal -  
Sclera  [x]  Normal    [] Abnormal - 
        Discharge [x]  None visible   [] Abnormal - HENT: [x] Normocephalic, atraumatic  [] Abnormal -  
[x] Mouth/Throat: Mucous membranes are moist 
 
External Ears [x] Normal  [] Abnormal - Neck: [x] No visualized mass [] Abnormal - Pulmonary/Chest: [x] Respiratory effort normal   [x] No visualized signs of difficulty breathing or respiratory distress 
      [] Abnormal - Musculoskeletal:   [] Normal gait with no signs of ataxia [x] Normal range of motion of neck [] Abnormal -  
 
Neurological:        [x] No Facial Asymmetry (Cranial nerve 7 motor function) (limited exam due to video visit) [x] No gaze palsy  
     [] Abnormal -   
     
Skin:        [x] No significant exanthematous lesions or discoloration noted on facial skin   
     [] Abnormal - Psychiatric:       [x] Normal Affect [] Abnormal -  
     [x] No Hallucinations Other pertinent observable physical exam findings:- 
 
 
 
We discussed the expected course, resolution and complications of the diagnosis(es) in detail. Medication risks, benefits, costs, interactions, and alternatives were discussed as indicated. I advised her to contact the office if her condition worsens, changes or fails to improve as anticipated. She expressed understanding with the diagnosis(es) and plan. Giovanni Jones is a 80 y.o. female being evaluated by a video visit encounter for concerns as above. A caregiver was present when appropriate. Due to this being a TeleHealth encounter (During KIYXE-39 public health emergency), evaluation of the following organ systems was limited: Vitals/Constitutional/EENT/Resp/CV/GI//MS/Neuro/Skin/Heme-Lymph-Imm. Pursuant to the emergency declaration under the [facility], [facility]5 waiver authority and the [employer] and [employer] Act, this Virtual  Visit was conducted, with patient's (and/or legal guardian's) consent, to reduce the patient's risk of exposure to COVID-19 and provide necessary medical care. Services were provided through a video synchronous discussion virtually to substitute for in-person clinic visit. Patient and provider were located at their individual homes.  
 
 
 
Brendan Finn MD

## 2020-04-23 ENCOUNTER — PATIENT OUTREACH (OUTPATIENT)
Dept: FAMILY MEDICINE CLINIC | Age: 85
End: 2020-04-23

## 2020-04-23 NOTE — PROGRESS NOTES
Patient resolved from Transition of Care episode on 4/23/2020  Patient/family has been provided the following resources and education related to COVID-19:                         Signs, symptoms and red flags related to COVID-19            CDC exposure and quarantine guidelines            Conduit exposure contact - [phone]            Contact for their local Department of Health                 Patient's son currently reports that the following symptoms have improved:  no new symptoms and no worsening symptoms. No further outreach scheduled with this CTN/ACM. Episode of Care resolved. Patient's son has this CTN/ACM contact information if future needs arise.

## 2021-01-01 ENCOUNTER — HOSPITAL ENCOUNTER (INPATIENT)
Age: 86
LOS: 1 days | DRG: 871 | End: 2021-10-06
Attending: EMERGENCY MEDICINE | Admitting: FAMILY MEDICINE
Payer: MEDICARE

## 2021-01-01 ENCOUNTER — APPOINTMENT (OUTPATIENT)
Dept: GENERAL RADIOLOGY | Age: 86
DRG: 871 | End: 2021-01-01
Attending: HOSPITALIST
Payer: MEDICARE

## 2021-01-01 ENCOUNTER — APPOINTMENT (OUTPATIENT)
Dept: NON INVASIVE DIAGNOSTICS | Age: 86
DRG: 871 | End: 2021-01-01
Attending: EMERGENCY MEDICINE
Payer: MEDICARE

## 2021-01-01 ENCOUNTER — APPOINTMENT (OUTPATIENT)
Dept: CT IMAGING | Age: 86
DRG: 871 | End: 2021-01-01
Attending: EMERGENCY MEDICINE
Payer: MEDICARE

## 2021-01-01 ENCOUNTER — APPOINTMENT (OUTPATIENT)
Dept: GENERAL RADIOLOGY | Age: 86
DRG: 871 | End: 2021-01-01
Attending: EMERGENCY MEDICINE
Payer: MEDICARE

## 2021-01-01 DIAGNOSIS — R10.13 ABDOMINAL PAIN, EPIGASTRIC: ICD-10-CM

## 2021-01-01 DIAGNOSIS — E87.0 HYPERNATREMIA: ICD-10-CM

## 2021-01-01 DIAGNOSIS — N17.9 AKI (ACUTE KIDNEY INJURY) (HCC): ICD-10-CM

## 2021-01-01 DIAGNOSIS — G93.41 METABOLIC ENCEPHALOPATHY: ICD-10-CM

## 2021-01-01 DIAGNOSIS — E87.20 LACTIC ACIDOSIS: ICD-10-CM

## 2021-01-01 DIAGNOSIS — E86.0 DEHYDRATION: ICD-10-CM

## 2021-01-01 DIAGNOSIS — R11.2 NAUSEA AND VOMITING IN ADULT: ICD-10-CM

## 2021-01-01 DIAGNOSIS — E87.20 METABOLIC ACIDOSIS: ICD-10-CM

## 2021-01-01 DIAGNOSIS — K85.90 ACUTE PANCREATITIS, UNSPECIFIED COMPLICATION STATUS, UNSPECIFIED PANCREATITIS TYPE: Primary | ICD-10-CM

## 2021-01-01 LAB
25(OH)D3 SERPL-MCNC: 20.4 NG/ML (ref 30–100)
ALBUMIN SERPL-MCNC: 1.5 G/DL (ref 3.5–5)
ALBUMIN SERPL-MCNC: 2.2 G/DL (ref 3.5–5)
ALBUMIN SERPL-MCNC: 2.8 G/DL (ref 3.5–5)
ALBUMIN/GLOB SERPL: 0.6 {RATIO} (ref 1.1–2.2)
ALBUMIN/GLOB SERPL: 0.6 {RATIO} (ref 1.1–2.2)
ALBUMIN/GLOB SERPL: 0.9 {RATIO} (ref 1.1–2.2)
ALP SERPL-CCNC: 143 U/L (ref 45–117)
ALP SERPL-CCNC: 146 U/L (ref 45–117)
ALP SERPL-CCNC: 80 U/L (ref 45–117)
ALT SERPL-CCNC: 2683 U/L (ref 12–78)
ALT SERPL-CCNC: 39 U/L (ref 12–78)
ALT SERPL-CCNC: 45 U/L (ref 12–78)
AMMONIA PLAS-SCNC: 33 UMOL/L
ANION GAP SERPL CALC-SCNC: 10 MMOL/L (ref 5–15)
ANION GAP SERPL CALC-SCNC: 13 MMOL/L (ref 5–15)
ANION GAP SERPL CALC-SCNC: 21 MMOL/L (ref 5–15)
APPEARANCE UR: ABNORMAL
ARTERIAL PATENCY WRIST A: ABNORMAL
ARTERIAL PATENCY WRIST A: POSITIVE
ARTERIAL PATENCY WRIST A: YES
AST SERPL-CCNC: 44 U/L (ref 15–37)
AST SERPL-CCNC: 84 U/L (ref 15–37)
AST SERPL-CCNC: >2000 U/L (ref 15–37)
ATRIAL RATE: 97 BPM
BACTERIA URNS QL MICRO: NEGATIVE /HPF
BASE DEFICIT BLD-SCNC: 17.1 MMOL/L
BASE DEFICIT BLD-SCNC: 23.5 MMOL/L
BASE DEFICIT BLD-SCNC: 3.7 MMOL/L
BASE DEFICIT BLDA-SCNC: 17.9 MMOL/L
BASOPHILS # BLD: 0 K/UL (ref 0–0.1)
BASOPHILS # BLD: 0 K/UL (ref 0–0.1)
BASOPHILS # BLD: 0.1 K/UL (ref 0–0.1)
BASOPHILS # BLD: 0.1 K/UL (ref 0–0.1)
BASOPHILS NFR BLD: 0 % (ref 0–1)
BASOPHILS NFR BLD: 0 % (ref 0–1)
BASOPHILS NFR BLD: 1 % (ref 0–1)
BASOPHILS NFR BLD: 1 % (ref 0–1)
BDY SITE: ABNORMAL
BILIRUB SERPL-MCNC: 1 MG/DL (ref 0.2–1)
BILIRUB SERPL-MCNC: 1.1 MG/DL (ref 0.2–1)
BILIRUB SERPL-MCNC: 1.2 MG/DL (ref 0.2–1)
BILIRUB UR QL: NEGATIVE
BUN SERPL-MCNC: 54 MG/DL (ref 6–20)
BUN SERPL-MCNC: 58 MG/DL (ref 6–20)
BUN SERPL-MCNC: 63 MG/DL (ref 6–20)
BUN/CREAT SERPL: 21 (ref 12–20)
BUN/CREAT SERPL: 24 (ref 12–20)
BUN/CREAT SERPL: 26 (ref 12–20)
CA-I BLD-SCNC: 0.86 MMOL/L (ref 1.12–1.32)
CA-I BLD-SCNC: 1.12 MMOL/L (ref 1.12–1.32)
CA-I BLD-SCNC: 1.31 MMOL/L (ref 1.12–1.32)
CALCIUM SERPL-MCNC: 10.5 MG/DL (ref 8.5–10.1)
CALCIUM SERPL-MCNC: 7.1 MG/DL (ref 8.5–10.1)
CALCIUM SERPL-MCNC: 8.9 MG/DL (ref 8.5–10.1)
CALCIUM SERPL-MCNC: 9.6 MG/DL (ref 8.5–10.1)
CALCULATED P AXIS, ECG09: 83 DEGREES
CALCULATED R AXIS, ECG10: 87 DEGREES
CALCULATED T AXIS, ECG11: 74 DEGREES
CHLORIDE SERPL-SCNC: 125 MMOL/L (ref 97–108)
CHLORIDE SERPL-SCNC: 127 MMOL/L (ref 97–108)
CHLORIDE SERPL-SCNC: 128 MMOL/L (ref 97–108)
CHOLEST SERPL-MCNC: 73 MG/DL
CO2 SERPL-SCNC: 10 MMOL/L (ref 21–32)
CO2 SERPL-SCNC: 13 MMOL/L (ref 21–32)
CO2 SERPL-SCNC: 21 MMOL/L (ref 21–32)
COLOR UR: ABNORMAL
COMMENT, HOLDF: NORMAL
COMMENT, HOLDF: NORMAL
CREAT SERPL-MCNC: 2.37 MG/DL (ref 0.55–1.02)
CREAT SERPL-MCNC: 2.42 MG/DL (ref 0.55–1.02)
CREAT SERPL-MCNC: 2.6 MG/DL (ref 0.55–1.02)
DIAGNOSIS, 93000: NORMAL
DIFFERENTIAL METHOD BLD: ABNORMAL
ECHO AO ROOT DIAM: 3.24 CM
ECHO AV PEAK GRADIENT: 4.51 MMHG
ECHO AV PEAK VELOCITY: 106.19 CM/S
ECHO EST RA PRESSURE: 8 MMHG
ECHO LA MAJOR AXIS: 2.14 CM
ECHO LA MINOR AXIS: 1.61 CM
ECHO LV E' LATERAL VELOCITY: 3.59 CM/S
ECHO LV E' SEPTAL VELOCITY: 3.16 CM/S
ECHO LV INTERNAL DIMENSION DIASTOLIC: 2.96 CM (ref 3.9–5.3)
ECHO LV INTERNAL DIMENSION SYSTOLIC: 2.15 CM
ECHO LV IVSD: 1.05 CM (ref 0.6–0.9)
ECHO LV MASS 2D: 103.6 G (ref 67–162)
ECHO LV MASS INDEX 2D: 77.9 G/M2 (ref 43–95)
ECHO LV POSTERIOR WALL DIASTOLIC: 1.3 CM (ref 0.6–0.9)
ECHO LVOT PEAK GRADIENT: 4.04 MMHG
ECHO LVOT PEAK VELOCITY: 100.52 CM/S
ECHO MV A VELOCITY: 113.33 CM/S
ECHO MV AREA PHT: 3.09 CM2
ECHO MV E DECELERATION TIME (DT): 245.37 MS
ECHO MV E VELOCITY: 74.55 CM/S
ECHO MV E/A RATIO: 0.66
ECHO MV E/E' LATERAL: 20.77
ECHO MV E/E' RATIO (AVERAGED): 22.18
ECHO MV E/E' SEPTAL: 23.59
ECHO MV PRESSURE HALF TIME (PHT): 71.16 MS
ECHO PV MAX VELOCITY: 88.54 CM/S
ECHO PV PEAK INSTANTANEOUS GRADIENT SYSTOLIC: 3.14 MMHG
ECHO RIGHT VENTRICULAR SYSTOLIC PRESSURE (RVSP): 30.61 MMHG
ECHO RV TAPSE: 1.29 CM (ref 1.5–2)
ECHO TV REGURGITANT MAX VELOCITY: 237.74 CM/S
ECHO TV REGURGITANT PEAK GRADIENT: 22.61 MMHG
EOSINOPHIL # BLD: 0 K/UL (ref 0–0.4)
EOSINOPHIL # BLD: 0.1 K/UL (ref 0–0.4)
EOSINOPHIL # BLD: 0.1 K/UL (ref 0–0.4)
EOSINOPHIL # BLD: 0.6 K/UL (ref 0–0.4)
EOSINOPHIL NFR BLD: 0 % (ref 0–7)
EOSINOPHIL NFR BLD: 1 % (ref 0–7)
EOSINOPHIL NFR BLD: 2 % (ref 0–7)
EOSINOPHIL NFR BLD: 7 % (ref 0–7)
EPITH CASTS URNS QL MICRO: ABNORMAL /LPF
ERYTHROCYTE [DISTWIDTH] IN BLOOD BY AUTOMATED COUNT: 17.5 % (ref 11.5–14.5)
ERYTHROCYTE [DISTWIDTH] IN BLOOD BY AUTOMATED COUNT: 19 % (ref 11.5–14.5)
ERYTHROCYTE [DISTWIDTH] IN BLOOD BY AUTOMATED COUNT: 19.6 % (ref 11.5–14.5)
ERYTHROCYTE [DISTWIDTH] IN BLOOD BY AUTOMATED COUNT: 19.7 % (ref 11.5–14.5)
ERYTHROCYTE [DISTWIDTH] IN BLOOD BY AUTOMATED COUNT: 20.1 % (ref 11.5–14.5)
ERYTHROCYTE [DISTWIDTH] IN BLOOD BY AUTOMATED COUNT: 20.2 % (ref 11.5–14.5)
FIBRINOGEN PPP-MCNC: <60 MG/DL (ref 200–475)
GAS FLOW.O2 O2 DELIVERY SYS: ABNORMAL L/MIN
GAS FLOW.O2 O2 DELIVERY SYS: ABNORMAL L/MIN
GAS FLOW.O2 SETTING OXYMISER: 20 BPM
GAS FLOW.O2 SETTING OXYMISER: 20 BPM
GLOBULIN SER CALC-MCNC: 1.7 G/DL (ref 2–4)
GLOBULIN SER CALC-MCNC: 3.4 G/DL (ref 2–4)
GLOBULIN SER CALC-MCNC: 4.6 G/DL (ref 2–4)
GLUCOSE BLD STRIP.AUTO-MCNC: 22 MG/DL (ref 65–117)
GLUCOSE BLD STRIP.AUTO-MCNC: 493 MG/DL (ref 65–117)
GLUCOSE BLD STRIP.AUTO-MCNC: >600 MG/DL (ref 65–117)
GLUCOSE SERPL-MCNC: 124 MG/DL (ref 65–100)
GLUCOSE SERPL-MCNC: 173 MG/DL (ref 65–100)
GLUCOSE SERPL-MCNC: 78 MG/DL (ref 65–100)
GLUCOSE UR STRIP.AUTO-MCNC: NEGATIVE MG/DL
GRAN CASTS URNS QL MICRO: ABNORMAL /LPF
HCO3 BLD-SCNC: 11.6 MMOL/L (ref 22–26)
HCO3 BLD-SCNC: 21.9 MMOL/L (ref 22–26)
HCO3 BLD-SCNC: 7.4 MMOL/L (ref 22–26)
HCO3 BLDA-SCNC: 10 MMOL/L (ref 22–26)
HCT VFR BLD AUTO: 12.6 % (ref 35–47)
HCT VFR BLD AUTO: 16.3 % (ref 35–47)
HCT VFR BLD AUTO: 32 % (ref 35–47)
HCT VFR BLD AUTO: 33.4 % (ref 35–47)
HCT VFR BLD AUTO: 38.6 % (ref 35–47)
HCT VFR BLD AUTO: 40.7 % (ref 35–47)
HDLC SERPL-MCNC: 40 MG/DL
HDLC SERPL: 1.8 {RATIO} (ref 0–5)
HGB BLD-MCNC: 10.2 G/DL (ref 11.5–16)
HGB BLD-MCNC: 11.9 G/DL (ref 11.5–16)
HGB BLD-MCNC: 12.8 G/DL (ref 11.5–16)
HGB BLD-MCNC: 3.7 G/DL (ref 11.5–16)
HGB BLD-MCNC: 5 G/DL (ref 11.5–16)
HGB BLD-MCNC: 9.9 G/DL (ref 11.5–16)
HGB UR QL STRIP: ABNORMAL
HISTORY CHECKED?,CKHIST: NORMAL
HISTORY CHECKED?,CKHIST: NORMAL
IMM GRANULOCYTES # BLD AUTO: 0 K/UL
IMM GRANULOCYTES # BLD AUTO: 0 K/UL
IMM GRANULOCYTES # BLD AUTO: 0 K/UL (ref 0–0.04)
IMM GRANULOCYTES # BLD AUTO: 0 K/UL (ref 0–0.04)
IMM GRANULOCYTES NFR BLD AUTO: 0 %
IMM GRANULOCYTES NFR BLD AUTO: 0 %
IMM GRANULOCYTES NFR BLD AUTO: 0 % (ref 0–0.5)
IMM GRANULOCYTES NFR BLD AUTO: 0 % (ref 0–0.5)
INR PPP: 4.1 (ref 0.9–1.1)
INR PPP: 8 (ref 0.9–1.1)
KETONES UR QL STRIP.AUTO: NEGATIVE MG/DL
LACTATE BLD-SCNC: 2.08 MMOL/L (ref 0.4–2)
LACTATE SERPL-SCNC: 19.4 MMOL/L (ref 0.4–2)
LACTATE SERPL-SCNC: 2.8 MMOL/L (ref 0.4–2)
LACTATE SERPL-SCNC: 2.8 MMOL/L (ref 0.4–2)
LACTATE SERPL-SCNC: 22 MMOL/L (ref 0.4–2)
LACTATE SERPL-SCNC: 5 MMOL/L (ref 0.4–2)
LACTATE SERPL-SCNC: 5.4 MMOL/L (ref 0.4–2)
LACTATE SERPL-SCNC: 8.6 MMOL/L (ref 0.4–2)
LDLC SERPL CALC-MCNC: 18.2 MG/DL (ref 0–100)
LEUKOCYTE ESTERASE UR QL STRIP.AUTO: NEGATIVE
LIPASE SERPL-CCNC: 1318 U/L (ref 73–393)
LYMPHOCYTES # BLD: 0.7 K/UL (ref 0.8–3.5)
LYMPHOCYTES # BLD: 1 K/UL (ref 0.8–3.5)
LYMPHOCYTES # BLD: 1.9 K/UL (ref 0.8–3.5)
LYMPHOCYTES # BLD: 3 K/UL (ref 0.8–3.5)
LYMPHOCYTES NFR BLD: 13 % (ref 12–49)
LYMPHOCYTES NFR BLD: 14 % (ref 12–49)
LYMPHOCYTES NFR BLD: 31 % (ref 12–49)
LYMPHOCYTES NFR BLD: 35 % (ref 12–49)
MAGNESIUM SERPL-MCNC: 2 MG/DL (ref 1.6–2.4)
MAGNESIUM SERPL-MCNC: 2.2 MG/DL (ref 1.6–2.4)
MCH RBC QN AUTO: 20.7 PG (ref 26–34)
MCH RBC QN AUTO: 20.9 PG (ref 26–34)
MCH RBC QN AUTO: 21 PG (ref 26–34)
MCH RBC QN AUTO: 21.1 PG (ref 26–34)
MCH RBC QN AUTO: 21.2 PG (ref 26–34)
MCH RBC QN AUTO: 28.2 PG (ref 26–34)
MCHC RBC AUTO-ENTMCNC: 29.4 G/DL (ref 30–36.5)
MCHC RBC AUTO-ENTMCNC: 30.5 G/DL (ref 30–36.5)
MCHC RBC AUTO-ENTMCNC: 30.7 G/DL (ref 30–36.5)
MCHC RBC AUTO-ENTMCNC: 30.8 G/DL (ref 30–36.5)
MCHC RBC AUTO-ENTMCNC: 30.9 G/DL (ref 30–36.5)
MCHC RBC AUTO-ENTMCNC: 31.4 G/DL (ref 30–36.5)
MCV RBC AUTO: 67.1 FL (ref 80–99)
MCV RBC AUTO: 67.5 FL (ref 80–99)
MCV RBC AUTO: 68.6 FL (ref 80–99)
MCV RBC AUTO: 68.8 FL (ref 80–99)
MCV RBC AUTO: 71.6 FL (ref 80–99)
MCV RBC AUTO: 91.2 FL (ref 80–99)
METAMYELOCYTES NFR BLD MANUAL: 1 %
METAMYELOCYTES NFR BLD MANUAL: 3 %
MONOCYTES # BLD: 0.1 K/UL (ref 0–1)
MONOCYTES # BLD: 0.1 K/UL (ref 0–1)
MONOCYTES # BLD: 0.3 K/UL (ref 0–1)
MONOCYTES # BLD: 0.4 K/UL (ref 0–1)
MONOCYTES NFR BLD: 1 % (ref 5–13)
MONOCYTES NFR BLD: 2 % (ref 5–13)
MONOCYTES NFR BLD: 5 % (ref 5–13)
MONOCYTES NFR BLD: 5 % (ref 5–13)
MYELOCYTES NFR BLD MANUAL: 1 %
NEUTS BAND NFR BLD MANUAL: 12 % (ref 0–6)
NEUTS BAND NFR BLD MANUAL: 15 % (ref 0–6)
NEUTS SEG # BLD: 3.8 K/UL (ref 1.8–8)
NEUTS SEG # BLD: 4.4 K/UL (ref 1.8–8)
NEUTS SEG # BLD: 4.4 K/UL (ref 1.8–8)
NEUTS SEG # BLD: 5.6 K/UL (ref 1.8–8)
NEUTS SEG NFR BLD: 52 % (ref 32–75)
NEUTS SEG NFR BLD: 61 % (ref 32–75)
NEUTS SEG NFR BLD: 67 % (ref 32–75)
NEUTS SEG NFR BLD: 70 % (ref 32–75)
NITRITE UR QL STRIP.AUTO: NEGATIVE
NRBC # BLD: 0 K/UL (ref 0–0.01)
NRBC # BLD: 0 K/UL (ref 0–0.01)
NRBC # BLD: 0.02 K/UL (ref 0–0.01)
NRBC # BLD: 0.06 K/UL (ref 0–0.01)
NRBC # BLD: 0.08 K/UL (ref 0–0.01)
NRBC # BLD: 0.09 K/UL (ref 0–0.01)
NRBC BLD-RTO: 0 PER 100 WBC
NRBC BLD-RTO: 0 PER 100 WBC
NRBC BLD-RTO: 0.2 PER 100 WBC
NRBC BLD-RTO: 1.3 PER 100 WBC
NRBC BLD-RTO: 1.4 PER 100 WBC
NRBC BLD-RTO: 1.5 PER 100 WBC
O2/TOTAL GAS SETTING VFR VENT: 100 %
O2/TOTAL GAS SETTING VFR VENT: 40 %
P-R INTERVAL, ECG05: 122 MS
PCO2 BLD: 35.2 MMHG (ref 35–45)
PCO2 BLD: 40.8 MMHG (ref 35–45)
PCO2 BLD: 41 MMHG (ref 35–45)
PCO2 BLDA: 29 MMHG (ref 35–45)
PEEP RESPIRATORY: 5 CMH2O
PEEP RESPIRATORY: 5 CMH2O
PH BLD: 6.93 [PH] (ref 7.35–7.45)
PH BLD: 7.06 [PH] (ref 7.35–7.45)
PH BLD: 7.34 [PH] (ref 7.35–7.45)
PH BLDA: 7.14 [PH] (ref 7.35–7.45)
PH UR STRIP: 5 [PH] (ref 5–8)
PHOSPHATE SERPL-MCNC: 4.7 MG/DL (ref 2.6–4.7)
PLATELET # BLD AUTO: 103 K/UL (ref 150–400)
PLATELET # BLD AUTO: 14 K/UL (ref 150–400)
PLATELET # BLD AUTO: 156 K/UL (ref 150–400)
PLATELET # BLD AUTO: 177 K/UL (ref 150–400)
PLATELET # BLD AUTO: 37 K/UL (ref 150–400)
PLATELET # BLD AUTO: 46 K/UL (ref 150–400)
PLATELET COMMENTS,PCOM: ABNORMAL
PO2 BLD: 253 MMHG (ref 80–100)
PO2 BLD: 37 MMHG (ref 80–100)
PO2 BLD: 89 MMHG (ref 80–100)
PO2 BLDA: 437 MMHG (ref 80–100)
POTASSIUM SERPL-SCNC: 4.2 MMOL/L (ref 3.5–5.1)
POTASSIUM SERPL-SCNC: 4.2 MMOL/L (ref 3.5–5.1)
POTASSIUM SERPL-SCNC: 5 MMOL/L (ref 3.5–5.1)
PROCALCITONIN SERPL-MCNC: 0.25 NG/ML
PROT SERPL-MCNC: 3.2 G/DL (ref 6.4–8.2)
PROT SERPL-MCNC: 5.6 G/DL (ref 6.4–8.2)
PROT SERPL-MCNC: 7.4 G/DL (ref 6.4–8.2)
PROT UR STRIP-MCNC: 30 MG/DL
PROTHROMBIN TIME: 40.1 SEC (ref 9–11.1)
PROTHROMBIN TIME: 76 SEC (ref 9–11.1)
PTH-INTACT SERPL-MCNC: 116.1 PG/ML (ref 18.4–88)
Q-T INTERVAL, ECG07: 356 MS
QRS DURATION, ECG06: 84 MS
QTC CALCULATION (BEZET), ECG08: 452 MS
RBC # BLD AUTO: 1.76 M/UL (ref 3.8–5.2)
RBC # BLD AUTO: 2.37 M/UL (ref 3.8–5.2)
RBC # BLD AUTO: 3.51 M/UL (ref 3.8–5.2)
RBC # BLD AUTO: 4.87 M/UL (ref 3.8–5.2)
RBC # BLD AUTO: 5.75 M/UL (ref 3.8–5.2)
RBC # BLD AUTO: 6.03 M/UL (ref 3.8–5.2)
RBC #/AREA URNS HPF: ABNORMAL /HPF (ref 0–5)
RBC MORPH BLD: ABNORMAL
SAMPLES BEING HELD,HOLD: NORMAL
SAMPLES BEING HELD,HOLD: NORMAL
SAO2 % BLD: 100 % (ref 92–97)
SAO2 % BLD: 67.2 % (ref 92–97)
SAO2 % BLD: 92.1 % (ref 92–97)
SAO2 % BLD: 99.4 % (ref 92–97)
SAO2% DEVICE SAO2% SENSOR NAME: ABNORMAL
SARS-COV-2, COV2: NORMAL
SARS-COV-2, XPLCVT: NOT DETECTED
SERVICE CMNT-IMP: ABNORMAL
SERVICE CMNT-IMP: NORMAL
SODIUM SERPL-SCNC: 153 MMOL/L (ref 136–145)
SODIUM SERPL-SCNC: 156 MMOL/L (ref 136–145)
SODIUM SERPL-SCNC: 159 MMOL/L (ref 136–145)
SOURCE, COVRS: NORMAL
SP GR UR REFRACTOMETRY: 1.02 (ref 1–1.03)
SPECIMEN SITE: ABNORMAL
SPECIMEN TYPE: ABNORMAL
TRIGL SERPL-MCNC: 74 MG/DL (ref ?–150)
TROPONIN I SERPL-MCNC: 0.13 NG/ML
TROPONIN I SERPL-MCNC: 0.14 NG/ML
TROPONIN I SERPL-MCNC: 0.2 NG/ML
UR CULT HOLD, URHOLD: NORMAL
UROBILINOGEN UR QL STRIP.AUTO: 0.2 EU/DL (ref 0.2–1)
VENTILATION MODE VENT: ABNORMAL
VENTILATION MODE VENT: ABNORMAL
VENTRICULAR RATE, ECG03: 97 BPM
VLDLC SERPL CALC-MCNC: 14.8 MG/DL
VT SETTING VENT: 365 ML
VT SETTING VENT: 365 ML
WBC # BLD AUTO: 11.2 K/UL (ref 3.6–11)
WBC # BLD AUTO: 4.4 K/UL (ref 3.6–11)
WBC # BLD AUTO: 5.4 K/UL (ref 3.6–11)
WBC # BLD AUTO: 6.2 K/UL (ref 3.6–11)
WBC # BLD AUTO: 6.8 K/UL (ref 3.6–11)
WBC # BLD AUTO: 8.5 K/UL (ref 3.6–11)
WBC URNS QL MICRO: ABNORMAL /HPF (ref 0–4)

## 2021-01-01 PROCEDURE — 03HY32Z INSERTION OF MONITORING DEVICE INTO UPPER ARTERY, PERCUTANEOUS APPROACH: ICD-10-PCS | Performed by: EMERGENCY MEDICINE

## 2021-01-01 PROCEDURE — 74011000250 HC RX REV CODE- 250: Performed by: FAMILY MEDICINE

## 2021-01-01 PROCEDURE — 30233K1 TRANSFUSION OF NONAUTOLOGOUS FROZEN PLASMA INTO PERIPHERAL VEIN, PERCUTANEOUS APPROACH: ICD-10-PCS | Performed by: EMERGENCY MEDICINE

## 2021-01-01 PROCEDURE — 74011000250 HC RX REV CODE- 250: Performed by: ANESTHESIOLOGY

## 2021-01-01 PROCEDURE — 80053 COMPREHEN METABOLIC PANEL: CPT

## 2021-01-01 PROCEDURE — 85025 COMPLETE CBC W/AUTO DIFF WBC: CPT

## 2021-01-01 PROCEDURE — 74011000250 HC RX REV CODE- 250: Performed by: EMERGENCY MEDICINE

## 2021-01-01 PROCEDURE — 51798 US URINE CAPACITY MEASURE: CPT

## 2021-01-01 PROCEDURE — 93005 ELECTROCARDIOGRAM TRACING: CPT

## 2021-01-01 PROCEDURE — 36415 COLL VENOUS BLD VENIPUNCTURE: CPT

## 2021-01-01 PROCEDURE — 74011250637 HC RX REV CODE- 250/637: Performed by: FAMILY MEDICINE

## 2021-01-01 PROCEDURE — 82803 BLOOD GASES ANY COMBINATION: CPT

## 2021-01-01 PROCEDURE — 70450 CT HEAD/BRAIN W/O DYE: CPT

## 2021-01-01 PROCEDURE — 86901 BLOOD TYPING SEROLOGIC RH(D): CPT

## 2021-01-01 PROCEDURE — 86923 COMPATIBILITY TEST ELECTRIC: CPT

## 2021-01-01 PROCEDURE — 74011000250 HC RX REV CODE- 250: Performed by: INTERNAL MEDICINE

## 2021-01-01 PROCEDURE — 77030000299 HC FEMSTP COMP GLD STJU -B

## 2021-01-01 PROCEDURE — 71045 X-RAY EXAM CHEST 1 VIEW: CPT

## 2021-01-01 PROCEDURE — P9045 ALBUMIN (HUMAN), 5%, 250 ML: HCPCS | Performed by: EMERGENCY MEDICINE

## 2021-01-01 PROCEDURE — 30233N1 TRANSFUSION OF NONAUTOLOGOUS RED BLOOD CELLS INTO PERIPHERAL VEIN, PERCUTANEOUS APPROACH: ICD-10-PCS | Performed by: EMERGENCY MEDICINE

## 2021-01-01 PROCEDURE — 99285 EMERGENCY DEPT VISIT HI MDM: CPT

## 2021-01-01 PROCEDURE — 74011250636 HC RX REV CODE- 250/636: Performed by: HOSPITALIST

## 2021-01-01 PROCEDURE — 74176 CT ABD & PELVIS W/O CONTRAST: CPT

## 2021-01-01 PROCEDURE — P9059 PLASMA, FRZ BETWEEN 8-24HOUR: HCPCS

## 2021-01-01 PROCEDURE — 2709999900 HC NON-CHARGEABLE SUPPLY

## 2021-01-01 PROCEDURE — 74011250636 HC RX REV CODE- 250/636: Performed by: EMERGENCY MEDICINE

## 2021-01-01 PROCEDURE — 77030029065 HC DRSG HEMO QCLOT ZMED -B

## 2021-01-01 PROCEDURE — C9113 INJ PANTOPRAZOLE SODIUM, VIA: HCPCS | Performed by: EMERGENCY MEDICINE

## 2021-01-01 PROCEDURE — 84145 PROCALCITONIN (PCT): CPT

## 2021-01-01 PROCEDURE — 96361 HYDRATE IV INFUSION ADD-ON: CPT

## 2021-01-01 PROCEDURE — P9045 ALBUMIN (HUMAN), 5%, 250 ML: HCPCS | Performed by: INTERNAL MEDICINE

## 2021-01-01 PROCEDURE — 82306 VITAMIN D 25 HYDROXY: CPT

## 2021-01-01 PROCEDURE — 83605 ASSAY OF LACTIC ACID: CPT

## 2021-01-01 PROCEDURE — 84484 ASSAY OF TROPONIN QUANT: CPT

## 2021-01-01 PROCEDURE — 96374 THER/PROPH/DIAG INJ IV PUSH: CPT

## 2021-01-01 PROCEDURE — 74011000258 HC RX REV CODE- 258: Performed by: INTERNAL MEDICINE

## 2021-01-01 PROCEDURE — 74011000258 HC RX REV CODE- 258: Performed by: EMERGENCY MEDICINE

## 2021-01-01 PROCEDURE — 0W9930Z DRAINAGE OF RIGHT PLEURAL CAVITY WITH DRAINAGE DEVICE, PERCUTANEOUS APPROACH: ICD-10-PCS | Performed by: EMERGENCY MEDICINE

## 2021-01-01 PROCEDURE — 30233M1 TRANSFUSION OF NONAUTOLOGOUS PLASMA CRYOPRECIPITATE INTO PERIPHERAL VEIN, PERCUTANEOUS APPROACH: ICD-10-PCS | Performed by: EMERGENCY MEDICINE

## 2021-01-01 PROCEDURE — 83735 ASSAY OF MAGNESIUM: CPT

## 2021-01-01 PROCEDURE — 85384 FIBRINOGEN ACTIVITY: CPT

## 2021-01-01 PROCEDURE — 77030034540

## 2021-01-01 PROCEDURE — 93306 TTE W/DOPPLER COMPLETE: CPT

## 2021-01-01 PROCEDURE — 74011250636 HC RX REV CODE- 250/636: Performed by: FAMILY MEDICINE

## 2021-01-01 PROCEDURE — 82140 ASSAY OF AMMONIA: CPT

## 2021-01-01 PROCEDURE — 77030013797 HC KT TRNSDUC PRSSR EDWD -A

## 2021-01-01 PROCEDURE — C1751 CATH, INF, PER/CENT/MIDLINE: HCPCS

## 2021-01-01 PROCEDURE — U0005 INFEC AGEN DETEC AMPLI PROBE: HCPCS

## 2021-01-01 PROCEDURE — 31500 INSERT EMERGENCY AIRWAY: CPT

## 2021-01-01 PROCEDURE — P9100 PATHOGEN TEST FOR PLATELETS: HCPCS

## 2021-01-01 PROCEDURE — 0BH17EZ INSERTION OF ENDOTRACHEAL AIRWAY INTO TRACHEA, VIA NATURAL OR ARTIFICIAL OPENING: ICD-10-PCS | Performed by: EMERGENCY MEDICINE

## 2021-01-01 PROCEDURE — 36430 TRANSFUSION BLD/BLD COMPNT: CPT

## 2021-01-01 PROCEDURE — 74011000250 HC RX REV CODE- 250: Performed by: HOSPITALIST

## 2021-01-01 PROCEDURE — 02HV33Z INSERTION OF INFUSION DEVICE INTO SUPERIOR VENA CAVA, PERCUTANEOUS APPROACH: ICD-10-PCS | Performed by: EMERGENCY MEDICINE

## 2021-01-01 PROCEDURE — 84100 ASSAY OF PHOSPHORUS: CPT

## 2021-01-01 PROCEDURE — 96375 TX/PRO/DX INJ NEW DRUG ADDON: CPT

## 2021-01-01 PROCEDURE — 93306 TTE W/DOPPLER COMPLETE: CPT | Performed by: SPECIALIST

## 2021-01-01 PROCEDURE — P9012 CRYOPRECIPITATE EACH UNIT: HCPCS

## 2021-01-01 PROCEDURE — 82962 GLUCOSE BLOOD TEST: CPT

## 2021-01-01 PROCEDURE — 83690 ASSAY OF LIPASE: CPT

## 2021-01-01 PROCEDURE — 74011250636 HC RX REV CODE- 250/636: Performed by: NURSE PRACTITIONER

## 2021-01-01 PROCEDURE — 85610 PROTHROMBIN TIME: CPT

## 2021-01-01 PROCEDURE — 82330 ASSAY OF CALCIUM: CPT

## 2021-01-01 PROCEDURE — 30233R1 TRANSFUSION OF NONAUTOLOGOUS PLATELETS INTO PERIPHERAL VEIN, PERCUTANEOUS APPROACH: ICD-10-PCS | Performed by: EMERGENCY MEDICINE

## 2021-01-01 PROCEDURE — 83970 ASSAY OF PARATHORMONE: CPT

## 2021-01-01 PROCEDURE — 87040 BLOOD CULTURE FOR BACTERIA: CPT

## 2021-01-01 PROCEDURE — 74011250636 HC RX REV CODE- 250/636: Performed by: INTERNAL MEDICINE

## 2021-01-01 PROCEDURE — P9016 RBC LEUKOCYTES REDUCED: HCPCS

## 2021-01-01 PROCEDURE — 74011000250 HC RX REV CODE- 250

## 2021-01-01 PROCEDURE — 74011000258 HC RX REV CODE- 258: Performed by: HOSPITALIST

## 2021-01-01 PROCEDURE — 65660000001 HC RM ICU INTERMED STEPDOWN

## 2021-01-01 PROCEDURE — 81001 URINALYSIS AUTO W/SCOPE: CPT

## 2021-01-01 PROCEDURE — 94640 AIRWAY INHALATION TREATMENT: CPT

## 2021-01-01 PROCEDURE — 94002 VENT MGMT INPAT INIT DAY: CPT

## 2021-01-01 PROCEDURE — 5A12012 PERFORMANCE OF CARDIAC OUTPUT, SINGLE, MANUAL: ICD-10-PCS | Performed by: EMERGENCY MEDICINE

## 2021-01-01 PROCEDURE — P9035 PLATELET PHERES LEUKOREDUCED: HCPCS

## 2021-01-01 PROCEDURE — 65620000000 HC RM CCU GENERAL

## 2021-01-01 PROCEDURE — 85027 COMPLETE CBC AUTOMATED: CPT

## 2021-01-01 PROCEDURE — 77030005402 HC CATH RAD ART LN KT TELE -B

## 2021-01-01 PROCEDURE — C1894 INTRO/SHEATH, NON-LASER: HCPCS

## 2021-01-01 PROCEDURE — 80061 LIPID PANEL: CPT

## 2021-01-01 PROCEDURE — 36600 WITHDRAWAL OF ARTERIAL BLOOD: CPT

## 2021-01-01 PROCEDURE — 5A1935Z RESPIRATORY VENTILATION, LESS THAN 24 CONSECUTIVE HOURS: ICD-10-PCS | Performed by: EMERGENCY MEDICINE

## 2021-01-01 RX ORDER — ISOSORBIDE MONONITRATE 30 MG/1
30 TABLET, EXTENDED RELEASE ORAL DAILY
COMMUNITY
Start: 2021-01-01

## 2021-01-01 RX ORDER — ALBUTEROL SULFATE 0.83 MG/ML
2.5 SOLUTION RESPIRATORY (INHALATION)
Status: DISCONTINUED | OUTPATIENT
Start: 2021-01-01 | End: 2021-01-01

## 2021-01-01 RX ORDER — DOPAMINE HYDROCHLORIDE 320 MG/100ML
0-20 INJECTION, SOLUTION INTRAVENOUS
Status: DISCONTINUED | OUTPATIENT
Start: 2021-01-01 | End: 2021-10-07

## 2021-01-01 RX ORDER — LEVALBUTEROL INHALATION SOLUTION 0.63 MG/3ML
SOLUTION RESPIRATORY (INHALATION)
COMMUNITY
Start: 2021-01-01

## 2021-01-01 RX ORDER — SODIUM BICARBONATE 1 MEQ/ML
100 SYRINGE (ML) INTRAVENOUS ONCE
Status: COMPLETED | OUTPATIENT
Start: 2021-01-01 | End: 2021-01-01

## 2021-01-01 RX ORDER — SODIUM BICARBONATE 1 MEQ/ML
200 SYRINGE (ML) INTRAVENOUS ONCE
Status: COMPLETED | OUTPATIENT
Start: 2021-01-01 | End: 2021-01-01

## 2021-01-01 RX ORDER — SODIUM CHLORIDE, SODIUM LACTATE, POTASSIUM CHLORIDE, CALCIUM CHLORIDE 600; 310; 30; 20 MG/100ML; MG/100ML; MG/100ML; MG/100ML
500 INJECTION, SOLUTION INTRAVENOUS CONTINUOUS
Status: DISPENSED | OUTPATIENT
Start: 2021-01-01 | End: 2021-01-01

## 2021-01-01 RX ORDER — SODIUM BICARBONATE 1 MEQ/ML
50 SYRINGE (ML) INTRAVENOUS
Status: COMPLETED | OUTPATIENT
Start: 2021-01-01 | End: 2021-01-01

## 2021-01-01 RX ORDER — ONDANSETRON 2 MG/ML
4 INJECTION INTRAMUSCULAR; INTRAVENOUS
Status: COMPLETED | OUTPATIENT
Start: 2021-01-01 | End: 2021-01-01

## 2021-01-01 RX ORDER — SODIUM CHLORIDE 0.9 % (FLUSH) 0.9 %
5-10 SYRINGE (ML) INJECTION AS NEEDED
Status: DISCONTINUED | OUTPATIENT
Start: 2021-01-01 | End: 2021-10-07 | Stop reason: HOSPADM

## 2021-01-01 RX ORDER — ACETYLCYSTEINE 200 MG/ML
200 SOLUTION ORAL; RESPIRATORY (INHALATION)
Status: DISCONTINUED | OUTPATIENT
Start: 2021-01-01 | End: 2021-10-07

## 2021-01-01 RX ORDER — METOPROLOL TARTRATE 25 MG/1
TABLET, FILM COATED ORAL
COMMUNITY
Start: 2021-01-01

## 2021-01-01 RX ORDER — MORPHINE SULFATE 2 MG/ML
1 INJECTION, SOLUTION INTRAMUSCULAR; INTRAVENOUS
Status: DISCONTINUED | OUTPATIENT
Start: 2021-01-01 | End: 2021-10-07 | Stop reason: HOSPADM

## 2021-01-01 RX ORDER — SODIUM CHLORIDE 450 MG/100ML
500 INJECTION, SOLUTION INTRAVENOUS ONCE
Status: COMPLETED | OUTPATIENT
Start: 2021-01-01 | End: 2021-01-01

## 2021-01-01 RX ORDER — NITROGLYCERIN 0.3 MG/1
0.3 TABLET SUBLINGUAL
COMMUNITY

## 2021-01-01 RX ORDER — ALBUMIN HUMAN 50 G/1000ML
25 SOLUTION INTRAVENOUS ONCE
Status: COMPLETED | OUTPATIENT
Start: 2021-01-01 | End: 2021-01-01

## 2021-01-01 RX ORDER — LEVOTHYROXINE SODIUM 50 UG/1
88 TABLET ORAL
COMMUNITY
Start: 2021-01-01

## 2021-01-01 RX ORDER — TELMISARTAN 80 MG/1
80 TABLET ORAL DAILY
COMMUNITY
Start: 2021-01-01

## 2021-01-01 RX ORDER — HYDROCORTISONE SODIUM SUCCINATE 100 MG/2ML
100 INJECTION, POWDER, FOR SOLUTION INTRAMUSCULAR; INTRAVENOUS EVERY 8 HOURS
Status: DISCONTINUED | OUTPATIENT
Start: 2021-01-01 | End: 2021-10-07 | Stop reason: HOSPADM

## 2021-01-01 RX ORDER — SODIUM CHLORIDE 9 MG/ML
250 INJECTION, SOLUTION INTRAVENOUS AS NEEDED
Status: DISCONTINUED | OUTPATIENT
Start: 2021-01-01 | End: 2021-10-07 | Stop reason: HOSPADM

## 2021-01-01 RX ORDER — SODIUM CHLORIDE 0.9 % (FLUSH) 0.9 %
5-40 SYRINGE (ML) INJECTION EVERY 8 HOURS
Status: DISCONTINUED | OUTPATIENT
Start: 2021-01-01 | End: 2021-10-07

## 2021-01-01 RX ORDER — IPRATROPIUM BROMIDE AND ALBUTEROL SULFATE 2.5; .5 MG/3ML; MG/3ML
3 SOLUTION RESPIRATORY (INHALATION)
Status: DISCONTINUED | OUTPATIENT
Start: 2021-01-01 | End: 2021-01-01

## 2021-01-01 RX ORDER — GUAIFENESIN 100 MG/5ML
81 LIQUID (ML) ORAL DAILY
COMMUNITY

## 2021-01-01 RX ORDER — SODIUM BICARBONATE 1 MEQ/ML
SYRINGE (ML) INTRAVENOUS
Status: COMPLETED
Start: 2021-01-01 | End: 2021-01-01

## 2021-01-01 RX ORDER — SODIUM BICARBONATE IN D5W 150/1000ML
PLASTIC BAG, INJECTION (ML) INTRAVENOUS CONTINUOUS
Status: DISCONTINUED | OUTPATIENT
Start: 2021-01-01 | End: 2021-10-07

## 2021-01-01 RX ORDER — EPINEPHRINE 1 MG/ML
1 INJECTION, SOLUTION, CONCENTRATE INTRAVENOUS ONCE
Status: ACTIVE | OUTPATIENT
Start: 2021-01-01 | End: 2021-01-01

## 2021-01-01 RX ORDER — ACETYLCYSTEINE 200 MG/ML
200 SOLUTION ORAL; RESPIRATORY (INHALATION) EVERY 6 HOURS
Status: DISCONTINUED | OUTPATIENT
Start: 2021-01-01 | End: 2021-01-01

## 2021-01-01 RX ORDER — CALCIUM GLUCONATE 20 MG/ML
2 INJECTION, SOLUTION INTRAVENOUS ONCE
Status: COMPLETED | OUTPATIENT
Start: 2021-01-01 | End: 2021-01-01

## 2021-01-01 RX ORDER — FENTANYL CITRATE 50 UG/ML
50 INJECTION, SOLUTION INTRAMUSCULAR; INTRAVENOUS
Status: COMPLETED | OUTPATIENT
Start: 2021-01-01 | End: 2021-01-01

## 2021-01-01 RX ORDER — NOREPINEPHRINE BITARTRATE/D5W 8 MG/250ML
.5-16 PLASTIC BAG, INJECTION (ML) INTRAVENOUS
Status: DISCONTINUED | OUTPATIENT
Start: 2021-01-01 | End: 2021-01-01

## 2021-01-01 RX ORDER — AMLODIPINE BESYLATE 2.5 MG/1
TABLET ORAL
COMMUNITY
Start: 2021-01-01

## 2021-01-01 RX ORDER — SODIUM CHLORIDE 0.9 % (FLUSH) 0.9 %
5-40 SYRINGE (ML) INJECTION AS NEEDED
Status: DISCONTINUED | OUTPATIENT
Start: 2021-01-01 | End: 2021-10-07 | Stop reason: HOSPADM

## 2021-01-01 RX ORDER — ONDANSETRON 2 MG/ML
4 INJECTION INTRAMUSCULAR; INTRAVENOUS
Status: DISCONTINUED | OUTPATIENT
Start: 2021-01-01 | End: 2021-10-07 | Stop reason: HOSPADM

## 2021-01-01 RX ORDER — CHLORHEXIDINE GLUCONATE 0.12 MG/ML
15 RINSE ORAL EVERY 12 HOURS
Status: DISCONTINUED | OUTPATIENT
Start: 2021-01-01 | End: 2021-10-07

## 2021-01-01 RX ORDER — ATORVASTATIN CALCIUM 40 MG/1
40 TABLET, FILM COATED ORAL
COMMUNITY
Start: 2021-01-01

## 2021-01-01 RX ORDER — CALCIUM GLUCONATE 20 MG/ML
1 INJECTION, SOLUTION INTRAVENOUS ONCE
Status: DISCONTINUED | OUTPATIENT
Start: 2021-01-01 | End: 2021-01-01

## 2021-01-01 RX ORDER — IPRATROPIUM BROMIDE AND ALBUTEROL SULFATE 2.5; .5 MG/3ML; MG/3ML
3 SOLUTION RESPIRATORY (INHALATION)
Status: DISCONTINUED | OUTPATIENT
Start: 2021-01-01 | End: 2021-10-07

## 2021-01-01 RX ORDER — PEAK FLOW METER
EACH MISCELLANEOUS SEE ADMIN INSTRUCTIONS
COMMUNITY
Start: 2021-01-01

## 2021-01-01 RX ORDER — NOREPINEPHRINE BITARTRATE/D5W 8 MG/250ML
.5-3 PLASTIC BAG, INJECTION (ML) INTRAVENOUS
Status: DISCONTINUED | OUTPATIENT
Start: 2021-01-01 | End: 2021-10-07

## 2021-01-01 RX ORDER — ASPIRIN 300 MG/1
75 SUPPOSITORY RECTAL DAILY
Status: DISCONTINUED | OUTPATIENT
Start: 2021-01-01 | End: 2021-10-07

## 2021-01-01 RX ORDER — HYDRALAZINE HYDROCHLORIDE 20 MG/ML
10 INJECTION INTRAMUSCULAR; INTRAVENOUS
Status: DISCONTINUED | OUTPATIENT
Start: 2021-01-01 | End: 2021-10-07 | Stop reason: HOSPADM

## 2021-01-01 RX ORDER — HEPARIN SODIUM 5000 [USP'U]/ML
5000 INJECTION, SOLUTION INTRAVENOUS; SUBCUTANEOUS EVERY 12 HOURS
Status: DISCONTINUED | OUTPATIENT
Start: 2021-01-01 | End: 2021-01-01

## 2021-01-01 RX ORDER — SODIUM CHLORIDE 9 MG/ML
3 INJECTION, SOLUTION INTRAVENOUS CONTINUOUS
Status: DISCONTINUED | OUTPATIENT
Start: 2021-01-01 | End: 2021-10-07

## 2021-01-01 RX ORDER — CALCIUM GLUCONATE 20 MG/ML
1 INJECTION, SOLUTION INTRAVENOUS ONCE
Status: DISCONTINUED | OUTPATIENT
Start: 2021-01-01 | End: 2021-01-01 | Stop reason: SDUPTHER

## 2021-01-01 RX ADMIN — NOREPINEPHRINE BITARTRATE 20 MCG/MIN: 1 SOLUTION INTRAVENOUS at 10:02

## 2021-01-01 RX ADMIN — SODIUM CHLORIDE 500 ML/HR: 450 INJECTION, SOLUTION INTRAVENOUS at 16:30

## 2021-01-01 RX ADMIN — SODIUM CHLORIDE: 450 INJECTION, SOLUTION INTRAVENOUS at 03:04

## 2021-01-01 RX ADMIN — CALCIUM GLUCONATE 2 G: 20 INJECTION, SOLUTION INTRAVENOUS at 18:03

## 2021-01-01 RX ADMIN — NOREPINEPHRINE BITARTRATE 30 MCG/MIN: 1 SOLUTION INTRAVENOUS at 14:05

## 2021-01-01 RX ADMIN — FENTANYL CITRATE 50 MCG: 50 INJECTION INTRAMUSCULAR; INTRAVENOUS at 11:18

## 2021-01-01 RX ADMIN — Medication 10 ML: at 16:15

## 2021-01-01 RX ADMIN — SODIUM CHLORIDE: 450 INJECTION, SOLUTION INTRAVENOUS at 16:18

## 2021-01-01 RX ADMIN — EPINEPHRINE 1 MCG/MIN: 1 INJECTION INTRAMUSCULAR; INTRAVENOUS; SUBCUTANEOUS at 10:43

## 2021-01-01 RX ADMIN — HYDROCORTISONE SODIUM SUCCINATE 100 MG: 100 INJECTION, POWDER, FOR SOLUTION INTRAMUSCULAR; INTRAVENOUS at 18:34

## 2021-01-01 RX ADMIN — VASOPRESSIN 0.04 UNITS/MIN: 20 INJECTION INTRAVENOUS at 17:37

## 2021-01-01 RX ADMIN — SODIUM BICARBONATE 200 MEQ: 84 INJECTION, SOLUTION INTRAVENOUS at 17:27

## 2021-01-01 RX ADMIN — SODIUM BICARBONATE 100 MEQ: 84 INJECTION, SOLUTION INTRAVENOUS at 09:48

## 2021-01-01 RX ADMIN — Medication 10 ML: at 13:32

## 2021-01-01 RX ADMIN — Medication 10 ML: at 21:26

## 2021-01-01 RX ADMIN — EPINEPHRINE 15 MCG/MIN: 1 INJECTION INTRAMUSCULAR; INTRAVENOUS; SUBCUTANEOUS at 20:14

## 2021-01-01 RX ADMIN — PHYTONADIONE 10 MG: 10 INJECTION, EMULSION INTRAMUSCULAR; INTRAVENOUS; SUBCUTANEOUS at 12:21

## 2021-01-01 RX ADMIN — Medication: at 21:34

## 2021-01-01 RX ADMIN — HYDROCORTISONE SODIUM SUCCINATE 100 MG: 100 INJECTION, POWDER, FOR SOLUTION INTRAMUSCULAR; INTRAVENOUS at 12:07

## 2021-01-01 RX ADMIN — MORPHINE SULFATE 1 MG: 2 INJECTION, SOLUTION INTRAMUSCULAR; INTRAVENOUS at 01:51

## 2021-01-01 RX ADMIN — CEFEPIME HYDROCHLORIDE 1 G: 1 INJECTION, POWDER, FOR SOLUTION INTRAMUSCULAR; INTRAVENOUS at 13:31

## 2021-01-01 RX ADMIN — SODIUM CHLORIDE: 450 INJECTION, SOLUTION INTRAVENOUS at 13:31

## 2021-01-01 RX ADMIN — NOREPINEPHRINE BITARTRATE 30 MCG/MIN: 1 SOLUTION INTRAVENOUS at 17:55

## 2021-01-01 RX ADMIN — Medication 100 MEQ: at 21:26

## 2021-01-01 RX ADMIN — ACETYLCYSTEINE 200 MG: 200 SOLUTION ORAL; RESPIRATORY (INHALATION) at 19:31

## 2021-01-01 RX ADMIN — DOPAMINE HYDROCHLORIDE 10 MCG/KG/MIN: 320 INJECTION, SOLUTION INTRAVENOUS at 10:02

## 2021-01-01 RX ADMIN — ONDANSETRON 4 MG: 2 INJECTION INTRAMUSCULAR; INTRAVENOUS at 10:33

## 2021-01-01 RX ADMIN — CEFEPIME HYDROCHLORIDE 1 G: 1 INJECTION, POWDER, FOR SOLUTION INTRAMUSCULAR; INTRAVENOUS at 12:07

## 2021-01-01 RX ADMIN — SODIUM BICARBONATE 50 MEQ: 84 INJECTION, SOLUTION INTRAVENOUS at 11:19

## 2021-01-01 RX ADMIN — SODIUM BICARBONATE 100 MEQ: 84 INJECTION, SOLUTION INTRAVENOUS at 21:26

## 2021-01-01 RX ADMIN — SODIUM CHLORIDE 1000 ML: 9 INJECTION, SOLUTION INTRAVENOUS at 10:34

## 2021-01-01 RX ADMIN — ALBUMIN (HUMAN) 25 G: 12.5 INJECTION, SOLUTION INTRAVENOUS at 09:58

## 2021-01-01 RX ADMIN — FAMOTIDINE 20 MG: 10 INJECTION, SOLUTION INTRAVENOUS at 12:07

## 2021-01-01 RX ADMIN — SODIUM CHLORIDE 3 ML/HR: 9 INJECTION, SOLUTION INTRAVENOUS at 19:50

## 2021-01-01 RX ADMIN — ASPIRIN 75 MG: 300 SUPPOSITORY RECTAL at 15:31

## 2021-01-01 RX ADMIN — THIAMINE HYDROCHLORIDE 100 MG: 100 INJECTION, SOLUTION INTRAMUSCULAR; INTRAVENOUS at 12:07

## 2021-01-01 RX ADMIN — Medication 100 MEQ: at 09:48

## 2021-01-01 RX ADMIN — SODIUM CHLORIDE, POTASSIUM CHLORIDE, SODIUM LACTATE AND CALCIUM CHLORIDE 500 ML/HR: 600; 310; 30; 20 INJECTION, SOLUTION INTRAVENOUS at 20:31

## 2021-01-01 RX ADMIN — CALCIUM GLUCONATE 3 G: 94 INJECTION, SOLUTION INTRAVENOUS at 21:48

## 2021-01-01 RX ADMIN — ALBUMIN (HUMAN) 25 G: 12.5 INJECTION, SOLUTION INTRAVENOUS at 20:29

## 2021-01-01 RX ADMIN — Medication 5 ML: at 22:00

## 2021-01-01 RX ADMIN — VASOPRESSIN 0.03 UNITS/MIN: 20 INJECTION INTRAVENOUS at 10:39

## 2021-01-01 RX ADMIN — THIAMINE HYDROCHLORIDE 100 MG: 100 INJECTION, SOLUTION INTRAMUSCULAR; INTRAVENOUS at 20:38

## 2021-01-01 RX ADMIN — SODIUM CHLORIDE 2000 ML: 9 INJECTION, SOLUTION INTRAVENOUS at 12:28

## 2021-01-01 RX ADMIN — CHLORHEXIDINE GLUCONATE 15 ML: 0.12 RINSE ORAL at 20:08

## 2021-01-01 RX ADMIN — IPRATROPIUM BROMIDE AND ALBUTEROL SULFATE 3 ML: .5; 3 SOLUTION RESPIRATORY (INHALATION) at 19:31

## 2021-01-01 RX ADMIN — SODIUM CHLORIDE 40 MG: 9 INJECTION INTRAMUSCULAR; INTRAVENOUS; SUBCUTANEOUS at 20:12

## 2021-01-01 RX ADMIN — SODIUM BICARBONATE 100 MEQ: 84 INJECTION, SOLUTION INTRAVENOUS at 12:04

## 2021-01-01 RX ADMIN — CALCIUM GLUCONATE 2 G: 20 INJECTION, SOLUTION INTRAVENOUS at 12:08

## 2021-10-05 PROBLEM — K85.90 PANCREATITIS: Status: ACTIVE | Noted: 2021-01-01

## 2021-10-05 NOTE — ED NOTES
Bedside and Verbal shift change report given to YONAS Kumar (oncoming nurse) by Tyler West RN (offgoing nurse). Report included the following information SBAR, ED Summary, MAR and Recent Results.

## 2021-10-05 NOTE — ED TRIAGE NOTES
Patient son reports patient not eating for 5 days. Pain to abdomen. Son says she was seen at [facility] on Friday night and discharged. Still not eating.

## 2021-10-05 NOTE — ED NOTES
Called Dr. Olive Rojas and notified him of patient's POC lactic of 3.26, Ionized calcium of 1.31 and Troponin of 0.14. Received verbal orders with readback for 500 mL bolus of 0.45% normal saline at this time. Nurse to place order in chart and will recheck lactic in 2 hours.

## 2021-10-05 NOTE — PROGRESS NOTES
Day #1 of cefepime  Indication:  sepsis  Current regimen:  2 gram Q8H  Abx regimen: cefepime  Recent Labs     10/05/21  1212 10/05/21  1029 10/05/21  0912   WBC 6.2 8.5  --    CREA  --   --  2.37*   BUN  --   --  58*     Est CrCl: 8.5 ml/min; UO: --- ml/kg/hr  Temp (24hrs), Av.5 °F (36.9 °C), Min:98.2 °F (36.8 °C), Max:98.7 °F (37.1 °C)    Cultures:   10/5 blood, pending    Plan: Change to cefepime 1 gram Q24H for sepsis and crcl > 11 ml/min

## 2021-10-05 NOTE — ED PROVIDER NOTES
78-year-old female with a history of COPD, dementia, HTN, presents to the emergency department with son noting upper abdominal pain and decreased p.o. intake over the last 5 to 6 days. She was reportedly seen at OSH on the onset of her symptoms had unremarkable work-up at that time. She states that symptoms have been significantly worsening since then and she has had basically no p.o. intake since the onset of her symptoms. No noted fevers. She has had increased confusion from her neurologic baseline and her son states that she is normally much more alert and talkative than she is currently. No history of prior abdominal surgeries. History is provided by patient's son and is limited from the patient herself due to mental status change and confusion. Past Medical History:   Diagnosis Date    COPD (chronic obstructive pulmonary disease) ([facility] [address] 3/31/2020    Dementia ([facility] [address]     Hypertension     Other ill-defined conditions(799.89)     2 MI's last one in 2007       No past surgical history on file. No family history on file. Social History     Socioeconomic History    Marital status:      Spouse name: Not on file    Number of children: Not on file    Years of education: Not on file    Highest education level: Not on file   Occupational History    Not on file   Tobacco Use    Smoking status: Current Every Day Smoker    Smokeless tobacco: Never Used   Substance and Sexual Activity    Alcohol use: Yes    Drug use: Not on file    Sexual activity: Not on file   Other Topics Concern    Not on file   Social History Narrative    Not on file     Social Determinants of Health     Financial Resource Strain:     Difficulty of Paying Living Expenses:    Food Insecurity:     Worried About Running Out of Food in the Last Year:     [address] in the Last Year:    Transportation Needs:     Lack of Transportation (Medical):      Lack of Transportation (Non-Medical):    Physical Activity:     Days of Exercise per Week:     Minutes of Exercise per Session:    Stress:     Feeling of Stress :    Social Connections:     Frequency of Communication with Friends and Family:     Frequency of Social Gatherings with Friends and Family:     Attends [address] Services:     Active Member of Clubs or Organizations:     Attends Club or Organization Meetings:     Marital Status:    Intimate Partner Violence:     Fear of Current or Ex-Partner:     Emotionally Abused:     Physically Abused:     Sexually Abused: ALLERGIES: Patient has no known allergies. Review of Systems   Unable to perform ROS: Dementia       Vitals:    10/05/21 0847   BP: (!) 103/58   Pulse: 76   Resp: 18   Temp: 98.2 °F (36.8 °C)   SpO2: 98%            Physical Exam  Vitals and nursing note reviewed. Constitutional:       General: She is not in acute distress. Appearance: Normal appearance. She is well-developed. She is ill-appearing. She is not diaphoretic. HENT:      Head: Normocephalic and atraumatic. Nose: Nose normal.   Eyes:      Extraocular Movements: Extraocular movements intact. Conjunctiva/sclera: Conjunctivae normal.      Pupils: Pupils are equal, round, and reactive to light. Cardiovascular:      Rate and Rhythm: Normal rate and regular rhythm. Heart sounds: Normal heart sounds. Pulmonary:      Effort: Pulmonary effort is normal.      Breath sounds: Normal breath sounds. Abdominal:      General: There is no distension. Palpations: Abdomen is soft. Tenderness: There is abdominal tenderness in the right upper quadrant, epigastric area, periumbilical area and left upper quadrant. There is no right CVA tenderness, left CVA tenderness, guarding or rebound. Musculoskeletal:         General: No tenderness. Cervical back: Neck supple. Skin:     General: Skin is warm and dry. Neurological:      General: No focal deficit present. Mental Status: She is alert. She is disoriented and confused. GCS: GCS eye subscore is 4. GCS verbal subscore is 4. GCS motor subscore is 6. Cranial Nerves: No cranial nerve deficit. Sensory: No sensory deficit. Motor: No weakness. Coordination: Coordination normal.          MDM   80year-old female presents with upper abdominal pain, decreased p.o. intake and metabolic encephalopathy. She is afebrile with vital signs stable     Labs returned showing lactic acid 2.8, bicarb 13, anion gap 13, CK with creatinine elevated at 2.37, BUN 58, NA elevated 123, . Elevated lipase at 1318, concerning for pancreatitis. Troponin trace elevated at 0.13, but similar to prior measures. No leukocytosis or anemia. CXR viewed by myself and read by radiology showing no acute abnormalities. CT head shows no acute intracranial abnormalities  CT abdomen pelvis shows no acute abnormalities. He was given IV fluid bolus and IV pain medication as well as IV bicarb and IV cefepime given concern for possible developing infected pancreatitis given lactic acidosis and extent of metabolic acidosis. We'll admit to the hospitalist service for further care and assessment. Total critical care time spent exclusive of procedures:  50 minutes    Procedures    855 EKG shows normal sinus rhythm with a rate of 97 bpm with PACs and right atrial enlargement with no acute ST or T wave abnormalities suggestive of ischemia. Perfect Serve Consult for Admission  11:08 AM    ED Room Number: WZ78/65  Patient Name and age:  Marcia Milner 80 y.o.  female  Working Diagnosis:   1. Acute pancreatitis, unspecified complication status, unspecified pancreatitis type    2. CK (acute kidney injury) ([address]    3. Metabolic acidosis    4. Dehydration    5. Abdominal pain, epigastric    6. Metabolic encephalopathy    7. Hypernatremia    8. Lactic acidosis    9.  Nausea and vomiting in adult        COVID-19 Suspicion:  no  Sepsis present:  yes Reassessment needed: no  Code Status:  Full Code  Readmission: no  Isolation Requirements:  no  Recommended Level of Care:  telemetry  Department:[facility] Adult ED - 21   Other: 59-year-old female with about a 5 to 6-day history of upper abdominal pain, nausea, vomiting and decreased p.o. intake. Appears to have pancreatitis, and CK with metabolic acidosis. CT abdomen pelvis negative. Getting IV pain medication, Zofran, IV fluids are current. Also ordered cefepime given lactic acidosis but likely associated with dehydration.        Sepsis Re-Assessment Documentation:     Date: 10/5/2021   Time: 11:09 AM      Vital Signs  Level of Consciousness: Alert (0)  Temp: 98.7 °F (37.1 °C)  Temp Source: Oral  Pulse (Heart Rate): 77  Heart Rate Source: Monitor  Resp Rate: 24  BP: (!) 161/75  MAP (Monitor): 97  MAP (Calculated): 104  BP 1 Location: Left arm  BP 1 Method: Automatic  BP Patient Position: At rest  MEWS Score: 2

## 2021-10-05 NOTE — ED NOTES
Attempted to get patient to urinate using the bedpan, but was unsuccessful. Attempted to straight cath patient at this time with Nic Price, unsuccessful attempt. Patient placed on purewick on suction. Will attempt to get urine sample at a later time.

## 2021-10-05 NOTE — SENIOR SERVICES NOTE
SSED Visit, TRST 4. Chart Reviewed: HX COPD, Dementia, HTN, right sided nontraumatic ICH, SAH, SDH. Here today for decreased appetite for 5 days. Patient and son greeted in room, patient resting in eyes closed. I introduced myself to sonEllie Fairly and role as SSED NP. Guzman manages patient medications and care for his mother. All questioned answered by [facility], patients primary caregiver. Home:   -Patient lives with her son, [facility] and Guzman's daughter.  -3 story home, patient resides on the 2nd floor, walk in shower with shower chair.   -5 steps to get into the home initially and then walking up to the second flight up stairs. Medication Reconciliation performed with patients son, [facility] at the bedside, allergy and pharmacy verified. 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 Denies recent fall or injury. Decreased appetite over the last 5 days, high risk for malnutrition/ frailty. ADL/IADLs: Able to put spoon to mouth to feed herself, otherwise dependent on family for all needs, including driving to MD appts. , and picking up prescribed medications. DME:  [employer], rollator, cane, shower chair. ACP planning: Guzman and I discussed patients Advanced Directive, states that she does not have an Advanced Directive, I encouraged Guzman to obtain/ provide documentation showing that he is the patient POA. We discussed briefly Code Status, noted order placed on chart by Dr. Valentina Andrea. Guzman expressing that he \"wants everything done, to include chest compressions, shock, medications, intubation, ventilator support, central line, and artificial feeding\" if required. Biggest need in home is for additional caregiver support and / or to be able to pay his daughter for helping him care for the patient. Needs:   -CM assist in following up with Guzman to help him apply for [employer] for patient.  [facility] has a meeting today in his home with a community  to assist with starting [employer] application. Anitra Lamb is seeking assistance with paying for caregivers in the home due to him still working as a  from [phone] 5 days/ week. -Assist Guzman with an updated medication list. Anitra Lamb has multiple lists with him, that he has questioned some medications. -PT/OT to maintain mobility, patient has been managing with walker.   -Nutrition consult for supplement recommendations. Patient is admitted and will be followed by inpatient team. Findings have been collaborated with Dr. Etta Arthur and Jaon Alcantara CM to assist with planning efforts. Thank you for the opportunity to assist with caring for this patient.   Tiana Gomze [address], NP  SSED  12:36 PM  [phone]

## 2021-10-05 NOTE — HOME CARE
History & Physical    Primary Care Provider: Isidro Lackey MD  Source of Information: Patient and his son Morena Baldwin    History of Presenting Illness:   Micky Brittle is a 80 y.o. female who presents with failure to thrive    History was probably obtained from patient's son Morena Baldwin    He reports that patient has not been eating drinking for the past 5 days, he reports that patient is more confused than she usually does, has been much weaker, has not been able to get out of the bed, patient was seen at [facility] on Friday night and was discharged from the ER, he got concerned and decided to bring the patient to the hospital.  He also reports that patient is complaining of abdominal pain and nausea and has had couple of bouts of vomiting associated with her symptoms. Currently the patient is resting in bed, opens eyes to verbal commands but does not answer many questions, complaints of abdominal pain and points to her epigastric region. No further history could be obtained from patient or patient's son  . Review of Systems:  Review of systems not obtained due to patient factors. Altered mental status  All other systems reviewed, pertinent positives and negatives noted in HPI    Past Medical History:   Diagnosis Date    COPD (chronic obstructive pulmonary disease) ([facility] [address] [facility] 3/31/2020    Dementia ([facility] [address] [facility]     Hypertension     Other ill-defined conditions(799.89)     2 MI's last one in 2007      No past surgical history on file. Prior to Admission medications    Medication Sig Start Date End Date Taking? Authorizing Provider   atorvastatin (LIPITOR) 40 mg tablet Take 40 mg by mouth nightly. 9/24/21  Yes Other, MD Dwayne   isosorbide mononitrate ER (IMDUR) 30 mg tablet Take 30 mg by mouth daily. in the morning 8/24/21  Yes Other, MD Dwayne   levothyroxine (SYNTHROID) 50 mcg tablet Take 88 mcg by mouth Daily (before breakfast).  9/7/21  Yes Other, MD Dwayne metoprolol tartrate (LOPRESSOR) 25 mg tablet TAKE 1 TABLET BY MOUTH TWICE DAILY WITH FOOD 9/24/21  Yes Other, MD Dwayne   telmisartan (MICARDIS) 80 mg tablet Take 80 mg by mouth daily. 8/23/21  Yes Other, MD Dwayne   amLODIPine (NORVASC) 2.5 mg tablet TAKE 3 TABLETS BY MOUTH EVERY DAY 8/26/21  Yes Other, MD Dwayne   aspirin 81 mg chewable tablet Take 81 mg by mouth daily. Yes Other, MD Dwayne   nitroglycerin (NITROSTAT) 0.3 mg SL tablet 0.3 mg by SubLINGual route every five (5) minutes as needed for Chest Pain. Yes Other, MD Dwayne   levalbuterol (XOPENEX) 0.63 mg/3 mL nebu USE 1 VIAL VIA NEBULIZER EVERY 8 HOURS AS NEEDED 7/9/21   Other, MD Dwayne   Vios machine See Admin Instructions. 7/8/21   Other, MD Dwayne   albuterol (PROVENTIL HFA, VENTOLIN HFA, PROAIR HFA) 90 mcg/actuation inhaler Take 2 Puffs by inhalation every four (4) hours as needed for Wheezing. 4/9/20   Keyla Delaney MD   cloNIDine (CATAPRES) 0.1 mg/24 hr ptwk 1 Patch by TransDERmal route every seven (7) days. 4/9/20   Dominguez Oliva MD   polyethylene glycol (MIRALAX) 17 gram packet Take 1 Packet by mouth two (2) times daily as needed for Constipation. 4/3/20   Dominguez Oliva MD     Allergies   Allergen Reactions    Iodinated Contrast Media Other (comments)      No family history on file. Family history was discussed with the patient, all pertinent and relevant details are mentioned as above, no other pertinent and relevant family history was noted on my discussion with the patient.   Patient specifically denies any history of Gaucher disease in the family  SOCIAL HISTORY:  Patient resides:  Independently x   Assisted Living    SNF    With family care       Smoking history:   None    Former x   Chronic      Alcohol history:   None x   Social    Chronic      Ambulates:   Independently x   w/cane    w/walker    w/wc    CODE STATUS:  DNR    Full x   Other      Objective:     Physical Exam:     Visit Vitals  BP (!) 143/73   Pulse 92   Temp 98.7 °F (37.1 °C)   Resp 21   Ht 5' (1.524 m)   Wt 38.9 kg (85 lb 12.1 oz)   SpO2 95%   BMI 16.75 kg/m²      O2 Device: None (Room air)    General : alert x 1, weak and confused  HEENT: PEERL, moist mucus membrane, TM clear  Neck: supple,   Chest: Decreased basal breath sounds  CVS: S1 S2 heard,   Abd: soft/tender to palpation in the epigastric region/no rebound/no guarding  Ext: no clubbing, no cyanosis, no edema,   Neuro/Psych: pleasant mood and affect, CN 2-12 grossly intact,   Skin: warm     EKG: Sinus rhythm with nonspecific ST changes    Data Review:     Recent Days:  Recent Labs     10/05/21  1212 10/05/21  1029   WBC 6.2 8.5   HGB 11.9 12.8   HCT 38.6 40.7    156     Recent Labs     10/05/21  1212 10/05/21  0912   NA  --  153*   K  --  4.2   CL  --  127*   CO2  --  13*   GLU  --  124*   BUN  --  58*   CREA  --  2.37*   CA  --  10.5*   MG 2.0 2.2   PHOS 4.7  --    ALB  --  2.8*   ALT  --  45     No results for input(s): PH, PCO2, PO2, HCO3, FIO2 in the last 72 hours.     24 Hour Results:  Recent Results (from the past 24 hour(s))   EKG, 12 LEAD, INITIAL    Collection Time: 10/05/21  8:55 AM   Result Value Ref Range    Ventricular Rate 97 BPM    Atrial Rate 97 BPM    P-R Interval 122 ms    QRS Duration 84 ms    Q-T Interval 356 ms    QTC Calculation (Bezet) 452 ms    Calculated P Axis 83 degrees    Calculated R Axis 87 degrees    Calculated T Axis 74 degrees    Diagnosis       Sinus rhythm with premature atrial complexes with aberrant conduction  Right atrial enlargement  Borderline ECG  When compared with ECG of 29-MAR-2020 20:23,  aberrant conduction is now present  Questionable change in QRS axis     METABOLIC PANEL, COMPREHENSIVE    Collection Time: 10/05/21  9:12 AM   Result Value Ref Range    Sodium 153 (H) 136 - 145 mmol/L    Potassium 4.2 3.5 - 5.1 mmol/L    Chloride 127 (H) 97 - 108 mmol/L    CO2 13 (LL) 21 - 32 mmol/L    Anion gap 13 5 - 15 mmol/L    Glucose 124 (H) 65 - 100 mg/dL    BUN 58 (H) 6 - 20 MG/DL Creatinine 2.37 (H) 0.55 - 1.02 MG/DL    BUN/Creatinine ratio 24 (H) 12 - 20      GFR est AA 23 (L) >60 ml/min/1.73m2    GFR est non-AA 19 (L) >60 ml/min/1.73m2    Calcium 10.5 (H) 8.5 - 10.1 MG/DL    Bilirubin, total 1.0 0.2 - 1.0 MG/DL    ALT (SGPT) 45 12 - 78 U/L    AST (SGOT) 44 (H) 15 - 37 U/L    Alk. phosphatase 143 (H) 45 - 117 U/L    Protein, total 7.4 6.4 - 8.2 g/dL    Albumin 2.8 (L) 3.5 - 5.0 g/dL    Globulin 4.6 (H) 2.0 - 4.0 g/dL    A-G Ratio 0.6 (L) 1.1 - 2.2     LIPASE    Collection Time: 10/05/21  9:12 AM   Result Value Ref Range    Lipase 1,318 (H) 73 - 393 U/L   TROPONIN I    Collection Time: 10/05/21  9:12 AM   Result Value Ref Range    Troponin-I, Qt. 0.13 (H) <0.05 ng/mL   MAGNESIUM    Collection Time: 10/05/21  9:12 AM   Result Value Ref Range    Magnesium 2.2 1.6 - 2.4 mg/dL   SAMPLES BEING HELD    Collection Time: 10/05/21 10:29 AM   Result Value Ref Range    SAMPLES BEING HELD 1BLU     COMMENT        Add-on orders for these samples will be processed based on acceptable specimen integrity and analyte stability, which may vary by analyte. LACTIC ACID    Collection Time: 10/05/21 10:29 AM   Result Value Ref Range    Lactic acid 2.8 (HH) 0.4 - 2.0 MMOL/L   AMMONIA    Collection Time: 10/05/21 10:29 AM   Result Value Ref Range    Ammonia 33 (H) <32 UMOL/L   CBC WITH AUTOMATED DIFF    Collection Time: 10/05/21 10:29 AM   Result Value Ref Range    WBC 8.5 3.6 - 11.0 K/uL    RBC 6.03 (H) 3.80 - 5.20 M/uL    HGB 12.8 11.5 - 16.0 g/dL    HCT 40.7 35.0 - 47.0 %    MCV 67.5 (L) 80.0 - 99.0 FL    MCH 21.2 (L) 26.0 - 34.0 PG    MCHC 31.4 30.0 - 36.5 g/dL    RDW 20.1 (H) 11.5 - 14.5 %    PLATELET 723 295 - 374 K/uL    NRBC 0.0 0  WBC    ABSOLUTE NRBC 0.00 0.00 - 0.01 K/uL    NEUTROPHILS 52 32 - 75 %    LYMPHOCYTES 35 12 - 49 %    MONOCYTES 5 5 - 13 %    EOSINOPHILS 7 0 - 7 %    BASOPHILS 1 0 - 1 %    IMMATURE GRANULOCYTES 0 0.0 - 0.5 %    ABS. NEUTROPHILS 4.4 1.8 - 8.0 K/UL    ABS.  LYMPHOCYTES 3.0 0.8 - 3.5 K/UL    ABS. MONOCYTES 0.4 0.0 - 1.0 K/UL    ABS. EOSINOPHILS 0.6 (H) 0.0 - 0.4 K/UL    ABS. BASOPHILS 0.1 0.0 - 0.1 K/UL    ABS. IMM. GRANS. 0.0 0.00 - 0.04 K/UL    DF SMEAR SCANNED      RBC COMMENTS MICROCYTOSIS  2+        RBC COMMENTS SCHISTOCYTES  PRESENT        RBC COMMENTS ANISOCYTOSIS  2+        RBC COMMENTS POLI CELLS  PRESENT        RBC COMMENTS OVALOCYTES  PRESENT       CBC WITH AUTOMATED DIFF    Collection Time: 10/05/21 12:12 PM   Result Value Ref Range    WBC 6.2 3.6 - 11.0 K/uL    RBC 5.75 (H) 3.80 - 5.20 M/uL    HGB 11.9 11.5 - 16.0 g/dL    HCT 38.6 35.0 - 47.0 %    MCV 67.1 (L) 80.0 - 99.0 FL    MCH 20.7 (L) 26.0 - 34.0 PG    MCHC 30.8 30.0 - 36.5 g/dL    RDW 20.2 (H) 11.5 - 14.5 %    PLATELET 659 809 - 535 K/uL    NRBC 0.0 0  WBC    ABSOLUTE NRBC 0.00 0.00 - 0.01 K/uL    NEUTROPHILS 61 32 - 75 %    LYMPHOCYTES 31 12 - 49 %    MONOCYTES 5 5 - 13 %    EOSINOPHILS 2 0 - 7 %    BASOPHILS 1 0 - 1 %    IMMATURE GRANULOCYTES 0 0.0 - 0.5 %    ABS. NEUTROPHILS 3.8 1.8 - 8.0 K/UL    ABS. LYMPHOCYTES 1.9 0.8 - 3.5 K/UL    ABS. MONOCYTES 0.3 0.0 - 1.0 K/UL    ABS. EOSINOPHILS 0.1 0.0 - 0.4 K/UL    ABS. BASOPHILS 0.1 0.0 - 0.1 K/UL    ABS. IMM.  GRANS. 0.0 0.00 - 0.04 K/UL    DF SMEAR SCANNED      RBC COMMENTS POLI CELLS  PRESENT        RBC COMMENTS OVALOCYTES  PRESENT        RBC COMMENTS SCHISTOCYTES  PRESENT        RBC COMMENTS MICROCYTOSIS  2+        RBC COMMENTS HYPOCHROMIA  2+        RBC COMMENTS ANISOCYTOSIS  2+       SARS-COV-2    Collection Time: 10/05/21 12:12 PM   Result Value Ref Range    SARS-CoV-2 Please find results under separate order     LIPID PANEL    Collection Time: 10/05/21 12:12 PM   Result Value Ref Range    Cholesterol, total 73 <200 MG/DL    Triglyceride 74 <150 MG/DL    HDL Cholesterol 40 MG/DL    LDL, calculated 18.2 0 - 100 MG/DL    VLDL, calculated 14.8 MG/DL    CHOL/HDL Ratio 1.8 0.0 - 5.0     MAGNESIUM    Collection Time: 10/05/21 12:12 PM   Result Value Ref Range    Magnesium 2.0 1.6 - 2.4 mg/dL   PHOSPHORUS    Collection Time: 10/05/21 12:12 PM   Result Value Ref Range    Phosphorus 4.7 2.6 - 4.7 MG/DL   LACTIC ACID    Collection Time: 10/05/21 12:17 PM   Result Value Ref Range    Lactic acid 2.8 (HH) 0.4 - 2.0 MMOL/L   BLOOD GAS,LACTIC ACID, POC    Collection Time: 10/05/21 12:36 PM   Result Value Ref Range    pH (POC) 7.34 (L) 7.35 - 7.45      pCO2 (POC) 41.0 35.0 - 45.0 MMHG    pO2 (POC) 37 (LL) 80 - 100 MMHG    HCO3 (POC) 21.9 (L) 22 - 26 MMOL/L    sO2 (POC) 67.2 (L) 92 - 97 %    Base deficit (POC) 3.7 mmol/L    Specimen type (POC) VENOUS BLOOD      Performed by [facility]  ED tech     Lactic Acid (POC) 2.08 (HH) 0.40 - 2.00 mmol/L    Critical value read back ROYA          Imaging:   CT HEAD WO CONT    Result Date: 10/5/2021  No acute process or change compared to the prior exam.     CT ABD PELV WO CONT    Result Date: 10/5/2021  No bowel obstruction, ileus or perforation. No intra-abdominal abscess. XR CHEST PORT    Result Date: 10/5/2021  No acute process.      Assessment/Plan     Acute pancreatitis  Hypernatremia  Metabolic acidosis  Acute kidney injury  Hypercalcemia  Failure to thrive  Hypertensive urgency  Hypothyroidism    Patient will be admitted to telemetry bed  N.p.o., IV hydration, pain control, GI consult, supportive care, repeat lipase level in the morning, further intervention per hospital course, reassess as needed  Likely hypovolemic volume contraction hypernatremia, half-normal saline, replenish fluid status, monitor neurovascular checks, supportive care close monitoring and reassess as needed  Likely secondary to CK and volume contraction, bicarb GTT, repeat labs in the morning, continue to monitor  Likely prerenal, gentle IV hydration, avoid nephrotoxic medication, renally dose other medications, trend creatinine, if continues to be elevated may consider further intervention and diagnostics  PTH and ionized calcium level, monitor, repeat calcium levels in the morning  Failure to thrive secondary debility-age, IV hydration, supportive care, rule out any underlying infection, continue monitor and reassess as needed, patient with no obvious source of infection, empirical antibiotics, discontinue if no source found, monitor  Hydralazine as needed  Restart synthroid in am    GI/DVT: Patient will be on heparin           Please note that this dictation was completed with "[employer] the computer voice recognition software. Quite often unanticipated grammatical, syntax, homophones, and other interpretive errors are inadvertently transcribed by the computer software. Please disregard these errors. Please excuse any errors that have escaped final proofreading.          Signed By: Jack Santos MD     October 5, 2021

## 2021-10-05 NOTE — ED NOTES
Labs performed on [facility] results are below: Currently results not crossing over to chart. Ionized calcium: 1.31    POC lactic: 3.26    Attempted to call Dr. Tank Liz to notify of results, but no answer. Paged provider at this time. Awaiting call back.

## 2021-10-05 NOTE — PROGRESS NOTES
Transition of Care Plan:  Core Measure     RUR:  14% GLOS:   TBD LOS: 0     Disposition: Home with family assistance  Follow up appointments: PCP  DME needed:  TBD  Transportation at Discharge: Family will transport on discharge vs stretcher  IM Medicare letter:     Caregiver Contact:   Justyn Otero [phone]    Reason for Admission:    Abdominal pain                   RUR Score:          14% Low Risk           Plan for utilizing home health:      Open to [address]    PCP: First and Last name:  James Velazquez MD     Name of Practice:    [address] [place] [address]   Are you a current patient: Yes/No:  Yes   Approximate date of last visit:   4/20   Can you participate in a virtual visit with your PCP:  Yes with sons assistance                    Current Advanced Directive/Advance Care Plan: Full Code      Healthcare Decision Maker:   Click here to complete [address] including selection of the Healthcare Decision Maker Relationship (ie \"Primary\")             Primary Decision Maker (Active): Tong Lucero - [phone]                    Transition of Care Plan:                      Avery Weathers is a 80year old female to [facility], he is requesting assistance with Medicaid. He has been working with home health agency, has meeting with [address] [place] [address] through [facility]. Open to [facility]. Son is  and filled out paper application for Medicaid, initial application was denied. He again went to [facility] and had them review the application. Asked about the letter that [place] sent, per son she does not own home. She has receives pension from [facility] and [place]. Tania Queen is spending approximately $250 week for caregiver in the home, averaging $1000 monthly of his OWN money. Explained that first he should exhaust her monies FIRST prior to spending money. CM can assist with [employer] screening - Medicaid Pending for personal Care.       CM will continue to follow and assist for transition of care needs, plan to home when medically stable. Family very involved in care of Ms. Junior Larson.       Son Gwendolyn Sandoval Julieth 61, RN, BSN, [facility]  ED Care Management  [phone]

## 2021-10-06 NOTE — PROGRESS NOTES
CODE BLUE    Responded to code blue at about 8 AM. Pt in PEA. ACLS activated. ROSC achieved at about 8:21 AM. Moving pt to ICU for post arrest care.

## 2021-10-06 NOTE — WOUND CARE
WOBRITTANY Note:       New consult placed by RN / Jordan Chen / sacral breakdown    Assessment: Rm: [phone] : awaiting bed in CCU. Cardiac arrest this a.m. Intubated  Central Line  /  HD    Transition of Care: will follow up on breakdown to sacrum / POA / and assess tomorrow.     Trevor BOSS RN  Wound Care Department  Office: [phone]  Pager: [phone]

## 2021-10-06 NOTE — PROGRESS NOTES
0424:  TRANSFER - IN REPORT:    Verbal report received from Katty Aviles RN(name) on [facility] Room  being received from ED(unit) for routine progression of care      Report consisted of patients Situation, Background, Assessment and   Recommendations(SBAR). Information from the following report(s) SBAR, Kardex, ED Summary, [facility] and Recent Results was reviewed with the receiving nurse. Opportunity for questions and clarification was provided. Assessment completed upon patients arrival to unit and care assumed. 0424:    Primary Nurse YONAS Paul and Asuncion Núñez RN performed a dual skin assessment on this patient Impairment noted- see wound doc flow sheet  Isidro score is 10      Pt rectal temperature 95.4; put on Greg hugger. Will continue to monitor. 0600: Both IV's infiltrated. RNs tried to obtain IV access, Unsuccessful. NP notified about critical lab of Lactic acid and no IV access. Rn requested getting a central line. 9772: vitals taken; patient awake and restless. 0745: Verbal bedside report given to [place] and stormy, RN oncoming nurse by Archie Morales and YONSA herrera off-going nurse. Report included current pt status and condition, recent results, hx of present illness, heart rate and rhythm, and respiratory status.         0800: pt unresponsive, code blue called and CPR started

## 2021-10-06 NOTE — ED NOTES
Pt temperature was 92.0 rectally. Hospitalist notified. Hospitalist instructed RN to put patient on morenita hugger. Pt was placed on morenita hugger.

## 2021-10-06 NOTE — PROGRESS NOTES
1030 TRANSFER - IN REPORT:    Verbal report received from [place] and stormy (name) on Yuriy Darling  being received from [facility]) for routine progression of care      Report consisted of patients Situation, Background, Assessment and   Recommendations(SBAR). Information from the following report(s) SBAR, Kardex, Procedure Summary, Intake/Output, MAR, Accordion and Recent Results was reviewed with the receiving nurse. Opportunity for questions and clarification was provided. Assessment completed upon patients arrival to unit and care assumed. 1100 Pt arrived to unit, Dr. Bhatt @ bedside for insertion of A-line. Femostop removed from R groin. Groin site bleeding femostop reapplied. 1126 Critical INR 8 Dr. Bhatt notified, orders for 3 FFP. 1256 HGB 3.7 Dr. Bhatt notified, orders for 4 units PRBCs. 1500 FFP finished new INR sent    [address] called, Pt has new L apical Pneumothorax. Dr. Bhatt notified. 1700 Lab called, unable to run labs. New labs sent    [phone] Dr. Bhatt @ bedside placing chest tube. Bedside and Verbal shift change report given to Nataly Valerio (oncoming nurse) by Yovani Landin (offgoing nurse). Report included the following information SBAR, Kardex, ED Summary, Procedure Summary, Intake/Output, MAR, Accordion and Recent Results.

## 2021-10-06 NOTE — ROUTINE PROCESS
0757:Arrived in room to introduce to pt. Noticed pt's BP was 94/43, Pt not responding to name. Code blue called compressions started, code team arrived to pt's room. 0053: IO placed for access , Blood sugar checked (22)  0805: Pulse check, no pulse, compressions resumed  0807: Pulse check, no pulse.  Meds pushed, Bicarb, dextrose, and Epi 0.3mg  0807: compressions resumed   0809: Pulse check no pulse resumed compressions   0810:  Epinephrine  0.3mg given   Pt intubated   0811: Pulse check, no pulse, compressions resumed  0813:  Pulse check, no pulse, compressions resumed  0813: Epinephrine given 0.3mg  0815:Pulse check, no pulse, compressions resumed  0816:Pt shocked   0816: Compressions resumed   0817: 2g of magnesium given  0818: Pulse check,Pt shocked , compressions resumed, 300 Amiodorone given   0819: Epinephrine 0.3mg  0820: Pulse check  /78  0820: 1L NS started   0821: ROSC achieived   5171: 0.5 of Dopamine   0824:Paced at 60  0850: Dopamine 10  Quad lumen placed by    8183: Sodium Bicarbonate 8.4%  push   0855:Sodium Bicarb  drip started   0910: Femo Stop placed 112mm/hg  Pt's son arrived at bedside and spoke with provider  Pt monitored by CCU nurse & RT  Pt transferred to CCU

## 2021-10-06 NOTE — PROGRESS NOTES
responded to code blue. Pt's son was contacted. Talked to staff. Please contact [phone] [facility] for further support.      [address] Gina Roy, [facility]   [phone]

## 2021-10-06 NOTE — PROCEDURES
Indication - Pneumothorax  Diagnosis - Cardiac arrest     The patient was placed in a position appropriate for chest tube placement. The patients right \"safety triangle\" was prepped and draped in sterile fashion. 1% Lidocaine was used to anesthetize the surrounding skin area. A pigtail catheter was introduced into the the R pleural space using the Seldinger technique. The catheter was threaded smoothly over the guide wire and connected to an atrium. The catheter was then sutured in place to the skin and a sterile dressing applied.

## 2021-10-06 NOTE — ROUTINE PROCESS
TRANSFER - OUT REPORT:    Verbal report given to YONAS Paul(name) on Giovanni Citizen  being transferred to [facility]) for routine progression of care       Report consisted of patients Situation, Background, Assessment and   Recommendations(SBAR). Information from the following report(s) SBAR, Kardex and ED Summary was reviewed with the receiving nurse. Lines:   Peripheral IV 10/05/21 Right Antecubital (Active)   Site Assessment Clean, dry, & intact 10/05/21 1230   Phlebitis Assessment 0 10/05/21 1230   Infiltration Assessment 0 10/05/21 1230   Dressing Status Clean, dry, & intact 10/05/21 1230   Dressing Type Transparent 10/05/21 1230   Hub Color/Line Status Pink 10/05/21 1230        Opportunity for questions and clarification was provided.       Patient transported with:   Monitor  Registered Nurse

## 2021-10-06 NOTE — PROCEDURES
ETT    Indication - Cardiac arrest  Diagnosis - Cardiac arrest    The patient was placed in a flat position. The Shakila Aster was used and inserted into the oropharynx at which time there was a Grade 1 view of the vocal cords. A 7.5-St Lucian endotracheal tube was inserted and visualized going through the vocal cords. The stylette was removed. Colorimetric change was visualized on the CO2 meter. Breath sounds were heard in both lung fields equally. The endotracheal tube was placed at 21 cm, measured at the teeth. Central line    Indication - HD instability  Diagnosis - Cardiac arrest    A time-out was completed verifying correct patient, procedure, site, positioning, and special equipment if applicable. The patient was placed in a dependent position appropriate for central line placement based on the vein to be cannulated. The patients right shoulder was prepped and draped in sterile fashion. 1% Lidocaine was used to anesthetize the surrounding skin area. A Quad lumen catheter was introduced into the the internal jugular vein using the Seldinger technique. The catheter was threaded smoothly over the guide wire and appropriate blood return was obtained. Each lumen of the catheter was evacuated of air and flushed with sterile saline. The catheter was then sutured in place to the skin and a sterile dressing applied. Arterial line     Diagnosis - Cardiac arrest  Indication - HD instability    The patients right upper arm was prepped and draped in sterile fashion. 1% Lidocaine was used to anesthetize the area. A 20G Arrow arterial line was introduced into the brachial artery. The catheter was threaded over the guide wire and the needle was removed with appropriate pulsatile blood return. The catheter was then sutured in place to the skin and a sterile dressing applied. Perfusion to the extremity distal to the point of catheter insertion was checked and found to be adequate.

## 2021-10-06 NOTE — PROGRESS NOTES
Spiritual Care Assessment/Progress Note  [facility]      NAME: Mel Fuentes      MRN: [id number]  AGE: 80 y.o. SEX: female  Taoist Affiliation: Protestant   Language: English     10/6/2021     Total Time (in minutes): 30     Spiritual Assessment begun in [facility] CORONARY CARE through conversation with:         []Patient        [x] Family    [] Friend(s)        Reason for Consult: End-of-life support     Spiritual beliefs: (Please include comment if needed)     [x] Identifies with a pricila tradition:         [] Supported by a pricila community:            [] Claims no spiritual orientation:           [] Seeking spiritual identity:                [] Adheres to an individual form of spirituality:           [] Not able to assess:                           Identified resources for coping:      [x] Prayer                               [] Music                  [] Guided Imagery     [x] Family/friends                 [] Pet visits     [] Devotional reading                         [] Unknown     [] Other:                                               Interventions offered during this visit: (See comments for more details)    Patient Interventions: Prayer (actual)     Family/Friend(s):  Affirmation of emotions/emotional suffering, Affirmation of pricila, Catharsis/review of pertinent events in supportive environment, Coping skills reviewed/reinforced, End of life issues discussed, Iconic (affirming the presence of God/Higher Power), Normalization of emotional/spiritual concerns, Prayer (actual)     Plan of Care:     [] Support spiritual and/or cultural needs    [] Support AMD and/or advance care planning process      [] Support grieving process   [] Coordinate Rites and/or Rituals    [] Coordination with community clergy   [] No spiritual needs identified at this time   [] Detailed Plan of Care below (See Comments)  [] Make referral to Music Therapy  [] Make referral to Pet Therapy     [] Make referral to Addiction services  [] Make referral to [facility]  [] Make referral to Spiritual Care Partner  [] No future visits requested        [x] Follow up visits as needed     Pt is near EOL. At staff request visited with family members at bedside. Four of the pt's numerous grand daughters present. Listened to their stories about the pt and offered a prayer of commendation at bedside. Encouraged nurse contact spiritual care for further referral or consult.   Chaplain Eric, MDiv, MS, [facility]

## 2021-10-06 NOTE — PROGRESS NOTES
Code blue called this morning. Patient was resuscitated per ACLS protocol. ICU team present.   I have updated son -Ellie Fairly twice -during the resuscitation-I have explained the events this morning and he said to continue full resuscitation measures and at bed side again when he arrived to the hospital.      Patient transferred to ICU

## 2021-10-07 VITALS
TEMPERATURE: 92.7 F | DIASTOLIC BLOOD PRESSURE: 36 MMHG | WEIGHT: 90.39 LBS | HEIGHT: 60 IN | SYSTOLIC BLOOD PRESSURE: 114 MMHG | BODY MASS INDEX: 17.75 KG/M2

## 2021-10-07 LAB
ABO + RH BLD: NORMAL
BLD PROD TYP BPU: NORMAL
BLOOD GROUP ANTIBODIES SERPL: NORMAL
BPU ID: NORMAL
CROSSMATCH RESULT,%XM: NORMAL
SPECIMEN EXP DATE BLD: NORMAL
STATUS OF UNIT,%ST: NORMAL
UNIT DIVISION, %UDIV: 0

## 2021-10-07 PROCEDURE — 77030029065 HC DRSG HEMO QCLOT ZMED -B

## 2021-10-07 NOTE — PROGRESS NOTES
Responded initially to a code blue. The pt ultimately . Offered support to two of the pt's sons and one grandson. Listened as they spoke of her long life as they processed the grief incumbent to such a loss.    Chaplain Wilks MDiv, MS, [address]

## 2021-10-07 NOTE — DEATH NOTE
Pt Name  Oma Ruggiero   Admit date:  10/5/2021   Date and time of death:  10/06/21 @ 11:29 PM   Room Number  4223/01    Medical Record Number  [id number] @ . [address]   Age  80 y.o. Date of Birth 8/12/1925   PCP Edwardo Gonzalez MD   Attending physician Tay Nunes MD      Code Status  Full Code    Patient seen and examined     Mental status   Unresponsive    Pupils Dilated and Fixed    Respiration Nil    Pulse  Absent     Heart Sounds  Absent    Rhythm  Flat line   Family  Notified by Nursing staff    Chaplan Service  Notified by Nursing staff     Death certificate and discharge summary completion remain  Dr. Marah Wilson MD's responsibility.                                  10/6/2021     [facility]     Critical Care Medicine  [facility]

## 2021-10-07 NOTE — PROGRESS NOTES
1930: Bedside and Verbal shift change report given to Sagrario Hurley, YONAS (oncoming nurse) by Luis Capps RN (offgoing nurse). Report included the following information SBAR, Kardex, ED Summary, Procedure Summary, Intake/Output, MAR, Recent Results, Med Rec Status, Cardiac Rhythm NSR, Alarm Parameters  and Dual Neuro Assessment. Primary Nurse Ash Rojas and Steph Alfaro RN performed a dual skin assessment on this patient Impairment noted- see wound doc flow sheet  Current Bed: COSME Valdivia  Isidro score is 11    Drips: Vaso @ 0.04, Epi @ 8, Levo @ 28, Bicarb @ 100     2000: Resumed pt care. Lifenet paged d/t [address]     2015: 1st unit cryo started     2030: 1st unit cryo complete, 2nd unit cryo started. Orders to increased bicarb gtt concentration, 500cc 5% albumin    2045: 2nd unit cryo complete, VSS.     2100: Femostop removed. No bleeding noted, site covered w/ quick clot & gauze    2200: Pt 2 son's at bedside. MD paged. 2307: BP dropping, pulse widening, asystole - Code Blue called, CPR started   2308: 1 epi   8936: pulse check, asystole, CPR resumed. Bicarb given  2310: 2 amps bicarb given   2311: pulse check, asystole, CPR resumed. 1 epi given   2313: pulse check, vfib. x1 shock @ 200J, CPR resumed   3823: 1 epi   6941: pulse check, asystole, CPR resumed   2317: pulse check, asystole, CPR resumed   2318: 1 amp bicarb   9160: pulse check, asystole, CPR resumed, 1 epi given   9214: pulse check, asystole, CPR resumed   2322: 1 epi   7039: pulse check, asystole, CPR resumed, 1 amp bicarb given   2325: pulse check, asystole, CPR resumed, 1 epi given   2327: pulse check, asystole, CPR resumed, 1 epi given   2329: pulse check, asystole. MD @ bedside,  w/ no cardiac activity. Code stopped, time of death pronounced. Lifenet paged.

## 2021-10-07 NOTE — PROGRESS NOTES
Problem: Pressure Injury - Risk of  Goal: *Prevention of pressure injury  Description: Document Isidro Scale and appropriate interventions in the flowsheet. Outcome: Progressing Towards Goal  Note: Pressure Injury Interventions:  Sensory Interventions: Assess changes in LOC, Assess need for specialty bed, Avoid rigorous massage over bony prominences, Check visual cues for pain, Float heels, Keep linens dry and wrinkle-free, Minimize linen layers, Monitor skin under medical devices, Pressure redistribution bed/mattress (bed type), Turn and reposition approx. every two hours (pillows and wedges if needed)    Moisture Interventions: Absorbent underpads, Apply protective barrier, creams and emollients, Assess need for specialty bed, Check for incontinence Q2 hours and as needed, Maintain skin hydration (lotion/cream), Minimize layers, Moisture barrier    Activity Interventions: Pressure redistribution bed/mattress(bed type), Assess need for specialty bed    Mobility Interventions: Assess need for specialty bed, Float heels, Pressure redistribution bed/mattress (bed type), Turn and reposition approx.  every two hours(pillow and wedges)    Nutrition Interventions: Document food/fluid/supplement intake, Discuss nutritional consult with provider    Friction and Shear Interventions: Apply protective barrier, creams and emollients, Foam dressings/transparent film/skin sealants, Lift sheet, Minimize layers, Transferring/repositioning devices                Problem: Patient Education: Go to Patient Education Activity  Goal: Patient/Family Education  Outcome: Progressing Towards Goal     Problem: Ventilator Management  Goal: *Adequate oxygenation and ventilation  Outcome: Progressing Towards Goal  Goal: *Patient maintains clear airway/free of aspiration  Outcome: Progressing Towards Goal  Goal: *Absence of infection signs and symptoms  Outcome: Progressing Towards Goal  Goal: *Normal spontaneous ventilation  Outcome: Progressing Towards Goal     Problem: Patient Education: Go to Patient Education Activity  Goal: Patient/Family Education  Outcome: Progressing Towards Goal     Problem: Falls - Risk of  Goal: *Absence of Falls  Description: Document Alonzo Hernandez Fall Risk and appropriate interventions in the flowsheet. Outcome: Progressing Towards Goal  Note: Fall Risk Interventions:       Mentation Interventions: Door open when patient unattended, Room close to nurse's station, Update white board, Toileting rounds    Medication Interventions: Evaluate medications/consider consulting pharmacy    Elimination Interventions: Toileting schedule/hourly rounds              Problem: Patient Education: Go to Patient Education Activity  Goal: Patient/Family Education  Outcome: Progressing Towards Goal     Problem: Pain  Goal: *Control of Pain  Outcome: Progressing Towards Goal  Goal: *PALLIATIVE CARE:  Alleviation of Pain  Outcome: Progressing Towards Goal     Problem: Patient Education: Go to Patient Education Activity  Goal: Patient/Family Education  Outcome: Progressing Towards Goal     Problem: Falls - Risk of  Goal: *Absence of Falls  Description: Document Gera Fall Risk and appropriate interventions in the flowsheet. Outcome: Progressing Towards Goal  Note: Fall Risk Interventions:       Mentation Interventions: Door open when patient unattended, Room close to nurse's station, Update white board, Toileting rounds    Medication Interventions: Evaluate medications/consider consulting pharmacy    Elimination Interventions:  Toileting schedule/hourly rounds              Problem: Patient Education: Go to Patient Education Activity  Goal: Patient/Family Education  Outcome: Progressing Towards Goal     Problem: Infection - Risk of, Central Venous Catheter-Associated Bloodstream Infection  Goal: *Absence of infection signs and symptoms  Outcome: Progressing Towards Goal     Problem: Patient Education: Go to Patient Education Activity  Goal: Patient/Family Education  Outcome: Progressing Towards Goal     Problem: Infection - Risk of, Ventilator-Associated Pneumonia  Goal: *Absence of infection signs and symptoms  Outcome: Progressing Towards Goal     Problem: Patient Education: Go to Patient Education Activity  Goal: Patient/Family Education  Outcome: Progressing Towards Goal     Problem: Hypotension  Goal: *Blood pressure within specified parameters  Outcome: Progressing Towards Goal  Goal: *Fluid volume balance  Outcome: Progressing Towards Goal  Goal: *Labs within defined limits  Outcome: Progressing Towards Goal     Problem: Patient Education: Go to Patient Education Activity  Goal: Patient/Family Education  Outcome: Progressing Towards Goal     Problem:  Body Temperature -  Risk of, Imbalanced  Goal: *Absence of heat stress or hyperthermia signs and symptoms  Outcome: Progressing Towards Goal  Goal: *Absence of cold stress or hypothermia signs and symptoms  Outcome: Progressing Towards Goal     Problem: Patient Education: Go to Patient Education Activity  Goal: Patient/Family Education  Outcome: Progressing Towards Goal     Problem: Anemia Care Plan (Adult and Pediatric)  Goal: *Labs within defined limits  Outcome: Progressing Towards Goal  Goal: *Tolerates increased activity  Outcome: Progressing Towards Goal     Problem: Patient Education: Go to Patient Education Activity  Goal: Patient/Family Education  Outcome: Progressing Towards Goal

## 2021-10-07 NOTE — DISCHARGE SUMMARY
[facility]                                                                                         Discharge Summary     Patient: Coty Pittman       MRN: [id number]       YOB: 1925       Age: 80 y.o. Date of admission:  10/5/2021    Date of discharge:  10/6/2021    Primary care provider:  James Velazquez MD     Admitting provider:  Rudi Ramires MD    Discharging provider(s): Sandy Stacy MD - Staff C/ Gillian [address]     Consultations  · ED CONSULT TO Brenda [facility]  · IP CONSULT TO PALLIATIVE CARE - PROVIDER  · IP CONSULT TO HOSPITALIST    Procedures  · Central Venous catheter  · Arterial catheter  · Endotracheal intubation  · Thoracostomy tube     Discharge destination:     Admission diagnosis  · Pancreatitis [K85.90]    Please refer to the admission history and physical for details on the presenting problem. Final discharge diagnoses and brief hospital course  Patient admitted on 10/5/2021 for abdominal pain, diagnosed with pancreatitis. Patient in cardiac arrest on 10/6/2021, developed multisystem organ failure. Patient received central venous catheter, intubation, arterial catheter, thoracostomy tube for pneumothorax. Patient also developed DIC requiring blood products. Patient required multiple vasoactive agents.  Patient has escalating pressor requirements in the evening and had an additional episode of cardiac arrest. Patient passed at [address] [place] [address] on 10/6/2021       Chronic Diagnoses:    Problem List as of 10/6/2021 Date Reviewed: 2014        Codes Class Noted - Resolved    Pancreatitis ICD-10-CM: K85.90  ICD-9-CM: 577.0  10/5/2021 - Present        COPD (chronic obstructive pulmonary disease) ([facility] ICD-10-CM: J44.9  ICD-9-CM: 496  3/31/2020 - Present        Dementia ([facility] ICD-10-CM: F03.90  ICD-9-CM: 294.20  Unknown - Present        ICH (intracerebral hemorrhage) ([facility] ICD-10-CM: I61.9  ICD-9-CM: 815  3/29/2020 - Present Acute respiratory failure ([facility] ICD-10-CM: J96.00  ICD-9-CM: 518.81  12/27/2014 - Present        Acute bronchitis ICD-10-CM: J20.9  ICD-9-CM: 466.0  12/27/2014 - Present        Elevated troponin ICD-10-CM: R77.8  ICD-9-CM: 790.6  12/27/2014 - Present        HTN (hypertension) ICD-10-CM: I10  ICD-9-CM: 401.9  12/27/2014 - Present        UTI (urinary tract infection) ICD-10-CM: N39.0  ICD-9-CM: 599.0  12/27/2014 - Present        Chest pain, unspecified ICD-10-CM: R07.9  ICD-9-CM: 786.50  4/18/2013 - Present              Time spent on discharge related activities today greater than 30 minutes. Signed:      Gregory Velásquez MD   Staff [facility]    10/6/2021   11:51 PM        Cc:  Torres Mercado MD

## 2021-10-09 NOTE — PROGRESS NOTES
Physician Progress Note      Chin Gill  CSN #:                  [id number]  :                       1925  ADMIT DATE:       10/5/2021 9:49 AM  DISCH DATE:        10/6/2021 11:29 PM  RESPONDING  PROVIDER #:        Khushboo Monzon MD          QUERY TEXT:    Pt admitted with Acute pancreatitis. Pt noted to have s/s of Sepsis. If possible, please document in the progress notes and discharge summary if you are evaluating and /or treating any of the following after study: The medical record reflects the following:  Risk Factors:  80 y.o. female who presents with failure to thrive, admitted with acute pancreatitis, HX COPD, Dementia, HTN, right sided nontraumatic ICH, SAH, SDH. Here today for decreased appetite for 5 days. ? Clinical Indicators: lactic acid 5.4 to 22.0, CO2 10, O2 sat 69%, wbc 11.6, temp 88.7, RR 32, , Unresponsiveness, hypotensive b/p trend 81/43, 83/36, 89/36  Treatment:  Cefepime,Albumin, calcium gluconate, Zofran,  Sodium bicarb, Thiamine, IVF bolus 2L, mucomyst, duo-neb, dopamine, Levophed, vasopressin, Morphine, Greg Hugger, GI consult, ICU admit,  Options provided:  -- Sepsis, present on admission  -- Sepsis, not present on admission  -- localized infection only without Sepsis  -- lSIRS only  -- Sepsis was ruled out  -- Other - I will add my own diagnosis  -- Disagree - Not applicable / Not valid  -- Disagree - Clinically unable to determine / Unknown  -- Refer to Clinical Documentation Reviewer    PROVIDER RESPONSE TEXT:    This patient has sepsis which was present on admission. Query created by:  Lori Evans on 10/7/2021 11:26 AM      Electronically signed by:  Khushboo Monzon MD 10/9/2021 9:54 AM

## 2021-10-10 LAB
BACTERIA SPEC CULT: NORMAL
BACTERIA SPEC CULT: NORMAL
SERVICE CMNT-IMP: NORMAL
SERVICE CMNT-IMP: NORMAL

## 2023-08-29 NOTE — PROGRESS NOTES
RUR 15%     JAZMINE- Possible SNF placement - referral sent to [facility] via [address] (will need insurance auth). Vs Home with Home Health Nursing and PT. (no preference for agency) BLS transport will be needed at this time. CRM will follow to update the son Serjio Verma [phone] regarding patient's progress with therapy. CRM spoke with the son Serjio Verma. Therapy is recommending SNF for rehab. The patient ambulates with a cane at base line per the son. The son is open to SNF, but prefers home with [facility]. (no preference for agency) CRM offered SNF choice and sent referral to [facility] via [address] (The son is concerned about placement due to the Virus.) [address] letter placed on the chart. CM offered screening for Medicaid Long-Term Services & Supports. Declined. The plan is for patient to return home with family after rehab. CRM will follow to update the son.      Angelia Ramirez, [address]   [phone] Time-based billing (NON-critical care)